# Patient Record
Sex: FEMALE | Race: WHITE | NOT HISPANIC OR LATINO | Employment: FULL TIME | URBAN - METROPOLITAN AREA
[De-identification: names, ages, dates, MRNs, and addresses within clinical notes are randomized per-mention and may not be internally consistent; named-entity substitution may affect disease eponyms.]

---

## 2017-02-09 ENCOUNTER — OFFICE VISIT (OUTPATIENT)
Dept: INTERNAL MEDICINE | Facility: CLINIC | Age: 52
End: 2017-02-09

## 2017-02-09 ENCOUNTER — APPOINTMENT (OUTPATIENT)
Dept: WOMENS IMAGING | Facility: HOSPITAL | Age: 52
End: 2017-02-09

## 2017-02-09 VITALS
HEART RATE: 84 BPM | OXYGEN SATURATION: 97 % | TEMPERATURE: 97.5 F | DIASTOLIC BLOOD PRESSURE: 80 MMHG | BODY MASS INDEX: 32.07 KG/M2 | WEIGHT: 181 LBS | HEIGHT: 63 IN | SYSTOLIC BLOOD PRESSURE: 122 MMHG

## 2017-02-09 DIAGNOSIS — S69.91XA JAMMED FINGER (INTERPHALANGEAL JOINT), RIGHT, INITIAL ENCOUNTER: ICD-10-CM

## 2017-02-09 DIAGNOSIS — M79.644 PAIN OF FINGER OF RIGHT HAND: Primary | ICD-10-CM

## 2017-02-09 PROCEDURE — 99213 OFFICE O/P EST LOW 20 MIN: CPT | Performed by: NURSE PRACTITIONER

## 2017-02-09 PROCEDURE — 73140 X-RAY EXAM OF FINGER(S): CPT | Performed by: RADIOLOGY

## 2017-02-09 PROCEDURE — 73140 X-RAY EXAM OF FINGER(S): CPT | Performed by: NURSE PRACTITIONER

## 2017-02-09 NOTE — PROGRESS NOTES
Subjective   Felipa Stern is a 51 y.o. female who presents due to right forefinger pain.    HPI Comments: She was playing pool yesterday when her hand slipped and she hit the pool table with her right first finger as she was making a shot. She c/o swelling in the joint since then with minimal pain, denies numbness/tingling.    Hand Pain    The incident occurred 12 to 24 hours ago. Injury mechanism: jammed. The pain is present in the right fingers (right first finger). The quality of the pain is described as aching. The pain does not radiate. The pain is at a severity of 2/10. The pain is mild. The pain has been intermittent since the incident. Pertinent negatives include no chest pain, numbness or tingling. The symptoms are aggravated by palpation. She has tried nothing for the symptoms.        The following portions of the patient's history were reviewed and updated as appropriate: allergies, current medications, past social history and problem list.    Past Medical History   Diagnosis Date   • Back pain    • Breast cyst    • H/O mammogram    • Hyperlipidemia    • Tinnitus          Current Outpatient Prescriptions:   •  Multiple Vitamins-Minerals (MULTIVITAMIN GUMMIES ADULT PO), Take  by mouth., Disp: , Rfl:     No Known Allergies    Review of Systems   Constitutional: Negative for chills, fatigue, fever and unexpected weight change.   HENT: Negative for congestion, ear pain, postnasal drip, sinus pressure, sore throat and trouble swallowing.    Eyes: Negative for visual disturbance.   Respiratory: Negative for cough, chest tightness and wheezing.    Cardiovascular: Negative for chest pain, palpitations and leg swelling.   Gastrointestinal: Negative for abdominal pain, blood in stool, nausea and vomiting.   Endocrine: Negative for cold intolerance and heat intolerance.   Genitourinary: Negative for dysuria, frequency and urgency.   Musculoskeletal: Positive for arthralgias. Negative for joint swelling.   Skin:  "Negative for color change.   Allergic/Immunologic: Negative for immunocompromised state.   Neurological: Negative for tingling, syncope, weakness, numbness and headaches.   Hematological: Does not bruise/bleed easily.       Objective   Vitals:    02/09/17 1420   BP: 122/80   BP Location: Left arm   Patient Position: Sitting   Cuff Size: Adult   Pulse: 84   Temp: 97.5 °F (36.4 °C)   TempSrc: Oral   SpO2: 97%   Weight: 181 lb (82.1 kg)   Height: 62.5\" (158.8 cm)     Physical Exam   Constitutional: She is oriented to person, place, and time. She appears well-developed and well-nourished. No distress.   HENT:   Head: Normocephalic and atraumatic.   Right Ear: External ear normal.   Left Ear: External ear normal.   Eyes: Conjunctivae are normal.   Neck: Normal range of motion.   Cardiovascular: Normal rate, regular rhythm, normal heart sounds and intact distal pulses.  Exam reveals no gallop and no friction rub.    No murmur heard.  Pulmonary/Chest: Effort normal and breath sounds normal. No respiratory distress.   Musculoskeletal:        Right hand: She exhibits tenderness and swelling. She exhibits normal range of motion.   There is swelling of the right middle phalange in the first finger with surrounding erythema and ecchymosis but no open lacerations   Lymphadenopathy:     She has no cervical adenopathy.   Neurological: She is alert and oriented to person, place, and time.   Skin: Skin is warm and dry. No rash noted. No erythema.   Psychiatric: She has a normal mood and affect. Her behavior is normal.   Nursing note and vitals reviewed.      Assessment/Plan   Felipa was seen today for hand pain.    Diagnoses and all orders for this visit:    Pain of finger of right hand  Comments:  no acute abnl on xray-will send for review. Finger splint placed on joint today, she will rest area & start Ibuprofen as needed for pain  Orders:  -     XR Finger 2+ View Right    Jammed finger (interphalangeal joint), right, initial " encounter

## 2017-04-24 ENCOUNTER — OFFICE VISIT (OUTPATIENT)
Dept: INTERNAL MEDICINE | Facility: CLINIC | Age: 52
End: 2017-04-24

## 2017-04-24 VITALS
DIASTOLIC BLOOD PRESSURE: 84 MMHG | WEIGHT: 178.8 LBS | SYSTOLIC BLOOD PRESSURE: 132 MMHG | BODY MASS INDEX: 31.68 KG/M2 | HEIGHT: 63 IN

## 2017-04-24 DIAGNOSIS — E78.49 OTHER HYPERLIPIDEMIA: ICD-10-CM

## 2017-04-24 DIAGNOSIS — M72.2 PLANTAR FASCIITIS, BILATERAL: ICD-10-CM

## 2017-04-24 DIAGNOSIS — Z00.00 ANNUAL PHYSICAL EXAM: Primary | ICD-10-CM

## 2017-04-24 DIAGNOSIS — J30.2 SEASONAL ALLERGIC RHINITIS, UNSPECIFIED ALLERGIC RHINITIS TRIGGER: ICD-10-CM

## 2017-04-24 DIAGNOSIS — M79.604 LEG PAIN, ANTERIOR, RIGHT: ICD-10-CM

## 2017-04-24 DIAGNOSIS — H93.13 TINNITUS, BILATERAL: ICD-10-CM

## 2017-04-24 DIAGNOSIS — Z12.4 CERVICAL CANCER SCREENING: ICD-10-CM

## 2017-04-24 DIAGNOSIS — Z12.11 SCREEN FOR COLON CANCER: ICD-10-CM

## 2017-04-24 DIAGNOSIS — R73.9 ELEVATED BLOOD SUGAR: ICD-10-CM

## 2017-04-24 PROBLEM — J30.9 ALLERGIC RHINITIS: Status: ACTIVE | Noted: 2017-04-24

## 2017-04-24 PROBLEM — J30.9 ALLERGIC RHINITIS: Status: RESOLVED | Noted: 2017-04-24 | Resolved: 2017-04-24

## 2017-04-24 PROBLEM — M79.606 LEG PAIN, ANTERIOR: Status: ACTIVE | Noted: 2017-04-24

## 2017-04-24 PROBLEM — H93.19 TINNITUS: Status: ACTIVE | Noted: 2017-04-24

## 2017-04-24 LAB
25(OH)D3 SERPL-MCNC: 33.6 NG/ML (ref 30–100)
ALBUMIN SERPL-MCNC: 3.87 G/DL (ref 3.4–4.6)
ALBUMIN/GLOB SERPL: 0.9 G/DL
ALP SERPL-CCNC: 112 U/L (ref 46–116)
ALT SERPL W P-5'-P-CCNC: 29 U/L (ref 14–59)
ANION GAP SERPL CALCULATED.3IONS-SCNC: 8 MMOL/L
AST SERPL-CCNC: 22 U/L (ref 7–37)
BACTERIA UR QL AUTO: ABNORMAL /HPF
BASOPHILS # BLD AUTO: 0.02 10*3/MM3 (ref 0–0.2)
BASOPHILS NFR BLD AUTO: 0.3 % (ref 0–2)
BILIRUB SERPL-MCNC: 0.5 MG/DL (ref 0.2–1)
BILIRUB UR QL STRIP: NEGATIVE
BUN BLD-MCNC: 19 MG/DL (ref 6–22)
BUN/CREAT SERPL: 22.9 (ref 7–25)
CALCIUM SPEC-SCNC: 10 MG/DL (ref 8.6–10.5)
CHLORIDE SERPL-SCNC: 103 MMOL/L (ref 95–107)
CHOLEST SERPL-MCNC: 277 MG/DL (ref 0–200)
CLARITY UR: ABNORMAL
CO2 SERPL-SCNC: 29 MMOL/L (ref 23–32)
COLOR UR: YELLOW
CREAT BLD-MCNC: 0.83 MG/DL (ref 0.55–1.02)
DEPRECATED RDW RBC AUTO: 42.7 FL (ref 37–54)
EOSINOPHIL # BLD AUTO: 0.24 10*3/MM3 (ref 0–0.7)
EOSINOPHIL NFR BLD AUTO: 3.4 % (ref 0–5)
ERYTHROCYTE [DISTWIDTH] IN BLOOD BY AUTOMATED COUNT: 13 % (ref 11.5–15)
GFR SERPL CREATININE-BSD FRML MDRD: 73 ML/MIN/1.73
GLOBULIN UR ELPH-MCNC: 4.1 GM/DL
GLUCOSE BLD-MCNC: 109 MG/DL (ref 70–100)
GLUCOSE UR STRIP-MCNC: NEGATIVE MG/DL
HCT VFR BLD AUTO: 45.3 % (ref 34.1–44.9)
HDLC SERPL-MCNC: 68 MG/DL (ref 40–81)
HEMOCCULT STL QL IA: NEGATIVE
HGB BLD-MCNC: 15.2 G/DL (ref 11.2–15.7)
HGB UR QL STRIP.AUTO: ABNORMAL
HYALINE CASTS UR QL AUTO: ABNORMAL /LPF
KETONES UR QL STRIP: NEGATIVE
LDLC SERPL CALC-MCNC: 188 MG/DL (ref 0–100)
LDLC/HDLC SERPL: 2.76 {RATIO}
LEUKOCYTE ESTERASE UR QL STRIP.AUTO: NEGATIVE
LYMPHOCYTES # BLD AUTO: 2.36 10*3/MM3 (ref 0.8–7)
LYMPHOCYTES NFR BLD AUTO: 33.1 % (ref 10–60)
MCH RBC QN AUTO: 30.6 PG (ref 26–34)
MCHC RBC AUTO-ENTMCNC: 33.6 G/DL (ref 31–37)
MCV RBC AUTO: 91.3 FL (ref 80–100)
MONOCYTES # BLD AUTO: 0.53 10*3/MM3 (ref 0–1)
MONOCYTES NFR BLD AUTO: 7.4 % (ref 0–13)
MUCOUS THREADS URNS QL MICRO: ABNORMAL /HPF
NEUTROPHILS # BLD AUTO: 3.98 10*3/MM3 (ref 1–11)
NEUTROPHILS NFR BLD AUTO: 55.8 % (ref 30–85)
NITRITE UR QL STRIP: NEGATIVE
PH UR STRIP.AUTO: 6 [PH] (ref 5–8)
PLATELET # BLD AUTO: 328 10*3/MM3 (ref 150–450)
PMV BLD AUTO: 9.7 FL (ref 6–12)
POTASSIUM BLD-SCNC: 5 MMOL/L (ref 3.3–5.3)
PROT SERPL-MCNC: 8 G/DL (ref 6.3–8.4)
PROT UR QL STRIP: NEGATIVE
RBC # BLD AUTO: 4.96 10*6/MM3 (ref 3.93–5.22)
RBC # UR: ABNORMAL /HPF
REF LAB TEST METHOD: ABNORMAL
SODIUM BLD-SCNC: 140 MMOL/L (ref 136–145)
SP GR UR STRIP: 1.02 (ref 1–1.03)
SQUAMOUS #/AREA URNS HPF: ABNORMAL /HPF
TRIGL SERPL-MCNC: 106 MG/DL (ref 0–150)
TSH SERPL DL<=0.05 MIU/L-ACNC: 1.2 MIU/ML (ref 0.4–4.2)
UROBILINOGEN UR QL STRIP: ABNORMAL
VLDLC SERPL-MCNC: 21.2 MG/DL
WBC NRBC COR # BLD: 7.13 10*3/MM3 (ref 5–10)
WBC UR QL AUTO: ABNORMAL /HPF

## 2017-04-24 PROCEDURE — 80061 LIPID PANEL: CPT | Performed by: INTERNAL MEDICINE

## 2017-04-24 PROCEDURE — 85025 COMPLETE CBC W/AUTO DIFF WBC: CPT | Performed by: INTERNAL MEDICINE

## 2017-04-24 PROCEDURE — 81001 URINALYSIS AUTO W/SCOPE: CPT | Performed by: INTERNAL MEDICINE

## 2017-04-24 PROCEDURE — 99396 PREV VISIT EST AGE 40-64: CPT | Performed by: INTERNAL MEDICINE

## 2017-04-24 PROCEDURE — 80053 COMPREHEN METABOLIC PANEL: CPT | Performed by: INTERNAL MEDICINE

## 2017-04-24 PROCEDURE — 82274 ASSAY TEST FOR BLOOD FECAL: CPT | Performed by: INTERNAL MEDICINE

## 2017-04-24 PROCEDURE — 84443 ASSAY THYROID STIM HORMONE: CPT | Performed by: INTERNAL MEDICINE

## 2017-04-24 NOTE — PROGRESS NOTES
"Subjective   Felipa Stern is a 51 y.o. female here for   Chief Complaint   Patient presents with   • Annual Exam     w/ pap   .    Vitals:    04/24/17 0947   BP: 132/84   BP Location: Left arm   Patient Position: Sitting   Cuff Size: Adult   Weight: 178 lb 12.8 oz (81.1 kg)   Height: 63.25\" (160.7 cm)       Hyperlipidemia   This is a chronic problem. The current episode started more than 1 year ago. The problem is uncontrolled. Recent lipid tests were reviewed and are high. She has no history of chronic renal disease, hypothyroidism or liver disease. Pertinent negatives include no chest pain, myalgias or shortness of breath.        Encounter Diagnoses   Name Primary?   • Annual physical exam Yes   • Other hyperlipidemia    • Elevated blood sugar    • Seasonal allergic rhinitis, unspecified allergic rhinitis trigger    • Tinnitus, bilateral    • Plantar fasciitis, bilateral    • Cervical cancer screening    • Screen for colon cancer    • Leg pain, anterior, right        The following portions of the patient's history were reviewed and updated as appropriate: allergies, current medications, past social history and problem list.    Review of Systems   Constitutional: Negative for appetite change, chills, fatigue, fever and unexpected weight change.   HENT: Positive for congestion, postnasal drip and tinnitus. Negative for ear pain, mouth sores, sinus pressure, sore throat, trouble swallowing and voice change.    Eyes: Negative for pain and visual disturbance.   Respiratory: Negative for cough, choking, shortness of breath and wheezing.    Cardiovascular: Negative for chest pain, palpitations and leg swelling.   Gastrointestinal: Negative for abdominal pain, blood in stool, constipation, diarrhea, nausea and vomiting.   Endocrine: Negative for cold intolerance, heat intolerance, polydipsia and polyuria.   Genitourinary: Negative for difficulty urinating, dysuria, enuresis, flank pain, frequency, hematuria, pelvic pain, " urgency and vaginal bleeding.   Musculoskeletal: Positive for arthralgias (bilateral foot pain - getting better). Negative for back pain, gait problem, joint swelling, myalgias, neck pain and neck stiffness.   Skin: Negative for color change and rash.   Allergic/Immunologic: Positive for environmental allergies. Negative for food allergies and immunocompromised state.   Neurological: Negative for dizziness, tremors, seizures, syncope, speech difficulty, weakness, numbness and headaches.   Hematological: Negative for adenopathy. Does not bruise/bleed easily.   Psychiatric/Behavioral: Negative for agitation, confusion, decreased concentration, dysphoric mood, sleep disturbance and suicidal ideas. The patient is not nervous/anxious.      Pain in right lateral shin (where there is a varicose vein).    Objective   Physical Exam   Constitutional: She appears well-developed and well-nourished.   HENT:   Right Ear: Hearing, tympanic membrane, external ear and ear canal normal.   Left Ear: Hearing, tympanic membrane, external ear and ear canal normal.   Nose: Right sinus exhibits no maxillary sinus tenderness and no frontal sinus tenderness. Left sinus exhibits no maxillary sinus tenderness and no frontal sinus tenderness.   Eyes: Conjunctivae, EOM and lids are normal. Pupils are equal, round, and reactive to light.   Neck: Trachea normal. Neck supple. No JVD present. Carotid bruit is not present. No tracheal deviation present. No thyroid mass and no thyromegaly present.   Cardiovascular: Normal rate, regular rhythm, S1 normal and S2 normal.  Exam reveals no gallop and no friction rub.    No murmur heard.  Pulses:       Carotid pulses are 2+ on the right side, and 2+ on the left side.       Radial pulses are 2+ on the right side, and 2+ on the left side.        Dorsalis pedis pulses are 2+ on the right side, and 2+ on the left side.        Posterior tibial pulses are 2+ on the right side, and 2+ on the left side.    Pulmonary/Chest: Effort normal and breath sounds normal. Chest wall is not dull to percussion. Right breast exhibits no inverted nipple, no mass, no nipple discharge, no skin change and no tenderness. Left breast exhibits no inverted nipple, no mass, no nipple discharge, no skin change and no tenderness.   Abdominal: Soft. Normal aorta and bowel sounds are normal. She exhibits no abdominal bruit. There is no hepatosplenomegaly. There is no tenderness. There is no rebound and no guarding. No hernia.   Genitourinary: Rectum normal, vagina normal and uterus normal. Rectal exam shows guaiac negative stool. No labial fusion. There is no rash, tenderness, lesion or injury on the right labia. There is no rash, tenderness, lesion or injury on the left labia. Cervix exhibits no discharge. Right adnexum displays no mass, no tenderness and no fullness. Left adnexum displays no mass, no tenderness and no fullness.   Musculoskeletal: Normal range of motion. She exhibits no edema.   Lymphadenopathy:     She has no cervical adenopathy.     She has no axillary adenopathy.        Right: No supraclavicular adenopathy present.        Left: No supraclavicular adenopathy present.   Neurological: She is alert. She has normal strength. No cranial nerve deficit or sensory deficit. She displays a negative Romberg sign.   Reflex Scores:       Patellar reflexes are 2+ on the right side and 2+ on the left side.  Skin: Skin is warm and dry.   Nursing note and vitals reviewed.    Varicose vein right lateral shin.    Assessment/Plan   Problem List Items Addressed This Visit        Unprioritized    Hyperlipidemia    Elevated blood sugar    Allergic rhinitis    Tinnitus    Plantar fasciitis, bilateral    Leg pain, anterior    Relevant Orders    Ambulatory Referral to Vascular Surgery      Other Visit Diagnoses     Annual physical exam    -  Primary    Relevant Orders    CBC Auto Differential    Comprehensive Metabolic Panel    Lipid Panel    POC  FECAL OCCULT BLOOD BY IMMUNOASSAY (Completed)    TSH    Urinalysis With / Microscopic If Indicated    Pap IG, HPV-hr    Vitamin D 25 Hydroxy    Cervical cancer screening        Relevant Orders    Pap IG, HPV-hr    Screen for colon cancer        Relevant Orders    POC FECAL OCCULT BLOOD BY IMMUNOASSAY (Completed)           Normal CPE - pap pending.   High chol - lab pending.   Jordin fasciitis - getting better with ice & stretches - call if incr sx.

## 2017-04-25 NOTE — PROGRESS NOTES
High cholesterol/fatsugar - need low fat/sugar diet & more exercise.  Need to start chol med!  Low vit d (borderline) - need to add vit D 1,000-2,000 units/day.  Small amount of blood in urine - need recheck 1-3 mos.

## 2017-04-28 LAB
CHROM ANALY OVERALL INTERP-IMP: NORMAL
CONV .: NORMAL
CONV PERFORMED BY:: NORMAL
DX ICD CODE: NORMAL
HIV 1 & 2 AB SER-IMP: NORMAL
HPV I/H RISK 1 DNA CVX QL PROBE+SIG AMP: NEGATIVE
REF LAB TEST METHOD: NORMAL
STAT OF ADQ CVX/VAG CYTO-IMP: NORMAL

## 2017-08-15 ENCOUNTER — OFFICE VISIT (OUTPATIENT)
Dept: INTERNAL MEDICINE | Facility: CLINIC | Age: 52
End: 2017-08-15

## 2017-08-15 ENCOUNTER — TRANSCRIBE ORDERS (OUTPATIENT)
Dept: ADMINISTRATIVE | Facility: HOSPITAL | Age: 52
End: 2017-08-15

## 2017-08-15 VITALS
BODY MASS INDEX: 32.43 KG/M2 | SYSTOLIC BLOOD PRESSURE: 124 MMHG | TEMPERATURE: 98.4 F | HEIGHT: 63 IN | HEART RATE: 93 BPM | WEIGHT: 183 LBS | OXYGEN SATURATION: 98 % | DIASTOLIC BLOOD PRESSURE: 84 MMHG

## 2017-08-15 DIAGNOSIS — M54.10 RADICULAR SYNDROME OF RIGHT LEG: Primary | ICD-10-CM

## 2017-08-15 DIAGNOSIS — S90.121A CONTUSION OF LESSER TOE OF RIGHT FOOT WITHOUT DAMAGE TO NAIL, INITIAL ENCOUNTER: ICD-10-CM

## 2017-08-15 DIAGNOSIS — M79.604 LEG PAIN, ANTERIOR, RIGHT: ICD-10-CM

## 2017-08-15 DIAGNOSIS — J06.9 ACUTE URI: Primary | ICD-10-CM

## 2017-08-15 PROCEDURE — 99214 OFFICE O/P EST MOD 30 MIN: CPT | Performed by: NURSE PRACTITIONER

## 2017-08-15 RX ORDER — CEFDINIR 300 MG/1
300 CAPSULE ORAL 2 TIMES DAILY
Qty: 14 CAPSULE | Refills: 0 | Status: SHIPPED | OUTPATIENT
Start: 2017-08-15 | End: 2017-08-22

## 2017-08-15 RX ORDER — GUAIFENESIN 600 MG/1
1200 TABLET, EXTENDED RELEASE ORAL 2 TIMES DAILY
Qty: 14 TABLET
Start: 2017-08-15 | End: 2017-08-22

## 2017-08-15 RX ORDER — FLUTICASONE PROPIONATE 50 MCG
2 SPRAY, SUSPENSION (ML) NASAL DAILY
Qty: 1 BOTTLE | Refills: 3
Start: 2017-08-15 | End: 2018-02-23

## 2017-08-15 RX ORDER — LORATADINE 10 MG/1
10 TABLET ORAL DAILY
Qty: 10 TABLET
Start: 2017-08-15 | End: 2017-08-25

## 2017-08-15 NOTE — PROGRESS NOTES
Subjective   Felipa Stern is a 51 y.o. female who presents due to respiratory symptoms.    HPI Comments: She also c/o bruising of her 2nd toe, right foot with no acute injury or trauma. However, sx began after she was helping someone move. She has also started walking more over the past few months.  She saw Dr. Aguilar earlier today due to numbness/tingling in right lower leg and will be referred for a MRI of her lumbar spine to further evaluate.    URI    This is a new problem. The current episode started 1 to 4 weeks ago (sx began sangeetha 2 weeks ago). The problem has been gradually worsening. There has been no fever. Associated symptoms include congestion, headaches, rhinorrhea, sinus pain and a sore throat. Pertinent negatives include no abdominal pain, chest pain, coughing, diarrhea, ear pain, nausea, neck pain, sneezing, vomiting or wheezing.        The following portions of the patient's history were reviewed and updated as appropriate: allergies, current medications, past social history and problem list.    Past Medical History:   Diagnosis Date   • Allergic 1990    Mostly mild, intermittent.   • Allergic rhinitis    • Back pain    • Breast cyst    • Elevated blood sugar    • H/O mammogram    • Hyperlipidemia    • Tinnitus    • Visual impairment 1998?    Ocular pressure runs higer than normal.         Current Outpatient Prescriptions:   •  Multiple Vitamins-Minerals (MULTIVITAMIN GUMMIES ADULT PO), Take 1 tablet by mouth Daily., Disp: , Rfl:   •  cefdinir (OMNICEF) 300 MG capsule, Take 1 capsule by mouth 2 (Two) Times a Day for 7 days., Disp: 14 capsule, Rfl: 0  •  fluticasone (FLONASE) 50 MCG/ACT nasal spray, 2 sprays into each nostril Daily., Disp: 1 bottle, Rfl: 3  •  guaiFENesin (MUCINEX) 600 MG 12 hr tablet, Take 2 tablets by mouth 2 (Two) Times a Day for 7 days., Disp: 14 tablet, Rfl:   •  loratadine (CLARITIN) 10 MG tablet, Take 1 tablet by mouth Daily for 10 days., Disp: 10 tablet, Rfl:     No Known  "Allergies    Review of Systems   Constitutional: Negative for appetite change, chills, fatigue and fever.   HENT: Positive for congestion, rhinorrhea, sinus pressure and sore throat. Negative for ear discharge, ear pain, facial swelling, hearing loss, postnasal drip, sneezing and tinnitus.    Respiratory: Negative for cough, chest tightness, shortness of breath and wheezing.    Cardiovascular: Negative for chest pain, palpitations and leg swelling.   Gastrointestinal: Negative for abdominal pain, diarrhea, nausea and vomiting.   Musculoskeletal: Negative for neck pain and neck stiffness.   Skin: Positive for color change.   Neurological: Positive for numbness and headaches.   Hematological: Negative for adenopathy.       Objective   Vitals:    08/15/17 1434   BP: 124/84   BP Location: Left arm   Patient Position: Sitting   Cuff Size: Adult   Pulse: 93   Temp: 98.4 °F (36.9 °C)   TempSrc: Oral   SpO2: 98%   Weight: 183 lb (83 kg)   Height: 63.25\" (160.7 cm)     Physical Exam   Constitutional: She appears well-developed and well-nourished. She is cooperative. She does not have a sickly appearance. She does not appear ill.   HENT:   Head: Normocephalic.   Right Ear: Hearing and external ear normal. No drainage, swelling or tenderness. Tympanic membrane is bulging. Tympanic membrane is not erythematous. No middle ear effusion. No decreased hearing is noted.   Left Ear: Hearing and external ear normal. No drainage, swelling or tenderness. Tympanic membrane is bulging. Tympanic membrane is not erythematous.  No middle ear effusion. No decreased hearing is noted.   Nose: No mucosal edema, rhinorrhea, sinus tenderness or nasal deformity. Right sinus exhibits frontal sinus tenderness. Right sinus exhibits no maxillary sinus tenderness. Left sinus exhibits frontal sinus tenderness. Left sinus exhibits no maxillary sinus tenderness.   Mouth/Throat: Mucous membranes are normal. Posterior oropharyngeal erythema present.   Eyes: " Conjunctivae and lids are normal.   Neck: Trachea normal.   Cardiovascular: Regular rhythm, normal heart sounds and normal pulses.    No murmur heard.  Pulmonary/Chest: Breath sounds normal. No respiratory distress. She has no decreased breath sounds. She has no wheezes. She has no rhonchi. She has no rales.   Lymphadenopathy:     She has no cervical adenopathy.   Neurological: She is alert.   Skin: Skin is warm, dry and intact.   +bruising of 2nd toe, right foot   Nursing note and vitals reviewed.      Assessment/Plan   Felipa was seen today for sinus problem.    Diagnoses and all orders for this visit:    Acute URI  -     guaiFENesin (MUCINEX) 600 MG 12 hr tablet; Take 2 tablets by mouth 2 (Two) Times a Day for 7 days.  -     loratadine (CLARITIN) 10 MG tablet; Take 1 tablet by mouth Daily for 10 days.  -     fluticasone (FLONASE) 50 MCG/ACT nasal spray; 2 sprays into each nostril Daily.  -     cefdinir (OMNICEF) 300 MG capsule; Take 1 capsule by mouth 2 (Two) Times a Day for 7 days.    Leg pain, anterior, right  Comments:  scheduled for MRI by Dr. Aguilar    Contusion of lesser toe of right foot without damage to nail, initial encounter  Comments:  most likely due to increased walking/irritation from shoe, continue to monitor

## 2017-08-21 ENCOUNTER — APPOINTMENT (OUTPATIENT)
Dept: MRI IMAGING | Facility: HOSPITAL | Age: 52
End: 2017-08-21
Attending: SURGERY

## 2017-08-22 ENCOUNTER — HOSPITAL ENCOUNTER (OUTPATIENT)
Dept: MRI IMAGING | Facility: HOSPITAL | Age: 52
Discharge: HOME | End: 2017-08-22
Attending: SURGERY | Admitting: SURGERY

## 2017-08-22 DIAGNOSIS — M54.10 RADICULAR SYNDROME OF RIGHT LEG: ICD-10-CM

## 2017-08-22 PROCEDURE — 72148 MRI LUMBAR SPINE W/O DYE: CPT

## 2017-10-03 ENCOUNTER — OFFICE VISIT (OUTPATIENT)
Dept: INTERNAL MEDICINE | Facility: CLINIC | Age: 52
End: 2017-10-03

## 2017-10-03 VITALS
SYSTOLIC BLOOD PRESSURE: 132 MMHG | DIASTOLIC BLOOD PRESSURE: 80 MMHG | BODY MASS INDEX: 32.36 KG/M2 | HEIGHT: 63 IN | WEIGHT: 182.6 LBS

## 2017-10-03 DIAGNOSIS — Z82.49 FAMILY HISTORY OF HEART DISEASE: ICD-10-CM

## 2017-10-03 DIAGNOSIS — R31.21 ASYMPTOMATIC MICROSCOPIC HEMATURIA: ICD-10-CM

## 2017-10-03 DIAGNOSIS — R20.2 PARESTHESIA: ICD-10-CM

## 2017-10-03 DIAGNOSIS — R73.9 ELEVATED BLOOD SUGAR: ICD-10-CM

## 2017-10-03 DIAGNOSIS — E78.49 OTHER HYPERLIPIDEMIA: Primary | ICD-10-CM

## 2017-10-03 PROCEDURE — 99213 OFFICE O/P EST LOW 20 MIN: CPT | Performed by: INTERNAL MEDICINE

## 2017-10-03 NOTE — PROGRESS NOTES
"Subjective   Felipa Stern is a 51 y.o. female here for   Chief Complaint   Patient presents with   • Hyperlipidemia     3 month follow-up   .    Vitals:    10/03/17 1604   BP: 132/80   BP Location: Left arm   Patient Position: Sitting   Cuff Size: Adult   Weight: 182 lb 9.6 oz (82.8 kg)   Height: 63.25\" (160.7 cm)       Body mass index is 32.09 kg/(m^2).    Hyperlipidemia   This is a chronic problem. The current episode started more than 1 year ago. The problem is controlled. Recent lipid tests were reviewed and are normal. She has no history of diabetes. Pertinent negatives include no chest pain or shortness of breath.        The following portions of the patient's history were reviewed and updated as appropriate: allergies, current medications, past social history and problem list.    Review of Systems   Constitutional: Negative for chills, fatigue and fever.   Respiratory: Negative for cough, shortness of breath and wheezing.    Cardiovascular: Negative for chest pain, palpitations and leg swelling.   Psychiatric/Behavioral: Negative for dysphoric mood and sleep disturbance. The patient is not nervous/anxious.      MRI of lumbar spine shows mild increase in arthritic changes.    Objective   Physical Exam   Constitutional: She appears well-developed and well-nourished. No distress.   Cardiovascular: Normal rate, regular rhythm and normal heart sounds.    Pulmonary/Chest: No respiratory distress. She has no wheezes. She has no rales. She exhibits no tenderness.   Musculoskeletal: She exhibits no edema.   Psychiatric: She has a normal mood and affect. Her behavior is normal.   Nursing note and vitals reviewed.      Assessment/Plan   Diagnoses and all orders for this visit:    Other hyperlipidemia  Comments:  need diet & exercise - need rechk labs 4-6 wks - may need statin  Orders:  -     Comprehensive Metabolic Panel; Future  -     Lipid Panel; Future    Elevated blood sugar  Comments:  need sugar chk  Orders:  -   "   Hemoglobin A1c; Future    Asymptomatic microscopic hematuria  Comments:  need rechk  Orders:  -     Urinalysis With / Microscopic If Indicated - Urine, Clean Catch; Future    Family history of heart disease  Comments:  CABG age 70    Paresthesia  Comments:  left mid back numbness for 15 yrs - also small spot of lateral right shin numb - no change in either location- will see neurosurgeon if any worsening         She declines referral to neurosurgeon & PT for now - call if any weakness or pain.

## 2017-11-17 ENCOUNTER — LAB (OUTPATIENT)
Dept: INTERNAL MEDICINE | Facility: CLINIC | Age: 52
End: 2017-11-17

## 2017-11-17 DIAGNOSIS — E78.49 OTHER HYPERLIPIDEMIA: ICD-10-CM

## 2017-11-17 DIAGNOSIS — R31.21 ASYMPTOMATIC MICROSCOPIC HEMATURIA: ICD-10-CM

## 2017-11-17 DIAGNOSIS — R73.9 ELEVATED BLOOD SUGAR: ICD-10-CM

## 2017-11-17 LAB
ALBUMIN SERPL-MCNC: 4.2 G/DL (ref 3.5–5.2)
ALBUMIN/GLOB SERPL: 1.3 G/DL
ALP SERPL-CCNC: 128 U/L (ref 39–117)
ALT SERPL-CCNC: 24 U/L (ref 1–33)
AST SERPL-CCNC: 21 U/L (ref 1–32)
BILIRUB SERPL-MCNC: 0.3 MG/DL (ref 0.1–1.2)
BILIRUB UR QL STRIP: NEGATIVE
BUN SERPL-MCNC: 18 MG/DL (ref 6–20)
BUN/CREAT SERPL: 19.4 (ref 7–25)
CALCIUM SERPL-MCNC: 10 MG/DL (ref 8.6–10.5)
CHLORIDE SERPL-SCNC: 102 MMOL/L (ref 98–107)
CHOLEST SERPL-MCNC: 267 MG/DL (ref 0–200)
CLARITY UR: ABNORMAL
CO2 SERPL-SCNC: 27.1 MMOL/L (ref 22–29)
COLOR UR: YELLOW
CREAT SERPL-MCNC: 0.93 MG/DL (ref 0.57–1)
GLOBULIN SER CALC-MCNC: 3.2 GM/DL
GLUCOSE SERPL-MCNC: 98 MG/DL (ref 65–99)
GLUCOSE UR STRIP-MCNC: NEGATIVE MG/DL
HBA1C MFR BLD: 5.7 % (ref 4.8–5.6)
HDLC SERPL-MCNC: 64 MG/DL (ref 40–60)
HGB UR QL STRIP.AUTO: NEGATIVE
KETONES UR QL STRIP: NEGATIVE
LDLC SERPL CALC-MCNC: 184 MG/DL (ref 0–100)
LEUKOCYTE ESTERASE UR QL STRIP.AUTO: NEGATIVE
NITRITE UR QL STRIP: NEGATIVE
PH UR STRIP.AUTO: 5.5 [PH] (ref 5–8)
POTASSIUM SERPL-SCNC: 4.6 MMOL/L (ref 3.5–5.2)
PROT SERPL-MCNC: 7.4 G/DL (ref 6–8.5)
PROT UR QL STRIP: NEGATIVE
SODIUM SERPL-SCNC: 141 MMOL/L (ref 136–145)
SP GR UR STRIP: 1.02 (ref 1–1.03)
TRIGL SERPL-MCNC: 96 MG/DL (ref 0–150)
UROBILINOGEN UR QL STRIP: ABNORMAL
VLDLC SERPL-MCNC: 19.2 MG/DL (ref 5–40)

## 2017-11-17 PROCEDURE — 83036 HEMOGLOBIN GLYCOSYLATED A1C: CPT | Performed by: INTERNAL MEDICINE

## 2017-11-17 PROCEDURE — 81003 URINALYSIS AUTO W/O SCOPE: CPT | Performed by: INTERNAL MEDICINE

## 2017-11-18 NOTE — PROGRESS NOTES
High sugar/cholesterol/fat - need low fat/sugar diet & more exercise.  Need to discuss chol medication.

## 2018-02-23 ENCOUNTER — OFFICE VISIT (OUTPATIENT)
Dept: INTERNAL MEDICINE | Facility: CLINIC | Age: 53
End: 2018-02-23

## 2018-02-23 VITALS
WEIGHT: 184.2 LBS | TEMPERATURE: 98.6 F | SYSTOLIC BLOOD PRESSURE: 150 MMHG | BODY MASS INDEX: 32.64 KG/M2 | DIASTOLIC BLOOD PRESSURE: 98 MMHG | HEIGHT: 63 IN

## 2018-02-23 DIAGNOSIS — J06.9 UPPER RESPIRATORY TRACT INFECTION, UNSPECIFIED TYPE: Primary | ICD-10-CM

## 2018-02-23 DIAGNOSIS — I10 ESSENTIAL HYPERTENSION: ICD-10-CM

## 2018-02-23 PROCEDURE — 99213 OFFICE O/P EST LOW 20 MIN: CPT | Performed by: INTERNAL MEDICINE

## 2018-02-23 RX ORDER — LOSARTAN POTASSIUM 100 MG/1
100 TABLET ORAL DAILY
Qty: 30 TABLET | Refills: 4 | Status: SHIPPED | OUTPATIENT
Start: 2018-02-23 | End: 2018-09-18 | Stop reason: SDUPTHER

## 2018-02-23 NOTE — PROGRESS NOTES
"Subjective   Felipa Stern is a 52 y.o. female here for   Chief Complaint   Patient presents with   • Generalized Body Aches     x 2 days   • URI     x 2 days   .    Vitals:    02/23/18 1436 02/23/18 1500   BP: 140/98 150/98   BP Location: Left arm    Patient Position: Sitting    Cuff Size: Adult    Temp: 98.6 °F (37 °C)    TempSrc: Temporal Artery     Weight: 83.6 kg (184 lb 3.2 oz)    Height: 160.7 cm (63.25\")        Body mass index is 32.37 kg/(m^2).    URI    This is a new problem. The current episode started in the past 7 days. The problem has been unchanged. There has been no fever. Associated symptoms include congestion, coughing, rhinorrhea, sinus pain, sneezing and wheezing. Pertinent negatives include no chest pain, diarrhea, dysuria, ear pain, nausea, sore throat, swollen glands or vomiting.        The following portions of the patient's history were reviewed and updated as appropriate: allergies, current medications, past social history and problem list.    Review of Systems   Constitutional: Positive for fatigue. Negative for chills and fever.   HENT: Positive for congestion, postnasal drip, rhinorrhea, sinus pain, sneezing and voice change. Negative for ear pain, sore throat and trouble swallowing.    Respiratory: Positive for cough and wheezing. Negative for shortness of breath.    Cardiovascular: Negative for chest pain, palpitations and leg swelling.   Gastrointestinal: Negative for diarrhea, nausea and vomiting.   Genitourinary: Negative for dysuria.       Objective   Physical Exam   Constitutional: She appears well-developed and well-nourished. No distress.   HENT:   Head: Normocephalic.   Right Ear: External ear normal. Tympanic membrane is bulging. Tympanic membrane is not erythematous.   Left Ear: External ear normal. Tympanic membrane is bulging. Tympanic membrane is not erythematous.   Nose: Right sinus exhibits no frontal sinus tenderness. Left sinus exhibits no frontal sinus tenderness. "   Mouth/Throat: Oropharynx is clear and moist. No oropharyngeal exudate.   Neck: Normal range of motion. Neck supple.   Cardiovascular: Normal rate, regular rhythm and normal heart sounds.    Pulmonary/Chest: Effort normal and breath sounds normal. No respiratory distress. She has no wheezes. She has no rales. She exhibits no tenderness.   Musculoskeletal: She exhibits no edema.   Lymphadenopathy:     She has no cervical adenopathy.   Psychiatric: She has a normal mood and affect. Her behavior is normal.   Nursing note and vitals reviewed.      Assessment/Plan   Diagnoses and all orders for this visit:    Upper respiratory tract infection, unspecified type  Comments:  need daily antihistamine, bid mucinex, saline nasal tid - call if any worsening    Essential hypertension  Comments:  borderline chronically - higher today - stop otc decongestants - start losartan (may start with 1/2 pill daily) - call if bp over 140/90  Orders:  -     losartan (COZAAR) 100 MG tablet; Take 1 tablet by mouth Daily.

## 2018-04-26 ENCOUNTER — OFFICE VISIT (OUTPATIENT)
Dept: INTERNAL MEDICINE | Facility: CLINIC | Age: 53
End: 2018-04-26

## 2018-04-26 VITALS
WEIGHT: 177 LBS | DIASTOLIC BLOOD PRESSURE: 86 MMHG | BODY MASS INDEX: 30.22 KG/M2 | OXYGEN SATURATION: 96 % | SYSTOLIC BLOOD PRESSURE: 118 MMHG | HEART RATE: 83 BPM | HEIGHT: 64 IN

## 2018-04-26 DIAGNOSIS — J30.2 SEASONAL ALLERGIC RHINITIS, UNSPECIFIED CHRONICITY, UNSPECIFIED TRIGGER: ICD-10-CM

## 2018-04-26 DIAGNOSIS — Z82.49 FAMILY HISTORY OF HEART DISEASE: ICD-10-CM

## 2018-04-26 DIAGNOSIS — I10 ESSENTIAL HYPERTENSION: ICD-10-CM

## 2018-04-26 DIAGNOSIS — Z00.00 ANNUAL PHYSICAL EXAM: Primary | ICD-10-CM

## 2018-04-26 DIAGNOSIS — R73.9 ELEVATED BLOOD SUGAR: ICD-10-CM

## 2018-04-26 DIAGNOSIS — R22.42 MASS OF LEG, LEFT: ICD-10-CM

## 2018-04-26 DIAGNOSIS — Z12.11 SCREENING FOR COLON CANCER: ICD-10-CM

## 2018-04-26 DIAGNOSIS — E78.49 OTHER HYPERLIPIDEMIA: ICD-10-CM

## 2018-04-26 LAB
ALBUMIN SERPL-MCNC: 4.1 G/DL (ref 3.5–5.2)
ALBUMIN/GLOB SERPL: 1 G/DL
ALP SERPL-CCNC: 104 U/L (ref 39–117)
ALT SERPL W P-5'-P-CCNC: 17 U/L (ref 1–33)
ANION GAP SERPL CALCULATED.3IONS-SCNC: 11.6 MMOL/L
AST SERPL-CCNC: 20 U/L (ref 1–32)
BILIRUB SERPL-MCNC: 0.5 MG/DL (ref 0.1–1.2)
BUN BLD-MCNC: 14 MG/DL (ref 6–20)
BUN/CREAT SERPL: 18.4 (ref 7–25)
CALCIUM SPEC-SCNC: 10.4 MG/DL (ref 8.6–10.5)
CHLORIDE SERPL-SCNC: 102 MMOL/L (ref 98–107)
CO2 SERPL-SCNC: 27.4 MMOL/L (ref 22–29)
CREAT BLD-MCNC: 0.76 MG/DL (ref 0.57–1)
CRP SERPL-MCNC: 1.17 MG/DL (ref 0.01–0.5)
GFR SERPL CREATININE-BSD FRML MDRD: 81 ML/MIN/1.73
GLOBULIN UR ELPH-MCNC: 4.1 GM/DL
GLUCOSE BLD-MCNC: 110 MG/DL (ref 65–99)
POTASSIUM BLD-SCNC: 4.4 MMOL/L (ref 3.5–5.2)
PROT SERPL-MCNC: 8.2 G/DL (ref 6–8.5)
SODIUM BLD-SCNC: 141 MMOL/L (ref 136–145)

## 2018-04-26 PROCEDURE — 36415 COLL VENOUS BLD VENIPUNCTURE: CPT | Performed by: INTERNAL MEDICINE

## 2018-04-26 PROCEDURE — 99396 PREV VISIT EST AGE 40-64: CPT | Performed by: INTERNAL MEDICINE

## 2018-04-26 PROCEDURE — 86141 C-REACTIVE PROTEIN HS: CPT | Performed by: INTERNAL MEDICINE

## 2018-04-26 PROCEDURE — 80053 COMPREHEN METABOLIC PANEL: CPT | Performed by: INTERNAL MEDICINE

## 2018-04-26 NOTE — PROGRESS NOTES
"Subjective   Felipa Stern is a 52 y.o. female here for   Chief Complaint   Patient presents with   • Annual Exam   • Hyperlipidemia   • Hypertension   .    Vitals:    04/26/18 0803   BP: 118/86   BP Location: Right arm   Patient Position: Sitting   Cuff Size: Adult   Pulse: 83   SpO2: 96%   Weight: 80.3 kg (177 lb)   Height: 161.9 cm (63.75\")       Body mass index is 30.62 kg/m².    Hypertension   This is a chronic problem. The current episode started more than 1 year ago. The problem has been gradually improving since onset. Pertinent negatives include no chest pain, headaches, neck pain, palpitations or shortness of breath.   Hyperlipidemia   This is a chronic problem. The current episode started more than 1 year ago. The problem is uncontrolled. Recent lipid tests were reviewed and are high. She has no history of diabetes. Pertinent negatives include no chest pain, myalgias or shortness of breath.        The following portions of the patient's history were reviewed and updated as appropriate: allergies, current medications, past social history and problem list.    Review of Systems   Constitutional: Negative for appetite change, chills, fatigue, fever and unexpected weight change.   HENT: Positive for postnasal drip. Negative for congestion, ear pain, mouth sores, sore throat, tinnitus, trouble swallowing and voice change.    Eyes: Negative for pain and visual disturbance.   Respiratory: Negative for cough, choking, shortness of breath and wheezing.    Cardiovascular: Negative for chest pain, palpitations and leg swelling.   Gastrointestinal: Negative for abdominal pain, blood in stool, constipation, diarrhea, nausea and vomiting.   Endocrine: Negative for cold intolerance, heat intolerance, polydipsia and polyuria.   Genitourinary: Negative for difficulty urinating, dysuria, flank pain, frequency, hematuria, urgency and vaginal bleeding.   Musculoskeletal: Negative for arthralgias, back pain, gait problem, " joint swelling, myalgias, neck pain and neck stiffness.   Skin: Negative for color change and rash.   Allergic/Immunologic: Positive for environmental allergies. Negative for food allergies and immunocompromised state.   Neurological: Negative for dizziness, tremors, seizures, syncope, speech difficulty, weakness, numbness and headaches.   Hematological: Negative for adenopathy. Does not bruise/bleed easily.   Psychiatric/Behavioral: Negative for agitation, confusion, decreased concentration, dysphoric mood, sleep disturbance and suicidal ideas. The patient is not nervous/anxious.    mass of left lower thigh for 3 mos - no pain    Objective   Physical Exam   Constitutional: She appears well-developed and well-nourished.   HENT:   Right Ear: Hearing, tympanic membrane, external ear and ear canal normal.   Left Ear: Hearing, tympanic membrane, external ear and ear canal normal.   Nose: Right sinus exhibits no maxillary sinus tenderness and no frontal sinus tenderness. Left sinus exhibits no maxillary sinus tenderness and no frontal sinus tenderness.   Eyes: Conjunctivae, EOM and lids are normal. Pupils are equal, round, and reactive to light.   Neck: Trachea normal. Neck supple. No JVD present. Carotid bruit is not present. No tracheal deviation present. No thyroid mass and no thyromegaly present.   Cardiovascular: Normal rate, regular rhythm, S1 normal and S2 normal.  Exam reveals no gallop and no friction rub.    No murmur heard.  Pulses:       Carotid pulses are 2+ on the right side, and 2+ on the left side.       Radial pulses are 2+ on the right side, and 2+ on the left side.        Dorsalis pedis pulses are 2+ on the right side, and 2+ on the left side.        Posterior tibial pulses are 2+ on the right side, and 2+ on the left side.   Pulmonary/Chest: Effort normal and breath sounds normal. Chest wall is not dull to percussion. Right breast exhibits no inverted nipple, no mass, no nipple discharge, no skin  "change and no tenderness. Left breast exhibits no inverted nipple, no mass, no nipple discharge, no skin change and no tenderness.   Abdominal: Soft. Normal aorta and bowel sounds are normal. She exhibits no abdominal bruit. There is no hepatosplenomegaly. There is no tenderness. There is no rebound and no guarding. No hernia.   Musculoskeletal: Normal range of motion. She exhibits no edema.   Lymphadenopathy:     She has no cervical adenopathy.     She has no axillary adenopathy.        Right: No supraclavicular adenopathy present.        Left: No supraclavicular adenopathy present.   Neurological: She is alert. She has normal strength. No cranial nerve deficit or sensory deficit. She displays a negative Romberg sign.   Reflex Scores:       Patellar reflexes are 2+ on the right side and 2+ on the left side.  Skin: Skin is warm and dry. No erythema.   Nursing note and vitals reviewed.    +4X2\" soft mass of right lateral lower thigh    Assessment/Plan   Diagnoses and all orders for this visit:    Annual physical exam  -     Comprehensive Metabolic Panel; Future  -     Comprehensive Metabolic Panel    Screening for colon cancer  -     Ambulatory Referral to General Surgery    Other hyperlipidemia  Comments:  likely genetic - rechk today - consider chol med  Orders:  -     High Sensitivity CRP; Future  -     Homocysteine; Future  -     Lipoprotein NMR; Future  -     Comprehensive Metabolic Panel; Future  -     High Sensitivity CRP  -     Homocysteine  -     Lipoprotein NMR  -     Comprehensive Metabolic Panel    Essential hypertension  Comments:  bp lower b/c wt loss - ok to decrease losartan to 50mg/d - continue diet/ex - call if bp over 140/90  Orders:  -     High Sensitivity CRP; Future  -     Homocysteine; Future  -     Lipoprotein NMR; Future  -     Comprehensive Metabolic Panel; Future  -     High Sensitivity CRP  -     Homocysteine  -     Lipoprotein NMR  -     Comprehensive Metabolic Panel    Seasonal allergic " rhinitis, unspecified chronicity, unspecified trigger  Comments:  off & on sinus pain - need mucinex bid, daily antihis, if needed-flonase/astepro    Family history of heart disease  -     High Sensitivity CRP; Future  -     Homocysteine; Future  -     Lipoprotein NMR; Future  -     High Sensitivity CRP  -     Homocysteine  -     Lipoprotein NMR    Elevated blood sugar  Comments:  continue low sugar diet & daily exercise    Mass of leg, left  Comments:  soft, movable, non-tender - lipoma? - discuss with surgeon     Refer to surgeon for eval of leg mass & for c-scope.

## 2018-04-27 LAB — HCYS SERPL-SCNC: 7.5 UMOL/L (ref 0–15)

## 2018-04-29 LAB
CHOLEST SERPL-MCNC: 266 MG/DL (ref 100–199)
HDL SERPL-SCNC: 28.9 UMOL/L
HDLC SERPL-MCNC: 53 MG/DL
LDL-P: 2328 NMOL/L
LDLC REAL SIZE PAT SERPL: 21.1 NM
LDLC SERPL CALC-MCNC: 190 MG/DL (ref 0–99)
LP-IR SCORE**: 37
SMALL LDL-P: 884 NMOL/L
TRIGL SERPL-MCNC: 115 MG/DL (ref 0–149)

## 2018-05-08 DIAGNOSIS — E78.49 OTHER HYPERLIPIDEMIA: Primary | ICD-10-CM

## 2018-05-08 DIAGNOSIS — I10 ESSENTIAL HYPERTENSION: ICD-10-CM

## 2018-05-08 DIAGNOSIS — R73.9 ELEVATED BLOOD SUGAR: ICD-10-CM

## 2018-05-08 RX ORDER — PRAVASTATIN SODIUM 10 MG
20 TABLET ORAL DAILY
Qty: 30 TABLET | Refills: 6 | Status: SHIPPED | OUTPATIENT
Start: 2018-05-08 | End: 2018-07-02 | Stop reason: HOSPADM

## 2018-07-02 ENCOUNTER — HOSPITAL ENCOUNTER (OUTPATIENT)
Facility: HOSPITAL | Age: 53
Setting detail: HOSPITAL OUTPATIENT SURGERY
Discharge: HOME | End: 2018-07-02
Attending: SURGERY | Admitting: SURGERY

## 2018-07-02 ENCOUNTER — ANESTHESIA EVENT (OUTPATIENT)
Dept: GASTROENTEROLOGY | Facility: HOSPITAL | Age: 53
End: 2018-07-02

## 2018-07-02 ENCOUNTER — ANESTHESIA (OUTPATIENT)
Dept: GASTROENTEROLOGY | Facility: HOSPITAL | Age: 53
End: 2018-07-02

## 2018-07-02 VITALS
DIASTOLIC BLOOD PRESSURE: 69 MMHG | OXYGEN SATURATION: 98 % | TEMPERATURE: 97.7 F | WEIGHT: 175.71 LBS | RESPIRATION RATE: 16 BRPM | HEART RATE: 65 BPM | BODY MASS INDEX: 30 KG/M2 | SYSTOLIC BLOOD PRESSURE: 119 MMHG | HEIGHT: 64 IN

## 2018-07-02 LAB
B-HCG UR QL: NEGATIVE
INTERNAL NEGATIVE CONTROL: NEGATIVE
INTERNAL POSITIVE CONTROL: POSITIVE
Lab: NORMAL

## 2018-07-02 PROCEDURE — 25010000002 PROPOFOL 10 MG/ML EMULSION: Performed by: ANESTHESIOLOGY

## 2018-07-02 RX ORDER — SODIUM CHLORIDE, SODIUM LACTATE, POTASSIUM CHLORIDE, CALCIUM CHLORIDE 600; 310; 30; 20 MG/100ML; MG/100ML; MG/100ML; MG/100ML
30 INJECTION, SOLUTION INTRAVENOUS CONTINUOUS PRN
Status: DISCONTINUED | OUTPATIENT
Start: 2018-07-02 | End: 2018-07-02 | Stop reason: HOSPADM

## 2018-07-02 RX ORDER — PROPOFOL 10 MG/ML
VIAL (ML) INTRAVENOUS AS NEEDED
Status: DISCONTINUED | OUTPATIENT
Start: 2018-07-02 | End: 2018-07-02 | Stop reason: SURG

## 2018-07-02 RX ORDER — SODIUM CHLORIDE 0.9 % (FLUSH) 0.9 %
1-10 SYRINGE (ML) INJECTION AS NEEDED
Status: DISCONTINUED | OUTPATIENT
Start: 2018-07-02 | End: 2018-07-02 | Stop reason: HOSPADM

## 2018-07-02 RX ORDER — PROPOFOL 10 MG/ML
VIAL (ML) INTRAVENOUS CONTINUOUS PRN
Status: DISCONTINUED | OUTPATIENT
Start: 2018-07-02 | End: 2018-07-02 | Stop reason: SURG

## 2018-07-02 RX ADMIN — PROPOFOL 150 MCG/KG/MIN: 10 INJECTION, EMULSION INTRAVENOUS at 08:03

## 2018-07-02 RX ADMIN — PROPOFOL 100 MG: 10 INJECTION, EMULSION INTRAVENOUS at 08:03

## 2018-07-02 RX ADMIN — SODIUM CHLORIDE, POTASSIUM CHLORIDE, SODIUM LACTATE AND CALCIUM CHLORIDE 30 ML/HR: 600; 310; 30; 20 INJECTION, SOLUTION INTRAVENOUS at 07:19

## 2018-07-02 NOTE — DISCHARGE INSTRUCTIONS
For the next 24 hours patient needs to be with a responsible adult.    For 24 hours DO NOT drive, operate machinery, appliances, drink alcohol, make important decisions or sign legal documents.    Start with a light or bland diet and advance to regular diet as tolerated.    Follow recommendations on procedure report provided by your doctor.    Call Dr Merrill for problems . Problems may include but not limited to: large amounts of bleeding, trouble breathing, repeated vomiting, severe unrelieved pain, fever or chills.

## 2018-07-02 NOTE — ANESTHESIA PREPROCEDURE EVALUATION
Anesthesia Evaluation     Patient summary reviewed and Nursing notes reviewed   NPO Solid Status: > 8 hours  NPO Liquid Status: > 8 hours           Airway   Mallampati: III  TM distance: <3 FB  Neck ROM: full  Possible difficult intubation  Dental - normal exam     Pulmonary - normal exam   Cardiovascular - normal exam    (+) hypertension less than 2 medications,       Neuro/Psych  GI/Hepatic/Renal/Endo    (+) obesity,       Musculoskeletal     Abdominal    Substance History   (+) alcohol use (occasional),      OB/GYN          Other                        Anesthesia Plan    ASA 2     MAC     Anesthetic plan and risks discussed with patient.

## 2018-07-02 NOTE — H&P
Subjective   Felipa Stern is a 52 y.o. female who presents today for a screening colonoscopy.  On questioning, there are no GI complaints or problems.      Past Medical History:   Diagnosis Date   • Allergic 1990    Mostly mild, intermittent.   • Allergic rhinitis    • Back pain    • Breast cyst    • Elevated blood sugar    • Essential hypertension 2/23/2018   • H/O mammogram    • Hyperlipidemia    • Tinnitus    • Visual impairment 1998?    Ocular pressure runs higer than normal.       PHYSICAL EXAM  Pt is in no distress.  Heart regular.  Chest clear.  Abdomen soft.  Rectal deferred to endoscopy.      Assessment/Plan      Plan a screening colonoscopy today.  Risks and benefits were discussed.  Patient is agreeable.  Final recommendations will follow depending on the results.

## 2018-07-02 NOTE — ANESTHESIA POSTPROCEDURE EVALUATION
"Patient: Felipa Stern    Procedure Summary     Date:  07/02/18 Room / Location:   [facility]    Anesthesia Start:  0757 Anesthesia Stop:  0816    Procedure:  COLONOSCOPY INTO CECUM (N/A ) Diagnosis:      Surgeon:  Karel Merrill MD Provider:  Carlyn Stephens MD    Anesthesia Type:  MAC ASA Status:  2          Anesthesia Type: MAC  Last vitals  BP   108/65 (07/02/18 0816)   Temp   36.6 °C (97.8 °F) (07/02/18 0713)   Pulse   72 (07/02/18 0816)   Resp   16 (07/02/18 0816)     SpO2   98 % (07/02/18 0816)     Post Anesthesia Care and Evaluation    Patient location during evaluation: bedside  Patient participation: complete - patient participated  Level of consciousness: awake  Pain management: adequate  Airway patency: patent  Anesthetic complications: No anesthetic complications    Cardiovascular status: acceptable  Respiratory status: acceptable  Hydration status: acceptable    Comments: /65 (BP Location: Left arm, Patient Position: Lying)   Pulse 72   Temp 36.6 °C (97.8 °F) (Oral)   Resp 16   Ht 162.6 cm (64\")   Wt 79.7 kg (175 lb 11.3 oz)   SpO2 98%   BMI 30.16 kg/m²         "

## 2018-09-18 DIAGNOSIS — I10 ESSENTIAL HYPERTENSION: ICD-10-CM

## 2018-09-18 RX ORDER — LOSARTAN POTASSIUM 100 MG/1
TABLET ORAL
Qty: 30 TABLET | Refills: 3 | Status: SHIPPED | OUTPATIENT
Start: 2018-09-18 | End: 2019-02-18 | Stop reason: SDUPTHER

## 2018-10-30 ENCOUNTER — OFFICE VISIT (OUTPATIENT)
Dept: INTERNAL MEDICINE | Facility: CLINIC | Age: 53
End: 2018-10-30

## 2018-10-30 VITALS
RESPIRATION RATE: 16 BRPM | DIASTOLIC BLOOD PRESSURE: 84 MMHG | HEIGHT: 64 IN | OXYGEN SATURATION: 96 % | HEART RATE: 83 BPM | WEIGHT: 183 LBS | BODY MASS INDEX: 31.24 KG/M2 | SYSTOLIC BLOOD PRESSURE: 130 MMHG

## 2018-10-30 DIAGNOSIS — Z78.0 MENOPAUSE: ICD-10-CM

## 2018-10-30 DIAGNOSIS — I10 ESSENTIAL HYPERTENSION: Primary | ICD-10-CM

## 2018-10-30 DIAGNOSIS — Z12.31 ENCOUNTER FOR SCREENING MAMMOGRAM FOR BREAST CANCER: ICD-10-CM

## 2018-10-30 DIAGNOSIS — I10 ESSENTIAL HYPERTENSION: ICD-10-CM

## 2018-10-30 DIAGNOSIS — R73.9 ELEVATED BLOOD SUGAR: ICD-10-CM

## 2018-10-30 DIAGNOSIS — Z23 NEED FOR IMMUNIZATION AGAINST INFLUENZA: ICD-10-CM

## 2018-10-30 DIAGNOSIS — E78.49 OTHER HYPERLIPIDEMIA: ICD-10-CM

## 2018-10-30 LAB
ALBUMIN SERPL-MCNC: 4.2 G/DL (ref 3.5–5.2)
ALBUMIN/GLOB SERPL: 1 G/DL
ALP SERPL-CCNC: 108 U/L (ref 39–117)
ALT SERPL W P-5'-P-CCNC: 22 U/L (ref 1–33)
ANION GAP SERPL CALCULATED.3IONS-SCNC: 11.1 MMOL/L
AST SERPL-CCNC: 19 U/L (ref 1–32)
BILIRUB SERPL-MCNC: 0.4 MG/DL (ref 0.1–1.2)
BUN BLD-MCNC: 17 MG/DL (ref 6–20)
BUN/CREAT SERPL: 19.8 (ref 7–25)
CALCIUM SPEC-SCNC: 10.4 MG/DL (ref 8.6–10.5)
CHLORIDE SERPL-SCNC: 101 MMOL/L (ref 98–107)
CHOLEST SERPL-MCNC: 253 MG/DL (ref 0–200)
CO2 SERPL-SCNC: 26.9 MMOL/L (ref 22–29)
CREAT BLD-MCNC: 0.86 MG/DL (ref 0.57–1)
GFR SERPL CREATININE-BSD FRML MDRD: 70 ML/MIN/1.73
GLOBULIN UR ELPH-MCNC: 4.2 GM/DL
GLUCOSE BLD-MCNC: 116 MG/DL (ref 65–99)
HBA1C MFR BLD: 5.82 % (ref 4.8–5.6)
HDLC SERPL-MCNC: 59 MG/DL (ref 40–60)
LDLC SERPL CALC-MCNC: 167 MG/DL (ref 0–100)
LDLC/HDLC SERPL: 2.83 {RATIO}
POTASSIUM BLD-SCNC: 4.4 MMOL/L (ref 3.5–5.2)
PROT SERPL-MCNC: 8.4 G/DL (ref 6–8.5)
SODIUM BLD-SCNC: 139 MMOL/L (ref 136–145)
TRIGL SERPL-MCNC: 134 MG/DL (ref 0–150)
VLDLC SERPL-MCNC: 26.8 MG/DL (ref 5–40)

## 2018-10-30 PROCEDURE — 99213 OFFICE O/P EST LOW 20 MIN: CPT | Performed by: INTERNAL MEDICINE

## 2018-10-30 PROCEDURE — 36415 COLL VENOUS BLD VENIPUNCTURE: CPT | Performed by: INTERNAL MEDICINE

## 2018-10-30 PROCEDURE — 80053 COMPREHEN METABOLIC PANEL: CPT | Performed by: INTERNAL MEDICINE

## 2018-10-30 PROCEDURE — 90674 CCIIV4 VAC NO PRSV 0.5 ML IM: CPT | Performed by: INTERNAL MEDICINE

## 2018-10-30 PROCEDURE — 83036 HEMOGLOBIN GLYCOSYLATED A1C: CPT | Performed by: INTERNAL MEDICINE

## 2018-10-30 PROCEDURE — 80061 LIPID PANEL: CPT | Performed by: INTERNAL MEDICINE

## 2018-10-30 PROCEDURE — 90471 IMMUNIZATION ADMIN: CPT | Performed by: INTERNAL MEDICINE

## 2018-10-30 NOTE — PROGRESS NOTES
"Subjective   Felipa Stern is a 52 y.o. female here for   Chief Complaint   Patient presents with   • Hyperlipidemia     6 month follow-up   • Hypertension   .    Vitals:    10/30/18 0823 10/30/18 0906   BP: 130/82 130/84   BP Location: Left arm    Patient Position: Sitting    Cuff Size: Adult    Pulse: 83    Resp: 16    SpO2: 96%    Weight: 83 kg (183 lb)    Height: 162.6 cm (64\")        Body mass index is 31.41 kg/m².    Hyperlipidemia   This is a chronic problem. The current episode started more than 1 year ago. The problem is controlled. Recent lipid tests were reviewed and are normal. She has no history of diabetes. Pertinent negatives include no chest pain or shortness of breath.   Hypertension   This is a chronic problem. The current episode started more than 1 year ago. The problem is unchanged. The problem is controlled. Pertinent negatives include no chest pain, palpitations or shortness of breath.        The following portions of the patient's history were reviewed and updated as appropriate: allergies, current medications, past social history and problem list.    Review of Systems   Constitutional: Negative for chills, fatigue and fever.   Respiratory: Negative for cough, shortness of breath and wheezing.    Cardiovascular: Negative for chest pain, palpitations and leg swelling.   Psychiatric/Behavioral: Negative for dysphoric mood and sleep disturbance. The patient is not nervous/anxious.        Objective   Physical Exam   Constitutional: She appears well-developed and well-nourished. No distress.   Cardiovascular: Normal rate, regular rhythm and normal heart sounds.    Pulmonary/Chest: No respiratory distress. She has no wheezes. She has no rales. She exhibits no tenderness.   Musculoskeletal: She exhibits no edema.   Psychiatric: She has a normal mood and affect. Her behavior is normal.   Nursing note and vitals reviewed.      Assessment/Plan   Diagnoses and all orders for this visit:    Essential " hypertension  Comments:  controlled with losartan 50mg daily - call if bp over 130/80  Orders:  -     Comprehensive Metabolic Panel  -     Lipid Panel    Encounter for screening mammogram for breast cancer  -     Mammo Screening Bilateral With CAD; Future    Other hyperlipidemia  Comments:  need diet/ex - continue red yeast rice (taking 1/2 top dose)  Orders:  -     Comprehensive Metabolic Panel  -     Lipid Panel    Elevated blood sugar  Comments:  need low sugar diet  Orders:  -     Hemoglobin A1c    Menopause  -     DEXA Bone Density Axial; Future    Other hyperlipidemia  -     Comprehensive Metabolic Panel  -     Lipid Panel    Essential hypertension  -     Comprehensive Metabolic Panel  -     Lipid Panel    Elevated blood sugar  -     Hemoglobin A1c    Need for immunization against influenza  -     Flucelvax Quad=>4Years ([id number])

## 2018-12-07 ENCOUNTER — APPOINTMENT (OUTPATIENT)
Dept: WOMENS IMAGING | Facility: HOSPITAL | Age: 53
End: 2018-12-07

## 2018-12-07 ENCOUNTER — CLINICAL SUPPORT (OUTPATIENT)
Dept: INTERNAL MEDICINE | Facility: CLINIC | Age: 53
End: 2018-12-07

## 2018-12-07 ENCOUNTER — OFFICE VISIT (OUTPATIENT)
Dept: INTERNAL MEDICINE | Facility: CLINIC | Age: 53
End: 2018-12-07

## 2018-12-07 DIAGNOSIS — Z78.0 MENOPAUSE: ICD-10-CM

## 2018-12-07 DIAGNOSIS — Z12.31 ENCOUNTER FOR SCREENING MAMMOGRAM FOR BREAST CANCER: ICD-10-CM

## 2018-12-07 PROCEDURE — 77067 SCR MAMMO BI INCL CAD: CPT | Performed by: INTERNAL MEDICINE

## 2018-12-07 PROCEDURE — 77067 SCR MAMMO BI INCL CAD: CPT | Performed by: RADIOLOGY

## 2018-12-07 PROCEDURE — 77080 DXA BONE DENSITY AXIAL: CPT | Performed by: INTERNAL MEDICINE

## 2019-02-18 DIAGNOSIS — I10 ESSENTIAL HYPERTENSION: ICD-10-CM

## 2019-02-18 RX ORDER — LOSARTAN POTASSIUM 100 MG/1
TABLET ORAL
Qty: 30 TABLET | Refills: 2 | Status: SHIPPED | OUTPATIENT
Start: 2019-02-18 | End: 2019-06-03 | Stop reason: SDUPTHER

## 2019-05-07 ENCOUNTER — OFFICE VISIT (OUTPATIENT)
Dept: INTERNAL MEDICINE | Facility: CLINIC | Age: 54
End: 2019-05-07

## 2019-05-07 VITALS
HEIGHT: 64 IN | SYSTOLIC BLOOD PRESSURE: 128 MMHG | DIASTOLIC BLOOD PRESSURE: 88 MMHG | WEIGHT: 188.2 LBS | BODY MASS INDEX: 32.13 KG/M2

## 2019-05-07 DIAGNOSIS — E78.49 OTHER HYPERLIPIDEMIA: ICD-10-CM

## 2019-05-07 DIAGNOSIS — R73.09 ELEVATED GLUCOSE: ICD-10-CM

## 2019-05-07 DIAGNOSIS — M25.561 CHRONIC PAIN OF RIGHT KNEE: ICD-10-CM

## 2019-05-07 DIAGNOSIS — G89.29 CHRONIC PAIN OF RIGHT KNEE: ICD-10-CM

## 2019-05-07 DIAGNOSIS — I10 ESSENTIAL HYPERTENSION: Primary | ICD-10-CM

## 2019-05-07 LAB
BACTERIA UR QL AUTO: ABNORMAL /HPF
BASOPHILS # BLD AUTO: 0.03 10*3/MM3 (ref 0–0.2)
BASOPHILS NFR BLD AUTO: 0.4 % (ref 0–1.5)
BILIRUB UR QL STRIP: NEGATIVE
CLARITY UR: CLEAR
COLOR UR: YELLOW
DEPRECATED RDW RBC AUTO: 43.3 FL (ref 37–54)
EOSINOPHIL # BLD AUTO: 0.22 10*3/MM3 (ref 0–0.4)
EOSINOPHIL NFR BLD AUTO: 3.3 % (ref 0.3–6.2)
ERYTHROCYTE [DISTWIDTH] IN BLOOD BY AUTOMATED COUNT: 13.5 % (ref 12.3–15.4)
GLUCOSE UR STRIP-MCNC: NEGATIVE MG/DL
HCT VFR BLD AUTO: 45 % (ref 34–46.6)
HGB BLD-MCNC: 15.2 G/DL (ref 12–15.9)
HGB UR QL STRIP.AUTO: NEGATIVE
HYALINE CASTS UR QL AUTO: ABNORMAL /LPF
KETONES UR QL STRIP: NEGATIVE
LEUKOCYTE ESTERASE UR QL STRIP.AUTO: ABNORMAL
LYMPHOCYTES # BLD AUTO: 2.59 10*3/MM3 (ref 0.7–3.1)
LYMPHOCYTES NFR BLD AUTO: 38.5 % (ref 19.6–45.3)
MCH RBC QN AUTO: 30.5 PG (ref 26.6–33)
MCHC RBC AUTO-ENTMCNC: 33.8 G/DL (ref 31.5–35.7)
MCV RBC AUTO: 90.2 FL (ref 79–97)
MONOCYTES # BLD AUTO: 0.46 10*3/MM3 (ref 0.1–0.9)
MONOCYTES NFR BLD AUTO: 6.8 % (ref 5–12)
MUCOUS THREADS URNS QL MICRO: ABNORMAL /HPF
NEUTROPHILS # BLD AUTO: 3.42 10*3/MM3 (ref 1.7–7)
NEUTROPHILS NFR BLD AUTO: 51 % (ref 42.7–76)
NITRITE UR QL STRIP: NEGATIVE
PH UR STRIP.AUTO: 7 [PH] (ref 5–8)
PLATELET # BLD AUTO: 355 10*3/MM3 (ref 140–450)
PMV BLD AUTO: 9.3 FL (ref 6–12)
PROT UR QL STRIP: NEGATIVE
RBC # BLD AUTO: 4.99 10*6/MM3 (ref 3.77–5.28)
RBC # UR: ABNORMAL /HPF
REF LAB TEST METHOD: ABNORMAL
SP GR UR STRIP: 1.02 (ref 1–1.03)
SQUAMOUS #/AREA URNS HPF: ABNORMAL /HPF
UROBILINOGEN UR QL STRIP: ABNORMAL
WBC NRBC COR # BLD: 6.72 10*3/MM3 (ref 3.4–10.8)
WBC UR QL AUTO: ABNORMAL /HPF

## 2019-05-07 PROCEDURE — 85025 COMPLETE CBC W/AUTO DIFF WBC: CPT | Performed by: INTERNAL MEDICINE

## 2019-05-07 PROCEDURE — 99214 OFFICE O/P EST MOD 30 MIN: CPT | Performed by: INTERNAL MEDICINE

## 2019-05-07 PROCEDURE — 81001 URINALYSIS AUTO W/SCOPE: CPT | Performed by: INTERNAL MEDICINE

## 2019-05-07 NOTE — PROGRESS NOTES
"Subjective   Felipa Stern is a 53 y.o. female here for   Chief Complaint   Patient presents with   • Hyperlipidemia     6 month follow-up   • Hypertension   • Knee Pain     x 4 weeks   .    Vitals:    05/07/19 0909   BP: 128/88   BP Location: Left arm   Patient Position: Sitting   Cuff Size: Adult   Weight: 85.4 kg (188 lb 3.2 oz)   Height: 162.6 cm (64\")       Body mass index is 32.3 kg/m².    Hyperlipidemia   This is a chronic problem. The current episode started more than 1 year ago. The problem is controlled. Recent lipid tests were reviewed and are normal. She has no history of diabetes. Pertinent negatives include no chest pain or shortness of breath.   Hypertension   This is a chronic problem. The current episode started more than 1 year ago. The problem is unchanged. The problem is controlled. Pertinent negatives include no chest pain, palpitations or shortness of breath.   Knee Pain    The incident occurred more than 1 week ago. The incident occurred at home. The injury mechanism was a twisting injury. The pain is present in the right knee. The quality of the pain is described as shooting and stabbing. The pain is moderate. The pain has been fluctuating since onset.        The following portions of the patient's history were reviewed and updated as appropriate: allergies, current medications, past social history and problem list.    Review of Systems   Constitutional: Negative for chills, fatigue and fever.   Respiratory: Negative for cough, shortness of breath and wheezing.    Cardiovascular: Negative for chest pain, palpitations and leg swelling.   Musculoskeletal: Positive for arthralgias (right knee pain).       Objective   Physical Exam   Constitutional: She appears well-developed and well-nourished. No distress.   Cardiovascular: Normal rate, regular rhythm and normal heart sounds.   Pulmonary/Chest: No respiratory distress. She has no wheezes. She has no rales. She exhibits no tenderness. "   Musculoskeletal: Normal range of motion. She exhibits no edema, tenderness or deformity.   Neurological: She is alert. No cranial nerve deficit. She exhibits normal muscle tone.   Skin: Skin is warm and dry.   Psychiatric: She has a normal mood and affect. Her behavior is normal.   Nursing note and vitals reviewed.      Assessment/Plan   Diagnoses and all orders for this visit:    Essential hypertension  Comments:  controlled - call if bp over 140/90  Orders:  -     Comprehensive Metabolic Panel; Future  -     Lipid Panel; Future  -     CBC Auto Differential; Future  -     Urinalysis With Microscopic If Indicated (No Culture) - Urine, Clean Catch; Future    Other hyperlipidemia  Comments:  need diet/ex  Orders:  -     Comprehensive Metabolic Panel; Future  -     Lipid Panel; Future    Chronic pain of right knee  Comments:  mild & improving - call for referral to ortho if any worsening    Elevated glucose  Comments:  need low sugar/carbs in diet & daily exercise  Orders:  -     Hemoglobin A1c; Future

## 2019-05-08 LAB
ALBUMIN SERPL-MCNC: 4.4 G/DL (ref 3.5–5.2)
ALBUMIN/GLOB SERPL: 1.2 G/DL
ALP SERPL-CCNC: 118 U/L (ref 39–117)
ALT SERPL-CCNC: 29 U/L (ref 1–33)
AST SERPL-CCNC: 21 U/L (ref 1–32)
BILIRUB SERPL-MCNC: 0.5 MG/DL (ref 0.2–1.2)
BUN SERPL-MCNC: 16 MG/DL (ref 6–20)
BUN/CREAT SERPL: 22.2 (ref 7–25)
CALCIUM SERPL-MCNC: 10.6 MG/DL (ref 8.6–10.5)
CHLORIDE SERPL-SCNC: 99 MMOL/L (ref 98–107)
CHOLEST SERPL-MCNC: 263 MG/DL (ref 0–200)
CO2 SERPL-SCNC: 25.6 MMOL/L (ref 22–29)
CREAT SERPL-MCNC: 0.72 MG/DL (ref 0.57–1)
GLOBULIN SER CALC-MCNC: 3.8 GM/DL
GLUCOSE SERPL-MCNC: 110 MG/DL (ref 65–99)
HBA1C MFR BLD: 6 % (ref 4.8–5.6)
HDLC SERPL-MCNC: 57 MG/DL (ref 40–60)
LDLC SERPL CALC-MCNC: 173 MG/DL (ref 0–100)
POTASSIUM SERPL-SCNC: 4.6 MMOL/L (ref 3.5–5.2)
PROT SERPL-MCNC: 8.2 G/DL (ref 6–8.5)
SODIUM SERPL-SCNC: 141 MMOL/L (ref 136–145)
TRIGL SERPL-MCNC: 164 MG/DL (ref 0–150)
VLDLC SERPL-MCNC: 32.8 MG/DL

## 2019-05-08 NOTE — PROGRESS NOTES
High sugar/cholesterol/fat - need low fat/sugar diet & more exercise. Slightly high calcium - avoid calcium supplements & recheck several mos.

## 2019-06-03 DIAGNOSIS — I10 ESSENTIAL HYPERTENSION: ICD-10-CM

## 2019-06-03 RX ORDER — LOSARTAN POTASSIUM 100 MG/1
TABLET ORAL
Qty: 30 TABLET | Refills: 1 | Status: SHIPPED | OUTPATIENT
Start: 2019-06-03 | End: 2019-08-15 | Stop reason: SDUPTHER

## 2019-08-15 DIAGNOSIS — I10 ESSENTIAL HYPERTENSION: ICD-10-CM

## 2019-08-15 RX ORDER — LOSARTAN POTASSIUM 100 MG/1
TABLET ORAL
Qty: 30 TABLET | Refills: 0 | Status: SHIPPED | OUTPATIENT
Start: 2019-08-15 | End: 2019-09-14 | Stop reason: SDUPTHER

## 2019-09-14 DIAGNOSIS — I10 ESSENTIAL HYPERTENSION: ICD-10-CM

## 2019-09-16 RX ORDER — LOSARTAN POTASSIUM 100 MG/1
TABLET ORAL
Qty: 30 TABLET | Refills: 5 | Status: SHIPPED | OUTPATIENT
Start: 2019-09-16 | End: 2019-11-06 | Stop reason: SDUPTHER

## 2019-09-21 DIAGNOSIS — I10 ESSENTIAL HYPERTENSION: ICD-10-CM

## 2019-09-23 RX ORDER — LOSARTAN POTASSIUM 100 MG/1
TABLET ORAL
Qty: 30 TABLET | Refills: 5 | Status: SHIPPED | OUTPATIENT
Start: 2019-09-23 | End: 2020-05-04

## 2019-11-06 ENCOUNTER — OFFICE VISIT (OUTPATIENT)
Dept: INTERNAL MEDICINE | Facility: CLINIC | Age: 54
End: 2019-11-06

## 2019-11-06 VITALS
SYSTOLIC BLOOD PRESSURE: 134 MMHG | WEIGHT: 183 LBS | HEART RATE: 81 BPM | OXYGEN SATURATION: 97 % | BODY MASS INDEX: 31.41 KG/M2 | DIASTOLIC BLOOD PRESSURE: 86 MMHG

## 2019-11-06 DIAGNOSIS — M25.511 ACUTE PAIN OF RIGHT SHOULDER: Primary | ICD-10-CM

## 2019-11-06 PROCEDURE — 99213 OFFICE O/P EST LOW 20 MIN: CPT | Performed by: NURSE PRACTITIONER

## 2019-11-06 RX ORDER — METHYLPREDNISOLONE 4 MG/1
TABLET ORAL
Qty: 21 TABLET | Refills: 0 | Status: SHIPPED | OUTPATIENT
Start: 2019-11-06 | End: 2019-11-13

## 2019-11-06 NOTE — PROGRESS NOTES
Subjective     Felipa Stern is a 53 y.o. female.         She presents for a R shoulder injury initially suffered a week and half ago which then got better with ROM, heat, and ibuprofen but then she reinjured it yesterday. She was pushing a glass door open and turned her body awkardly both times causing pain to her R shoulder. Her pain is a dull ache and is mostly presents near her R trapezius area. She reports numbness and tingling down to her R hand. Denies weakness.       Shoulder Injury    The incident occurred at work. The incident occurred more than 1 week ago. The quality of the pain is described as aching. The pain does not radiate. The pain is at a severity of 2/10. Associated symptoms include numbness and tingling. Pertinent negatives include no chest pain or muscle weakness. She has tried elevation, ice, heat and NSAIDs for the symptoms. The treatment provided moderate relief.        The following portions of the patient's history were reviewed and updated as appropriate: allergies, current medications, past social history and problem list.    Review of Systems   Constitutional: Negative for fever.   Cardiovascular: Negative for chest pain.   Musculoskeletal: Negative for joint swelling.   Skin: Negative for color change and wound.   Neurological: Positive for tingling and numbness. Negative for weakness.       Objective     /86 (BP Location: Left arm, Patient Position: Sitting, Cuff Size: Adult)   Pulse 81   Wt 83 kg (183 lb)   SpO2 97%   BMI 31.41 kg/m²     Physical Exam   Constitutional: She appears well-developed and well-nourished.   HENT:   Head: Normocephalic and atraumatic.   Pulmonary/Chest: Effort normal. No respiratory distress.   Musculoskeletal: Normal range of motion.        Right shoulder: She exhibits pain (mild pain with ROM). She exhibits normal range of motion, no tenderness, no bony tenderness, no swelling, no effusion and normal strength.   Neurological: She is alert. She  has normal strength. No sensory deficit.   Skin: Skin is warm and dry.   Psychiatric: She has a normal mood and affect. Her behavior is normal. Judgment and thought content normal.   Vitals reviewed.      Assessment/Plan     Felipa was seen today for shoulder injury.    Diagnoses and all orders for this visit:    Acute pain of right shoulder  -     methylPREDNISolone (MEDROL, TAN,) 4 MG tablet; Take as directed on package instructions.  -     Ambulatory Referral to Physical Therapy Evaluate and treat    She will continue heat, ice, ROM exercises, and NSAIDs.    Will refer to PT since she reinjured Her R shoulder, she will need strengthening exercises.    Oral steroids given to help with the numbness and tingling down her R arm that goes into her R hand.    Return for worsening of sx.

## 2019-11-07 ENCOUNTER — TELEPHONE (OUTPATIENT)
Dept: INTERNAL MEDICINE | Facility: CLINIC | Age: 54
End: 2019-11-07
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
## 2019-11-08 NOTE — TELEPHONE ENCOUNTER
Message left for Carline downs/ Rommel Guardado informing her that I've spoke to our occupational health department and Ms. Stern would have to come into our office and speak to Dayana or Salma in regards to this request for medical records and fill out appropriate paperwork with them since this is a workers comp claim.

## 2019-11-13 ENCOUNTER — OFFICE VISIT (OUTPATIENT)
Dept: INTERNAL MEDICINE | Facility: CLINIC | Age: 54
End: 2019-11-13

## 2019-11-13 VITALS
HEIGHT: 64 IN | BODY MASS INDEX: 31.24 KG/M2 | WEIGHT: 183 LBS | SYSTOLIC BLOOD PRESSURE: 132 MMHG | DIASTOLIC BLOOD PRESSURE: 88 MMHG

## 2019-11-13 DIAGNOSIS — G89.29 CHRONIC PAIN OF LEFT KNEE: ICD-10-CM

## 2019-11-13 DIAGNOSIS — Z00.00 ANNUAL PHYSICAL EXAM: Primary | ICD-10-CM

## 2019-11-13 DIAGNOSIS — R20.2 RIGHT HAND PARESTHESIA: ICD-10-CM

## 2019-11-13 DIAGNOSIS — J30.2 SEASONAL ALLERGIC RHINITIS, UNSPECIFIED TRIGGER: ICD-10-CM

## 2019-11-13 DIAGNOSIS — M25.562 CHRONIC PAIN OF LEFT KNEE: ICD-10-CM

## 2019-11-13 DIAGNOSIS — E78.49 OTHER HYPERLIPIDEMIA: ICD-10-CM

## 2019-11-13 DIAGNOSIS — R73.9 ELEVATED BLOOD SUGAR: ICD-10-CM

## 2019-11-13 DIAGNOSIS — I10 ESSENTIAL HYPERTENSION: ICD-10-CM

## 2019-11-13 DIAGNOSIS — Z12.31 ENCOUNTER FOR SCREENING MAMMOGRAM FOR BREAST CANCER: ICD-10-CM

## 2019-11-13 DIAGNOSIS — Z12.11 COLON CANCER SCREENING: ICD-10-CM

## 2019-11-13 DIAGNOSIS — R31.21 ASYMPTOMATIC MICROSCOPIC HEMATURIA: ICD-10-CM

## 2019-11-13 DIAGNOSIS — Z12.4 CERVICAL CANCER SCREENING: ICD-10-CM

## 2019-11-13 DIAGNOSIS — Z82.49 FAMILY HISTORY OF HEART DISEASE: ICD-10-CM

## 2019-11-13 LAB
ALBUMIN SERPL-MCNC: 4 G/DL (ref 3.5–5.2)
ALBUMIN/GLOB SERPL: 1.4 G/DL
ALP SERPL-CCNC: 99 U/L (ref 39–117)
ALT SERPL-CCNC: 18 U/L (ref 1–33)
AST SERPL-CCNC: 14 U/L (ref 1–32)
BILIRUB SERPL-MCNC: 0.5 MG/DL (ref 0.2–1.2)
BUN SERPL-MCNC: 22 MG/DL (ref 6–20)
BUN/CREAT SERPL: 29.3 (ref 7–25)
CALCIUM SERPL-MCNC: 9 MG/DL (ref 8.6–10.5)
CHLORIDE SERPL-SCNC: 100 MMOL/L (ref 98–107)
CHOLEST SERPL-MCNC: 234 MG/DL (ref 0–200)
CO2 SERPL-SCNC: 27.2 MMOL/L (ref 22–29)
CREAT SERPL-MCNC: 0.75 MG/DL (ref 0.57–1)
GLOBULIN SER CALC-MCNC: 2.9 GM/DL
GLUCOSE SERPL-MCNC: 97 MG/DL (ref 65–99)
HBA1C MFR BLD: 5.9 % (ref 4.8–5.6)
HDLC SERPL-MCNC: 54 MG/DL (ref 40–60)
HEMOCCULT STL QL IA: NEGATIVE
LDLC SERPL CALC-MCNC: 149 MG/DL (ref 0–100)
POTASSIUM SERPL-SCNC: 4.5 MMOL/L (ref 3.5–5.2)
PROT SERPL-MCNC: 6.9 G/DL (ref 6–8.5)
SODIUM SERPL-SCNC: 137 MMOL/L (ref 136–145)
TRIGL SERPL-MCNC: 153 MG/DL (ref 0–150)
VLDLC SERPL-MCNC: 30.6 MG/DL

## 2019-11-13 PROCEDURE — 99396 PREV VISIT EST AGE 40-64: CPT | Performed by: INTERNAL MEDICINE

## 2019-11-13 PROCEDURE — 82274 ASSAY TEST FOR BLOOD FECAL: CPT | Performed by: INTERNAL MEDICINE

## 2019-11-13 NOTE — TELEPHONE ENCOUNTER
The patient signed a [facility] form to release the records from her 11/6/2019 visit to Carline Guardado. The form was faxed to [facility] at [phone].

## 2019-11-13 NOTE — PATIENT INSTRUCTIONS
Health Maintenance, Female  Adopting a healthy lifestyle and getting preventive care can go a long way to promote health and wellness. Talk with your health care provider about what schedule of regular examinations is right for you. This is a good chance for you to check in with your provider about disease prevention and staying healthy.  In between checkups, there are plenty of things you can do on your own. Experts have done a lot of research about which lifestyle changes and preventive measures are most likely to keep you healthy. Ask your health care provider for more information.  Weight and diet  Eat a healthy diet  · Be sure to include plenty of vegetables, fruits, low-fat dairy products, and lean protein.  · Do not eat a lot of foods high in solid fats, added sugars, or salt.  · Get regular exercise. This is one of the most important things you can do for your health.  ? Most adults should exercise for at least 150 minutes each week. The exercise should increase your heart rate and make you sweat (moderate-intensity exercise).  ? Most adults should also do strengthening exercises at least twice a week. This is in addition to the moderate-intensity exercise.  Maintain a healthy weight  · Body mass index (BMI) is a measurement that can be used to identify possible weight problems. It estimates body fat based on height and weight. Your health care provider can help determine your BMI and help you achieve or maintain a healthy weight.  · For females 20 years of age and older:  ? A BMI below 18.5 is considered underweight.  ? A BMI of 18.5 to 24.9 is normal.  ? A BMI of 25 to 29.9 is considered overweight.  ? A BMI of 30 and above is considered obese.  Watch levels of cholesterol and blood lipids  · You should start having your blood tested for lipids and cholesterol at 20 years of age, then have this test every 5 years.  · You may need to have your cholesterol levels checked more often if:  ? Your lipid or  cholesterol levels are high.  ? You are older than 50 years of age.  ? You are at high risk for heart disease.  Cancer screening  Lung Cancer  · Lung cancer screening is recommended for adults 55-80 years old who are at high risk for lung cancer because of a history of smoking.  · A yearly low-dose CT scan of the lungs is recommended for people who:  ? Currently smoke.  ? Have quit within the past 15 years.  ? Have at least a 30-pack-year history of smoking. A pack year is smoking an average of one pack of cigarettes a day for 1 year.  · Yearly screening should continue until it has been 15 years since you quit.  · Yearly screening should stop if you develop a health problem that would prevent you from having lung cancer treatment.  Breast Cancer  · Practice breast self-awareness. This means understanding how your breasts normally appear and feel.  · It also means doing regular breast self-exams. Let your health care provider know about any changes, no matter how small.  · If you are in your 20s or 30s, you should have a clinical breast exam (CBE) by a health care provider every 1-3 years as part of a regular health exam.  · If you are 40 or older, have a CBE every year. Also consider having a breast X-ray (mammogram) every year.  · If you have a family history of breast cancer, talk to your health care provider about genetic screening.  · If you are at high risk for breast cancer, talk to your health care provider about having an MRI and a mammogram every year.  · Breast cancer gene (BRCA) assessment is recommended for women who have family members with BRCA-related cancers. BRCA-related cancers include:  ? Breast.  ? Ovarian.  ? Tubal.  ? Peritoneal cancers.  · Results of the assessment will determine the need for genetic counseling and BRCA1 and BRCA2 testing.  Cervical Cancer  Your health care provider may recommend that you be screened regularly for cancer of the pelvic organs (ovaries, uterus, and vagina).  This screening involves a pelvic examination, including checking for microscopic changes to the surface of your cervix (Pap test). You may be encouraged to have this screening done every 3 years, beginning at age 21.  · For women ages 30-65, health care providers may recommend pelvic exams and Pap testing every 3 years, or they may recommend the Pap and pelvic exam, combined with testing for human papilloma virus (HPV), every 5 years. Some types of HPV increase your risk of cervical cancer. Testing for HPV may also be done on women of any age with unclear Pap test results.  · Other health care providers may not recommend any screening for nonpregnant women who are considered low risk for pelvic cancer and who do not have symptoms. Ask your health care provider if a screening pelvic exam is right for you.  · If you have had past treatment for cervical cancer or a condition that could lead to cancer, you need Pap tests and screening for cancer for at least 20 years after your treatment. If Pap tests have been discontinued, your risk factors (such as having a new sexual partner) need to be reassessed to determine if screening should resume. Some women have medical problems that increase the chance of getting cervical cancer. In these cases, your health care provider may recommend more frequent screening and Pap tests.  Colorectal Cancer  · This type of cancer can be detected and often prevented.  · Routine colorectal cancer screening usually begins at 50 years of age and continues through 75 years of age.  · Your health care provider may recommend screening at an earlier age if you have risk factors for colon cancer.  · Your health care provider may also recommend using home test kits to check for hidden blood in the stool.  · A small camera at the end of a tube can be used to examine your colon directly (sigmoidoscopy or colonoscopy). This is done to check for the earliest forms of colorectal cancer.  · Routine  screening usually begins at age 50.  · Direct examination of the colon should be repeated every 5-10 years through 75 years of age. However, you may need to be screened more often if early forms of precancerous polyps or small growths are found.  Skin Cancer  · Check your skin from head to toe regularly.  · Tell your health care provider about any new moles or changes in moles, especially if there is a change in a mole's shape or color.  · Also tell your health care provider if you have a mole that is larger than the size of a pencil eraser.  · Always use sunscreen. Apply sunscreen liberally and repeatedly throughout the day.  · Protect yourself by wearing long sleeves, pants, a wide-brimmed hat, and sunglasses whenever you are outside.  Heart disease, diabetes, and high blood pressure  · High blood pressure causes heart disease and increases the risk of stroke. High blood pressure is more likely to develop in:  ? People who have blood pressure in the high end of the normal range (130-139/85-89 mm Hg).  ? People who are overweight or obese.  ? People who are .  · If you are 18-39 years of age, have your blood pressure checked every 3-5 years. If you are 40 years of age or older, have your blood pressure checked every year. You should have your blood pressure measured twice--once when you are at a hospital or clinic, and once when you are not at a hospital or clinic. Record the average of the two measurements. To check your blood pressure when you are not at a hospital or clinic, you can use:  ? An automated blood pressure machine at a pharmacy.  ? A home blood pressure monitor.  · If you are between 55 years and 79 years old, ask your health care provider if you should take aspirin to prevent strokes.  · Have regular diabetes screenings. This involves taking a blood sample to check your fasting blood sugar level.  ? If you are at a normal weight and have a low risk for diabetes, have this test once  every three years after 45 years of age.  ? If you are overweight and have a high risk for diabetes, consider being tested at a younger age or more often.  Preventing infection  Hepatitis B  · If you have a higher risk for hepatitis B, you should be screened for this virus. You are considered at high risk for hepatitis B if:  ? You were born in a country where hepatitis B is common. Ask your health care provider which countries are considered high risk.  ? Your parents were born in a high-risk country, and you have not been immunized against hepatitis B (hepatitis B vaccine).  ? You have HIV or AIDS.  ? You use needles to inject street drugs.  ? You live with someone who has hepatitis B.  ? You have had sex with someone who has hepatitis B.  ? You get hemodialysis treatment.  ? You take certain medicines for conditions, including cancer, organ transplantation, and autoimmune conditions.  Hepatitis C  · Blood testing is recommended for:  ? Everyone born from 1945 through 1965.  ? Anyone with known risk factors for hepatitis C.  Sexually transmitted infections (STIs)  · You should be screened for sexually transmitted infections (STIs) including gonorrhea and chlamydia if:  ? You are sexually active and are younger than 24 years of age.  ? You are older than 24 years of age and your health care provider tells you that you are at risk for this type of infection.  ? Your sexual activity has changed since you were last screened and you are at an increased risk for chlamydia or gonorrhea. Ask your health care provider if you are at risk.  · If you do not have HIV, but are at risk, it may be recommended that you take a prescription medicine daily to prevent HIV infection. This is called pre-exposure prophylaxis (PrEP). You are considered at risk if:  ? You are sexually active and do not regularly use condoms or know the HIV status of your partner(s).  ? You take drugs by injection.  ? You are sexually active with a partner  who has HIV.  Talk with your health care provider about whether you are at high risk of being infected with HIV. If you choose to begin PrEP, you should first be tested for HIV. You should then be tested every 3 months for as long as you are taking PrEP.  Pregnancy  · If you are premenopausal and you may become pregnant, ask your health care provider about preconception counseling.  · If you may become pregnant, take 400 to 800 micrograms (mcg) of folic acid every day.  · If you want to prevent pregnancy, talk to your health care provider about birth control (contraception).  Osteoporosis and menopause  · Osteoporosis is a disease in which the bones lose minerals and strength with aging. This can result in serious bone fractures. Your risk for osteoporosis can be identified using a bone density scan.  · If you are 65 years of age or older, or if you are at risk for osteoporosis and fractures, ask your health care provider if you should be screened.  · Ask your health care provider whether you should take a calcium or vitamin D supplement to lower your risk for osteoporosis.  · Menopause may have certain physical symptoms and risks.  · Hormone replacement therapy may reduce some of these symptoms and risks.  Talk to your health care provider about whether hormone replacement therapy is right for you.  Follow these instructions at home:  · Schedule regular health, dental, and eye exams.  · Stay current with your immunizations.  · Do not use any tobacco products including cigarettes, chewing tobacco, or electronic cigarettes.  · If you are pregnant, do not drink alcohol.  · If you are breastfeeding, limit how much and how often you drink alcohol.  · Limit alcohol intake to no more than 1 drink per day for nonpregnant women. One drink equals 12 ounces of beer, 5 ounces of wine, or 1½ ounces of hard liquor.  · Do not use street drugs.  · Do not share needles.  · Ask your health care provider for help if you need support  or information about quitting drugs.  · Tell your health care provider if you often feel depressed.  · Tell your health care provider if you have ever been abused or do not feel safe at home.  This information is not intended to replace advice given to you by your health care provider. Make sure you discuss any questions you have with your health care provider.  Document Released: 07/02/2012 Document Revised: 05/25/2017 Document Reviewed: 09/20/2016  [employer] Patient Education © 2019 [employer]

## 2019-11-13 NOTE — PROGRESS NOTES
"Subjective   Felipa Stern is a 53 y.o. female here for   Chief Complaint   Patient presents with   • Annual Exam   • Hyperglycemia   • Hyperlipidemia   • Hypertension   .    Vitals:    11/13/19 0820   BP: 132/88   BP Location: Left arm   Patient Position: Sitting   Cuff Size: Adult   Weight: 83 kg (183 lb)   Height: 162.6 cm (64\")       Body mass index is 31.41 kg/m².    Hyperglycemia   This is a recurrent problem. The current episode started more than 1 year ago. The problem occurs intermittently. Associated symptoms include arthralgias (right shoulder & left knee pain) and numbness (tingling right arm/finger). Pertinent negatives include no abdominal pain, chest pain, chills, congestion, coughing, fatigue, fever, headaches, joint swelling, myalgias, nausea, neck pain, rash, sore throat, vomiting or weakness.   Hyperlipidemia   This is a chronic problem. The current episode started more than 1 year ago. The problem is controlled. Recent lipid tests were reviewed and are normal. She has no history of diabetes. Pertinent negatives include no chest pain, myalgias or shortness of breath.   Hypertension   This is a chronic problem. The current episode started more than 1 year ago. The problem is unchanged. The problem is controlled. Pertinent negatives include no chest pain, headaches, neck pain, palpitations or shortness of breath.        The following portions of the patient's history were reviewed and updated as appropriate: allergies, current medications, past social history and problem list.    Review of Systems   Constitutional: Negative for activity change, appetite change, chills, fatigue, fever and unexpected weight change.   HENT: Positive for tinnitus. Negative for congestion, ear pain, mouth sores, sinus pain, sore throat, trouble swallowing and voice change.    Eyes: Negative for pain and visual disturbance.   Respiratory: Negative for cough, choking, shortness of breath and wheezing.    Cardiovascular: " Negative for chest pain, palpitations and leg swelling.   Gastrointestinal: Negative for abdominal pain, blood in stool, constipation, diarrhea, nausea and vomiting.   Endocrine: Negative for cold intolerance, heat intolerance, polydipsia and polyuria.   Genitourinary: Negative for difficulty urinating, dysuria, enuresis, flank pain, frequency, hematuria, urgency and vaginal bleeding.   Musculoskeletal: Positive for arthralgias (right shoulder & left knee pain). Negative for back pain, gait problem, joint swelling, myalgias, neck pain and neck stiffness.   Skin: Negative for color change and rash.   Allergic/Immunologic: Positive for environmental allergies. Negative for food allergies and immunocompromised state.   Neurological: Positive for numbness (tingling right arm/finger). Negative for dizziness, tremors, seizures, syncope, speech difficulty, weakness and headaches.   Hematological: Negative for adenopathy. Does not bruise/bleed easily.   Psychiatric/Behavioral: Negative for agitation, confusion, decreased concentration, dysphoric mood, sleep disturbance and suicidal ideas. The patient is not nervous/anxious.        Objective   Physical Exam   Constitutional: She appears well-developed and well-nourished.   HENT:   Right Ear: Hearing, tympanic membrane, external ear and ear canal normal.   Left Ear: Hearing, tympanic membrane, external ear and ear canal normal.   Nose: Right sinus exhibits no maxillary sinus tenderness and no frontal sinus tenderness. Left sinus exhibits no maxillary sinus tenderness and no frontal sinus tenderness.   Eyes: Conjunctivae, EOM and lids are normal. Pupils are equal, round, and reactive to light.   Neck: Trachea normal. Neck supple. No JVD present. Carotid bruit is not present. No tracheal deviation present. No thyroid mass and no thyromegaly present.   Cardiovascular: Normal rate, regular rhythm, S1 normal and S2 normal. Exam reveals no gallop and no friction rub.   No murmur  heard.  Pulses:       Carotid pulses are 2+ on the right side, and 2+ on the left side.       Radial pulses are 2+ on the right side, and 2+ on the left side.        Dorsalis pedis pulses are 2+ on the right side, and 2+ on the left side.        Posterior tibial pulses are 2+ on the right side, and 2+ on the left side.   Pulmonary/Chest: Effort normal and breath sounds normal. Chest wall is not dull to percussion. Right breast exhibits no inverted nipple, no mass, no nipple discharge, no skin change and no tenderness. Left breast exhibits no inverted nipple, no mass, no nipple discharge, no skin change and no tenderness.   Abdominal: Soft. Normal aorta and bowel sounds are normal. She exhibits no abdominal bruit. There is no hepatosplenomegaly. There is no tenderness. There is no rebound and no guarding. No hernia.   Genitourinary: Rectum normal, vagina normal and uterus normal. Rectal exam shows guaiac negative stool. No labial fusion. There is no rash, tenderness, lesion or injury on the right labia. There is no rash, tenderness, lesion or injury on the left labia. Cervix exhibits no discharge. Right adnexum displays no mass, no tenderness and no fullness. Left adnexum displays no mass, no tenderness and no fullness.   Musculoskeletal: Normal range of motion. She exhibits no edema.   Lymphadenopathy:     She has no cervical adenopathy.     She has no axillary adenopathy.        Right: No supraclavicular adenopathy present.        Left: No supraclavicular adenopathy present.   Neurological: She is alert. She has normal strength. No cranial nerve deficit or sensory deficit. She displays a negative Romberg sign.   Reflex Scores:       Patellar reflexes are 2+ on the right side and 2+ on the left side.  Skin: Skin is warm and dry.   Nursing note and vitals reviewed.      Assessment/Plan   Diagnoses and all orders for this visit:    Annual physical exam    Essential hypertension  Comments:  controlled - call if bp over  130/80  Orders:  -     Comprehensive Metabolic Panel; Future  -     Lipid Panel; Future  -     Comprehensive Metabolic Panel  -     Lipid Panel    Other hyperlipidemia  Comments:  need diet/ex  Orders:  -     Comprehensive Metabolic Panel; Future  -     Lipid Panel; Future  -     Comprehensive Metabolic Panel  -     Lipid Panel    Seasonal allergic rhinitis, unspecified trigger  Comments:  call if worse    Elevated blood sugar  Comments:  need low sugar/carb diet & daily exercise  Orders:  -     Hemoglobin A1c; Future  -     Hemoglobin A1c    Family history of heart disease    Asymptomatic microscopic hematuria  Comments:  ok 2019    Colon cancer screening  -     POC FECAL OCCULT BLOOD BY IMMUNOASSAY    Cervical cancer screening  -     Pap IG, HPV-hr; Future    Encounter for screening mammogram for breast cancer  -     Mammo Screening Bilateral With CAD; Future    Chronic pain of left knee  Comments:  for 1 mo (no injury) -refer to PT  Orders:  -     Ambulatory Referral to Physical Therapy Ortho, Evaluate and treat    Right hand paresthesia  Comments:  trial of CTS splint for sev wks - will discuss with PT

## 2019-11-15 NOTE — PROGRESS NOTES
High sugar/cholesterol/fat - need low fat/sugar diet & more exercise. Labs are better - recheck 3-4 mos.

## 2019-11-18 LAB
CHROM ANALY OVERALL INTERP-IMP: NORMAL
CONV .: NORMAL
CONV PERFORMED BY:: NORMAL
DX ICD CODE: NORMAL
HIV 1 & 2 AB SER-IMP: NORMAL
HPV I/H RISK 1 DNA CVX QL PROBE+SIG AMP: NEGATIVE
Lab: NORMAL
REF LAB TEST METHOD: NORMAL
STAT OF ADQ CVX/VAG CYTO-IMP: NORMAL

## 2019-11-26 ENCOUNTER — HOSPITAL ENCOUNTER (OUTPATIENT)
Dept: PHYSICAL THERAPY | Facility: HOSPITAL | Age: 54
Setting detail: THERAPIES SERIES
Discharge: HOME | End: 2019-11-26

## 2019-11-26 DIAGNOSIS — G89.29 CHRONIC PAIN OF LEFT KNEE: Primary | ICD-10-CM

## 2019-11-26 DIAGNOSIS — M76.32 IT BAND SYNDROME, LEFT: ICD-10-CM

## 2019-11-26 DIAGNOSIS — M25.562 CHRONIC PAIN OF LEFT KNEE: Primary | ICD-10-CM

## 2019-11-26 PROCEDURE — 97110 THERAPEUTIC EXERCISES: CPT | Performed by: PHYSICAL THERAPIST

## 2019-11-26 PROCEDURE — 97161 PT EVAL LOW COMPLEX 20 MIN: CPT | Performed by: PHYSICAL THERAPIST

## 2019-11-26 NOTE — THERAPY EVALUATION
Outpatient Physical Therapy Ortho Initial Evaluation  [facility]     Patient Name: Felipa Stern  : 1965  MRN: [id number]  Today's Date: 2019      Visit Date: 2019    Patient Active Problem List   Diagnosis   • Hyperlipidemia   • Elevated blood sugar   • Allergic rhinitis   • Tinnitus   • Plantar fasciitis, bilateral   • Chronic pain of left knee   • Family history of heart disease   • Paresthesia   • Asymptomatic microscopic hematuria   • Essential hypertension   • Mass of leg, left   • Elevated glucose   • Right hand paresthesia        Past Medical History:   Diagnosis Date   • Allergic 1990    Mostly mild, intermittent.   • Allergic rhinitis    • Back pain    • Breast cyst    • Elevated blood sugar    • Essential hypertension 2018   • H/O mammogram    • Hyperlipidemia    • Tinnitus    • Visual impairment ?    Ocular pressure runs higer than normal.        Past Surgical History:   Procedure Laterality Date   • COLONOSCOPY N/A 2018    Procedure: COLONOSCOPY INTO CECUM;  Surgeon: Karel Merrill MD;  Location: [facility] ENDOSCOPY;  Service: General   • PAP SMEAR N/A 2012       Visit Dx:     ICD-10-CM ICD-9-CM   1. Chronic pain of left knee M25.562 719.46    G89.29 338.29   2. It band syndrome, left M76.32 728.89         Patient History     Row Name 19 0800             History    Chief Complaint  Pain getting up off the floor  -CJ      Type of Pain  Knee pain  -CJ      Brief Description of Current Complaint  It only hurts when I am trying to get up off the floor with my weight through the L knee cap.  -CJ      Previous treatment for THIS PROBLEM  Other (comment) brace  -CJ      Patient/Caregiver Goals  Relieve pain;Know what to do to help the symptoms  -CJ      Current Tobacco Use  none  -CJ      Patient's Rating of General Health  Very good  -CJ      Hand Dominance  right-handed  -CJ      Are you or can you be pregnant  No  -CJ         Pain     Pain Location  Knee   -CJ      Pain at Present  0  -CJ      Pain at Best  0  -CJ      Pain at Worst  10  -CJ      Pain Frequency  Intermittent  -CJ      Pain Description  Stabbing;Sharp  -CJ      What Performance Factors Make the Current Problem(s) WORSE?  putting weight on my L knee on all fours  -CJ      Is your sleep disturbed?  No  -CJ      Difficulties at work?  no  -CJ      Difficulties with ADL's?  no  -CJ      Difficulties with recreational activities?  no  -CJ         Fall Risk Assessment    Any falls in the past year:  No  -CJ         Services    Prior Rehab/Home Health Experiences  No  -CJ         Daily Activities    Primary Language  English  -CJ      Are you able to read  Yes  -CJ      Are you able to write  Yes  -CJ      How does patient learn best?  Listening;Pictures/Video  -CJ      Teaching needs identified  Home Exercise Program;Management of Condition  -CJ      Patient is concerned about/has problems with  Other (comment);Flexibility getting on/off the floor  -CJ      Does patient have problems with the following?  None  -CJ      Barriers to learning  None  -CJ      Pt Participated in POC and Goals  Yes  -CJ         Safety    Are you being hurt, hit, or frightened by anyone at home or in your life?  No  -CJ      Are you being neglected by a caregiver  No  -CJ        User Key  (r) = Recorded By, (t) = Taken By, (c) = Cosigned By    Initials Name Provider Type    CJ Jett Bocanegra, PT Physical Therapist          PT Ortho     Row Name 11/26/19 0800       Posture/Observations    Posture/Observations Comments  wearing brace  -CJ       Quarter Clearing    Quarter Clearing  Lower Quarter Clearing  -CJ       Myotomal Screen- Lower Quarter Clearing    Hip flexion (L2)  Bilateral:;4+ (Good +)  -CJ    Knee extension (L3)  Bilateral:;4+ (Good +)  -CJ    Ankle DF (L4)  Bilateral:;4+ (Good +)  -CJ    Ankle PF (S1)  Bilateral:;4+ (Good +)  -CJ    Knee flexion (S2)  Bilateral:;4+ (Good +)  -CJ       Knee Special Tests    Valgus stress  (MCL lesion)  Left:;Negative  -CJ    Varus stress (LCL lesion)  Left:;Negative  -CJ    Apley’s compression test (meniscal lesion vs OA)  Left:;Negative  -CJ    Gray’s test (meniscal lesion)  Left:;Negative  -CJ    Nixon compression test (distal ITB syndrome)  Left:;Positive  -CJ       Right Lower Ext    Rt Knee Extension/Flexion AROM  0-125  -CJ       MMT (Manual Muscle Testing)    General MMT Comments  LLE grossly 4+/5  -CJ       Lower Extremity Flexibility    Hamstrings  Left:;Moderately limited  -CJ    Quadriceps  Left:;Moderately limited  -CJ    ITB  Left:;Severely limited  -CJ    Gastrocnemius  Moderately limited;Bilateral:  -CJ       Balance Skills Training    SLS  10 sec  -CJ    Rhomberg  30+ sec  -CJ       Gait/Stairs Assessment/Training    Comment (Gait/Stairs)  no gait deviations noted.  -      User Key  (r) = Recorded By, (t) = Taken By, (c) = Cosigned By    Initials Name Provider Type    Jett Bae, PT Physical Therapist                      Therapy Education  Education Details: HEP given  Given: HEP, Symptoms/condition management  Program: New  How Provided: Verbal, Demonstration, Written  Provided to: Patient  Level of Understanding: Teach back education performed, Verbalized     PT OP Goals     Row Name 11/26/19 0800          PT Short Term Goals    STG 1  Patient will be independent and compliant with initial home exercise program.  -     STG 2  Patient will be independent with education for symptom management, posture, body mechanics, and strategies to minimize stress on affected tissues  -        Long Term Goals    LTG 1  Patient will be independent and compliant with advanced home exercise program to facilitate self-management of symptoms once discharged from formal therapy   -     LTG 2  Pt. will report L knee pain </= 1-2/10 with all daily activities.  -       User Key  (r) = Recorded By, (t) = Taken By, (c) = Cosigned By    Initials Name Provider Type    Jett Bae,  PT Physical Therapist          PT Assessment/Plan     Row Name 11/26/19 0900          PT Assessment    Functional Limitations  Limitations in functional capacity and performance;Limitations in community activities;Limitation in home management  -     Impairments  Impaired flexibility;Pain  -CJ     Assessment Comments  Mrs. Stern is a 55 year old female who presents to clinic with L knee pain. She reports decreased pain since intitiating use of knee sleeve. She reports she only has pain when she kneels on her L knee to transfer off the floor (a task she does not perform often). She reports she can avoid this pain by kneeling on to the R knee instead. Special testing was negative for meniscal involvement but positive for ITBand involvement. AROM and strength were full. She is appropriate for skilled therapy to address LE flexibility issues and establish a home strengthening program.   -     Please refer to paper survey for additional self-reported information  Yes  -CJ     Rehab Potential  Good  -CJ     Patient/caregiver participated in establishment of treatment plan and goals  Yes  -CJ     Patient would benefit from skilled therapy intervention  Yes  -CJ        PT Plan    PT Frequency  1x/week  -     Predicted Duration of Therapy Intervention (Therapy Eval)  4 weeks  -     Planned CPT's?  PT EVAL LOW COMPLEXITY: 31440;PT THER PROC EA 15 MIN: 58856;PT MANUAL THERAPY EA 15 MIN: 64468;PT HOT OR COLD PACK TREAT MCARE;PT ULTRASOUND EA 15 MIN: 63361  -     PT Plan Comments  follow up in two weeks to assess stretching compliance and pain  -       User Key  (r) = Recorded By, (t) = Taken By, (c) = Cosigned By    Initials Name Provider Type    Jett Bae S, PT Physical Therapist            OP Exercises     Row Name 11/26/19 0800             Total Minutes    24588 - PT Therapeutic Exercise Minutes  12  -         Exercise 1    Exercise Name 1  supine 90/90 HS stretch  -         Exercise 2    Exercise  Name 2  prone quad stretch  -         Exercise 3    Exercise Name 3  ITBand stretch  -         Exercise 4    Exercise Name 4  calf stretch  -         Exercise 5    Exercise Name 5  quad set  -         Exercise 6    Exercise Name 6  hip adduction isometric  -        User Key  (r) = Recorded By, (t) = Taken By, (c) = Cosigned By    Initials Name Provider Type     Jett Bocanegra, PT Physical Therapist                        Outcome Measure Options: Knee Outcome Score- ADL  Knee Outcome Score  Knee Outcome Score Comments: 72/80      Time Calculation:     Start Time: 0830  Stop Time: 0910  Time Calculation (min): 40 min  Total Timed Code Minutes- PT: 15 minute(s)     Therapy Charges for Today     Code Description Service Date Service Provider Modifiers Qty    84056590555  PT THER PROC EA 15 MIN 11/26/2019 Jett Bocanegra, PT GP 1    [id number]  PT EVAL LOW COMPLEXITY 2 11/26/2019 Jett Bocanegra, PT GP 1          PT G-Codes  Outcome Measure Options: Knee Outcome Score- ADL         Jett Bocanegra PT  11/26/2019

## 2019-12-12 ENCOUNTER — HOSPITAL ENCOUNTER (OUTPATIENT)
Dept: PHYSICAL THERAPY | Facility: HOSPITAL | Age: 54
Setting detail: THERAPIES SERIES
Discharge: HOME | End: 2019-12-12

## 2019-12-12 DIAGNOSIS — G89.29 CHRONIC PAIN OF LEFT KNEE: Primary | ICD-10-CM

## 2019-12-12 DIAGNOSIS — M25.562 CHRONIC PAIN OF LEFT KNEE: Primary | ICD-10-CM

## 2019-12-12 DIAGNOSIS — M76.32 IT BAND SYNDROME, LEFT: ICD-10-CM

## 2019-12-12 PROCEDURE — 97110 THERAPEUTIC EXERCISES: CPT

## 2019-12-12 NOTE — THERAPY TREATMENT NOTE
Outpatient Physical Therapy Ortho Treatment Note  [facility]     Patient Name: Felipa Stern  : 1965  MRN: [id number]  Today's Date: 2019      Visit Date: 2019    Visit Dx:    ICD-10-CM ICD-9-CM   1. Chronic pain of left knee M25.562 719.46    G89.29 338.29   2. It band syndrome, left M76.32 728.89       Patient Active Problem List   Diagnosis   • Hyperlipidemia   • Elevated blood sugar   • Allergic rhinitis   • Tinnitus   • Plantar fasciitis, bilateral   • Chronic pain of left knee   • Family history of heart disease   • Paresthesia   • Asymptomatic microscopic hematuria   • Essential hypertension   • Mass of leg, left   • Elevated glucose   • Right hand paresthesia        Past Medical History:   Diagnosis Date   • Allergic 1990    Mostly mild, intermittent.   • Allergic rhinitis    • Back pain    • Breast cyst    • Elevated blood sugar    • Essential hypertension 2018   • H/O mammogram    • Hyperlipidemia    • Tinnitus    • Visual impairment ?    Ocular pressure runs higer than normal.        Past Surgical History:   Procedure Laterality Date   • COLONOSCOPY N/A 2018    Procedure: COLONOSCOPY INTO CECUM;  Surgeon: Karel Merrill MD;  Location: [facility] ENDOSCOPY;  Service: General   • PAP SMEAR N/A 2012                       PT Assessment/Plan     Row Name 19 1200          PT Assessment    Assessment Comments  Ms. Stern returns for first follow up visit after initial eval with reports of decreaesd pain overall. Reviewed HEP and progress proximal hip strengthening with good tolerance. Progressed home program to include sidelying hip abduction, monster walks, and lateral sides as noted marked weakness L hip in sidelying hip abduction. Also modified stretching program to include pririformis stretch. Overall, pt with excellent tolerance and motivations for skilled PT.  -RS        PT Plan    PT Plan Comments  Follow up in 2 weeks regarding pain during typical daily  functions, tolerance for therex and HEP progression. Consider single leg activities.  -RS       User Key  (r) = Recorded By, (t) = Taken By, (c) = Cosigned By    Initials Name Provider Type    RS Mary Abernathy PT Physical Therapist            OP Exercises     Row Name 12/12/19 1100             Subjective Comments    Subjective Comments  Feeling better, only had one instance of a twinge of pain but it was short lived.  -RS         Subjective Pain    Able to rate subjective pain?  yes  -RS      Pre-Treatment Pain Level  0  -RS         Total Minutes    13321 - PT Therapeutic Exercise Minutes  40  -RS         Exercise 1    Exercise Name 1  supine 90/90 HS stretch  -RS      Cueing 1  Verbal  -RS      Reps 1  3  -RS      Time 1  20s  -RS         Exercise 2    Exercise Name 2  prone quad stretch  -RS      Cueing 2  Verbal  -RS      Reps 2  3  -RS      Time 2  20s  -RS         Exercise 3    Exercise Name 3  ITBand stretch  -RS      Cueing 3  Verbal  -RS      Reps 3  3  -RS      Time 3  20s  -RS         Exercise 4    Exercise Name 4  calf stretch  -RS      Cueing 4  Verbal  -RS      Reps 4  3  -RS      Time 4  20s  -RS         Exercise 5    Exercise Name 5  quad set+ SLR  -RS      Cueing 5  Verbal  -RS      Sets 5  2  -RS      Reps 5  10  -RS         Exercise 6    Exercise Name 6  hip adduction isometric plus bridge  -RS      Cueing 6  Verbal  -RS      Reps 6  10  -RS      Time 6  5s  -RS         Exercise 7    Exercise Name 7  supine piriformis stretch  -RS      Cueing 7  Verbal  -RS      Sets 7  3  -RS      Reps 7  20s  -RS         Exercise 8    Exercise Name 8  Nustep  -RS      Cueing 8  Verbal  -RS      Reps 8  3 min  -RS         Exercise 9    Exercise Name 9  sidelying hip abduction  -RS      Cueing 9  Verbal;Tactile  -RS      Sets 9  2  -RS      Reps 9  10  -RS      Additional Comments  significant weakness, requiures assist to maintain hipalignmement  -RS         Exercise 10    Exercise Name 10  side steps with  theraband  -RS      Cueing 10  Verbal  -RS      Reps 10  2 long laps  -RS      Additional Comments  RTB below knees  -RS         Exercise 11    Exercise Name 11  monster walks  -RS      Cueing 11  Demo  -RS      Reps 11  2 long laps  -RS      Additional Comments  RTB below knees  -RS        User Key  (r) = Recorded By, (t) = Taken By, (c) = Cosigned By    Initials Name Provider Type    RS Mary Abernathy PT Physical Therapist                       PT OP Goals     Row Name 12/12/19 1100          PT Short Term Goals    STG 1  Patient will be independent and compliant with initial home exercise program.  -RS     STG 1 Progress  Ongoing  -RS     STG 2  Patient will be independent with education for symptom management, posture, body mechanics, and strategies to minimize stress on affected tissues  -RS     STG 2 Progress  Ongoing  -RS        Long Term Goals    LTG 1  Patient will be independent and compliant with advanced home exercise program to facilitate self-management of symptoms once discharged from formal therapy   -RS     LTG 1 Progress  Ongoing  -RS     LTG 2  Pt. will report L knee pain </= 1-2/10 with all daily activities.  -RS     LTG 2 Progress  Ongoing  -RS       User Key  (r) = Recorded By, (t) = Taken By, (c) = Cosigned By    Initials Name Provider Type    RS Mary Abernathy PT Physical Therapist                         Time Calculation:   Start Time: 1128  Stop Time: 1214  Time Calculation (min): 46 min  Therapy Charges for Today     Code Description Service Date Service Provider Modifiers Qty    39506472374  PT THER PROC EA 15 MIN 12/12/2019 Mary Abernathy, PT GP 3                    Mary Abernathy PT  12/12/2019

## 2019-12-18 ENCOUNTER — TELEPHONE (OUTPATIENT)
Dept: INTERNAL MEDICINE | Facility: CLINIC | Age: 54
End: 2019-12-18

## 2019-12-26 ENCOUNTER — HOSPITAL ENCOUNTER (OUTPATIENT)
Dept: PHYSICAL THERAPY | Facility: HOSPITAL | Age: 54
Setting detail: THERAPIES SERIES
Discharge: HOME | End: 2019-12-26

## 2019-12-26 DIAGNOSIS — G89.29 CHRONIC PAIN OF LEFT KNEE: Primary | ICD-10-CM

## 2019-12-26 DIAGNOSIS — M25.562 CHRONIC PAIN OF LEFT KNEE: Primary | ICD-10-CM

## 2019-12-26 DIAGNOSIS — M76.32 IT BAND SYNDROME, LEFT: ICD-10-CM

## 2019-12-26 PROCEDURE — 97110 THERAPEUTIC EXERCISES: CPT

## 2019-12-26 NOTE — THERAPY DISCHARGE NOTE
Outpatient Physical Therapy Ortho Progress Note/Discharge Summary   [facility]     Patient Name: Felipa Stern  : 1965  MRN: [id number]  Today's Date: 2019      Visit Date: 2019    Visit Dx:    ICD-10-CM ICD-9-CM   1. Chronic pain of left knee M25.562 719.46    G89.29 338.29   2. It band syndrome, left M76.32 728.89       Patient Active Problem List   Diagnosis   • Hyperlipidemia   • Elevated blood sugar   • Allergic rhinitis   • Tinnitus   • Plantar fasciitis, bilateral   • Chronic pain of left knee   • Family history of heart disease   • Paresthesia   • Asymptomatic microscopic hematuria   • Essential hypertension   • Mass of leg, left   • Elevated glucose   • Right hand paresthesia        Past Medical History:   Diagnosis Date   • Allergic 1990    Mostly mild, intermittent.   • Allergic rhinitis    • Back pain    • Breast cyst    • Elevated blood sugar    • Essential hypertension 2018   • H/O mammogram    • Hyperlipidemia    • Tinnitus    • Visual impairment ?    Ocular pressure runs higer than normal.        Past Surgical History:   Procedure Laterality Date   • COLONOSCOPY N/A 2018    Procedure: COLONOSCOPY INTO CECUM;  Surgeon: Karel Merrill MD;  Location: [facility] ENDOSCOPY;  Service: General   • PAP SMEAR N/A 2012                               OP Exercises     Row Name 19 1000             Subjective Comments    Subjective Comments  Feeling good, got off floor from decorating without pain  -RS         Subjective Pain    Able to rate subjective pain?  yes  -RS      Post-Treatment Pain Level  0  -RS         Total Minutes    46089 - PT Therapeutic Exercise Minutes  40  -RS         Exercise 1    Exercise Name 1  supine 90/90 HS stretch  -RS      Cueing 1  Verbal  -RS      Reps 1  3  -RS      Time 1  20s  -RS         Exercise 2    Exercise Name 2  prone quad stretch  -RS      Cueing 2  Verbal  -RS      Reps 2  3  -RS      Time 2  20s  -RS         Exercise 3     Exercise Name 3  ITBand stretch  -RS      Cueing 3  Verbal  -RS      Reps 3  3  -RS      Time 3  20s  -RS         Exercise 4    Exercise Name 4  calf stretch  -RS      Cueing 4  Verbal  -RS      Reps 4  3  -RS      Time 4  20s  -RS         Exercise 5    Exercise Name 5  quad set+ SLR  -RS      Cueing 5  Verbal  -RS      Sets 5  2  -RS      Reps 5  10  -RS      Additional Comments  3#, second set with slight ER  -RS         Exercise 6    Exercise Name 6  hip adduction isometric plus bridge  -RS      Cueing 6  Verbal  -RS      Reps 6  10  -RS      Time 6  5s  -RS         Exercise 7    Exercise Name 7  supine piriformis stretch  -RS      Cueing 7  Verbal  -RS      Sets 7  3  -RS      Reps 7  20s  -RS         Exercise 8    Exercise Name 8  Nustep  -RS      Cueing 8  Verbal  -RS      Reps 8  5 min  -RS         Exercise 9    Exercise Name 9  sidelying hip abduction  -RS      Cueing 9  Verbal;Tactile  -RS      Sets 9  2  -RS      Reps 9  10  -RS      Additional Comments  no weight  -RS         Exercise 10    Exercise Name 10  side steps with theraband  -RS      Cueing 10  Verbal  -RS      Reps 10  2 long laps  -RS      Additional Comments  RTB below knees  -RS         Exercise 11    Exercise Name 11  monster walks  -RS      Cueing 11  Demo  -RS      Reps 11  2 long laps  -RS      Additional Comments  RTB below knees  -RS         Exercise 12    Exercise Name 12  SLS foam  -RS      Cueing 12  Verbal  -RS      Reps 12  2  -RS      Time 12  20s  -RS      Additional Comments  ea  -RS         Exercise 13    Exercise Name 13  mini squat with band  -RS      Cueing 13  Verbal;Demo  -RS      Reps 13  10  -RS      Additional Comments  RTB  -RS        User Key  (r) = Recorded By, (t) = Taken By, (c) = Cosigned By    Initials Name Provider Type    RS Mary Abernathy, PT Physical Therapist                         PT OP Goals     Row Name 12/26/19 1000          PT Short Term Goals    STG 1  Patient will be independent and compliant  with initial home exercise program.  -RS     STG 1 Progress  Met  -RS     STG 2  Patient will be independent with education for symptom management, posture, body mechanics, and strategies to minimize stress on affected tissues  -RS     STG 2 Progress  Met  -RS        Long Term Goals    LTG 1  Patient will be independent and compliant with advanced home exercise program to facilitate self-management of symptoms once discharged from formal therapy   -RS     LTG 1 Progress  Met  -RS     LTG 2  Pt. will report L knee pain </= 1-2/10 with all daily activities.  -RS     LTG 2 Progress  Met  -RS     LTG 2 Progress Comments  0/10 pain with all activities, has gotten off floor without pain  -RS       User Key  (r) = Recorded By, (t) = Taken By, (c) = Cosigned By    Initials Name Provider Type    Mary Edward PT Physical Therapist                         Time Calculation:   Start Time: 1045  Stop Time: 1128  Time Calculation (min): 43 min  Therapy Charges for Today     Code Description Service Date Service Provider Modifiers Qty    46705184419 HC PT THER PROC EA 15 MIN 12/26/2019 Mary Abernathy PT GP 3                OP PT Discharge Summary  Date of Discharge: 12/26/19  Reason for Discharge: All goals achieved  Outcomes Achieved: Able to achieve all goals within established timeline  Discharge Destination: Home with home program  Discharge Instructions/Additional Comments: The pt is discharged form skilled PT this date after meeting all functional goals. She reports no pain in the past 2 weeks and demonstrates good understanding of HEP. Provided updated HEP with exercise progressiong, pt receptive. The pt is appropriate for and agreeable to DC this date.       Mary Abernathy PT  12/26/2019

## 2020-03-11 ENCOUNTER — TELEPHONE (OUTPATIENT)
Dept: INTERNAL MEDICINE | Facility: CLINIC | Age: 55
End: 2020-03-11

## 2020-03-11 NOTE — TELEPHONE ENCOUNTER
PATIENT CALLING, HAS APPT ON Monday 3-. SHE WANTS TO KNOW IF SHE NEEDS TO RESCHEDULE DUE TO THE CORONAVIRUS OUTBREAK.    PLEASE CALL PATIENT BACK AND ADVISE -[phone].

## 2020-03-11 NOTE — TELEPHONE ENCOUNTER
Message left on v/m informing patient that she can make the decision on her own if she want to cancel. If she chooses too I advised her to call back to cancel or inform us through my chart.

## 2020-05-01 DIAGNOSIS — I10 ESSENTIAL HYPERTENSION: ICD-10-CM

## 2020-05-04 RX ORDER — LOSARTAN POTASSIUM 100 MG/1
TABLET ORAL
Qty: 30 TABLET | Refills: 4 | Status: SHIPPED | OUTPATIENT
Start: 2020-05-04 | End: 2020-10-20

## 2020-10-20 DIAGNOSIS — I10 ESSENTIAL HYPERTENSION: ICD-10-CM

## 2020-10-20 RX ORDER — LOSARTAN POTASSIUM 100 MG/1
TABLET ORAL
Qty: 30 TABLET | Refills: 2 | Status: SHIPPED | OUTPATIENT
Start: 2020-10-20 | End: 2021-02-08 | Stop reason: SDUPTHER

## 2021-01-31 DIAGNOSIS — I10 ESSENTIAL HYPERTENSION: ICD-10-CM

## 2021-02-01 RX ORDER — LOSARTAN POTASSIUM 100 MG/1
TABLET ORAL
Qty: 30 TABLET | Refills: 1 | OUTPATIENT
Start: 2021-02-01

## 2021-02-08 ENCOUNTER — OFFICE VISIT (OUTPATIENT)
Dept: INTERNAL MEDICINE | Facility: CLINIC | Age: 56
End: 2021-02-08

## 2021-02-08 VITALS
SYSTOLIC BLOOD PRESSURE: 148 MMHG | OXYGEN SATURATION: 98 % | BODY MASS INDEX: 32.44 KG/M2 | TEMPERATURE: 98 F | HEART RATE: 82 BPM | DIASTOLIC BLOOD PRESSURE: 88 MMHG | WEIGHT: 190 LBS | HEIGHT: 64 IN

## 2021-02-08 DIAGNOSIS — E78.49 OTHER HYPERLIPIDEMIA: ICD-10-CM

## 2021-02-08 DIAGNOSIS — R73.09 ELEVATED GLUCOSE: ICD-10-CM

## 2021-02-08 DIAGNOSIS — J30.2 SEASONAL ALLERGIC RHINITIS, UNSPECIFIED TRIGGER: ICD-10-CM

## 2021-02-08 DIAGNOSIS — Z12.11 COLON CANCER SCREENING: ICD-10-CM

## 2021-02-08 DIAGNOSIS — R73.9 ELEVATED BLOOD SUGAR: ICD-10-CM

## 2021-02-08 DIAGNOSIS — R53.83 FATIGUE, UNSPECIFIED TYPE: ICD-10-CM

## 2021-02-08 DIAGNOSIS — Z78.0 MENOPAUSE: ICD-10-CM

## 2021-02-08 DIAGNOSIS — Z00.00 ANNUAL PHYSICAL EXAM: Primary | ICD-10-CM

## 2021-02-08 DIAGNOSIS — Z12.31 ENCOUNTER FOR SCREENING MAMMOGRAM FOR BREAST CANCER: ICD-10-CM

## 2021-02-08 DIAGNOSIS — Z11.59 SCREENING FOR VIRAL DISEASE: ICD-10-CM

## 2021-02-08 DIAGNOSIS — I10 ESSENTIAL HYPERTENSION: ICD-10-CM

## 2021-02-08 DIAGNOSIS — K58.9 COLON SPASM: ICD-10-CM

## 2021-02-08 DIAGNOSIS — Z82.49 FAMILY HISTORY OF HEART DISEASE: ICD-10-CM

## 2021-02-08 DIAGNOSIS — J34.89 SINUS PRESSURE: ICD-10-CM

## 2021-02-08 LAB — HEMOCCULT STL QL IA: NEGATIVE

## 2021-02-08 PROCEDURE — 82274 ASSAY TEST FOR BLOOD FECAL: CPT | Performed by: INTERNAL MEDICINE

## 2021-02-08 PROCEDURE — 99396 PREV VISIT EST AGE 40-64: CPT | Performed by: INTERNAL MEDICINE

## 2021-02-08 RX ORDER — UBIDECARENONE 100 MG
100 CAPSULE ORAL DAILY
COMMUNITY

## 2021-02-08 RX ORDER — LOSARTAN POTASSIUM 100 MG/1
100 TABLET ORAL DAILY
Qty: 90 TABLET | Refills: 2 | Status: SHIPPED | OUTPATIENT
Start: 2021-02-08 | End: 2021-12-27

## 2021-02-08 NOTE — PATIENT INSTRUCTIONS
Health Maintenance, Female  Adopting a healthy lifestyle and getting preventive care are important in promoting health and wellness. Ask your health care provider about:  · The right schedule for you to have regular tests and exams.  · Things you can do on your own to prevent diseases and keep yourself healthy.  What should I know about diet, weight, and exercise?  Eat a healthy diet    · Eat a diet that includes plenty of vegetables, fruits, low-fat dairy products, and lean protein.  · Do not eat a lot of foods that are high in solid fats, added sugars, or sodium.  Maintain a healthy weight  Body mass index (BMI) is used to identify weight problems. It estimates body fat based on height and weight. Your health care provider can help determine your BMI and help you achieve or maintain a healthy weight.  Get regular exercise  Get regular exercise. This is one of the most important things you can do for your health. Most adults should:  · Exercise for at least 150 minutes each week. The exercise should increase your heart rate and make you sweat (moderate-intensity exercise).  · Do strengthening exercises at least twice a week. This is in addition to the moderate-intensity exercise.  · Spend less time sitting. Even light physical activity can be beneficial.  Watch cholesterol and blood lipids  Have your blood tested for lipids and cholesterol at 20 years of age, then have this test every 5 years.  Have your cholesterol levels checked more often if:  · Your lipid or cholesterol levels are high.  · You are older than 40 years of age.  · You are at high risk for heart disease.  What should I know about cancer screening?  Depending on your health history and family history, you may need to have cancer screening at various ages. This may include screening for:  · Breast cancer.  · Cervical cancer.  · Colorectal cancer.  · Skin cancer.  · Lung cancer.  What should I know about heart disease, diabetes, and high blood  pressure?  Blood pressure and heart disease  · High blood pressure causes heart disease and increases the risk of stroke. This is more likely to develop in people who have high blood pressure readings, are of  descent, or are overweight.  · Have your blood pressure checked:  ? Every 3-5 years if you are 18-39 years of age.  ? Every year if you are 40 years old or older.  Diabetes  Have regular diabetes screenings. This checks your fasting blood sugar level. Have the screening done:  · Once every three years after age 40 if you are at a normal weight and have a low risk for diabetes.  · More often and at a younger age if you are overweight or have a high risk for diabetes.  What should I know about preventing infection?  Hepatitis B  If you have a higher risk for hepatitis B, you should be screened for this virus. Talk with your health care provider to find out if you are at risk for hepatitis B infection.  Hepatitis C  Testing is recommended for:  · Everyone born from 1945 through 1965.  · Anyone with known risk factors for hepatitis C.  Sexually transmitted infections (STIs)  · Get screened for STIs, including gonorrhea and chlamydia, if:  ? You are sexually active and are younger than 24 years of age.  ? You are older than 24 years of age and your health care provider tells you that you are at risk for this type of infection.  ? Your sexual activity has changed since you were last screened, and you are at increased risk for chlamydia or gonorrhea. Ask your health care provider if you are at risk.  · Ask your health care provider about whether you are at high risk for HIV. Your health care provider may recommend a prescription medicine to help prevent HIV infection. If you choose to take medicine to prevent HIV, you should first get tested for HIV. You should then be tested every 3 months for as long as you are taking the medicine.  Pregnancy  · If you are about to stop having your period (premenopausal) and  you may become pregnant, seek counseling before you get pregnant.  · Take 400 to 800 micrograms (mcg) of folic acid every day if you become pregnant.  · Ask for birth control (contraception) if you want to prevent pregnancy.  Osteoporosis and menopause  Osteoporosis is a disease in which the bones lose minerals and strength with aging. This can result in bone fractures. If you are 65 years old or older, or if you are at risk for osteoporosis and fractures, ask your health care provider if you should:  · Be screened for bone loss.  · Take a calcium or vitamin D supplement to lower your risk of fractures.  · Be given hormone replacement therapy (HRT) to treat symptoms of menopause.  Follow these instructions at home:  Lifestyle  · Do not use any products that contain nicotine or tobacco, such as cigarettes, e-cigarettes, and chewing tobacco. If you need help quitting, ask your health care provider.  · Do not use street drugs.  · Do not share needles.  · Ask your health care provider for help if you need support or information about quitting drugs.  Alcohol use  · Do not drink alcohol if:  ? Your health care provider tells you not to drink.  ? You are pregnant, may be pregnant, or are planning to become pregnant.  · If you drink alcohol:  ? Limit how much you use to 0-1 drink a day.  ? Limit intake if you are breastfeeding.  · Be aware of how much alcohol is in your drink. In the [place] one drink equals one 12 oz bottle of beer (355 mL), one 5 oz glass of wine (148 mL), or one 1½ oz glass of hard liquor (44 mL).  General instructions  · Schedule regular health, dental, and eye exams.  · Stay current with your vaccines.  · Tell your health care provider if:  ? You often feel depressed.  ? You have ever been abused or do not feel safe at home.  Summary  · Adopting a healthy lifestyle and getting preventive care are important in promoting health and wellness.  · Follow your health care provider's instructions about healthy  diet, exercising, and getting tested or screened for diseases.  · Follow your health care provider's instructions on monitoring your cholesterol and blood pressure.  This information is not intended to replace advice given to you by your health care provider. Make sure you discuss any questions you have with your health care provider.  Document Revised: 12/11/2019 Document Reviewed: 12/11/2019  Elsevier Patient Education © 2020 [employer]      Mediterranean Diet  A Mediterranean diet refers to food and lifestyle choices that are based on the traditions of countries located on the [place]. This way of eating has been shown to help prevent certain conditions and improve outcomes for people who have chronic diseases, like kidney disease and heart disease.  What are tips for following this plan?  Lifestyle  · Cook and eat meals together with your family, when possible.  · Drink enough fluid to keep your urine clear or pale yellow.  · Be physically active every day. This includes:  ? Aerobic exercise like running or swimming.  ? Leisure activities like gardening, walking, or housework.  · Get 7-8 hours of sleep each night.  · If recommended by your health care provider, drink red wine in moderation. This means 1 glass a day for nonpregnant women and 2 glasses a day for men. A glass of wine equals 5 oz (150 mL).  Reading food labels    · Check the serving size of packaged foods. For foods such as rice and pasta, the serving size refers to the amount of cooked product, not dry.  · Check the total fat in packaged foods. Avoid foods that have saturated fat or trans fats.  · Check the ingredients list for added sugars, such as corn syrup.  Shopping  · At the grocery store, buy most of your food from the areas near the walls of the store. This includes:  ? Fresh fruits and vegetables (produce).  ? Grains, beans, nuts, and seeds. Some of these may be available in unpackaged forms or large amounts (in  bulk).  ? Fresh seafood.  ? Poultry and eggs.  ? Low-fat dairy products.  · Buy whole ingredients instead of prepackaged foods.  · Buy fresh fruits and vegetables in-season from local farmers markets.  · Buy frozen fruits and vegetables in resealable bags.  · If you do not have access to quality fresh seafood, buy precooked frozen shrimp or canned fish, such as tuna, salmon, or sardines.  · Buy small amounts of raw or cooked vegetables, salads, or olives from the deli or salad bar at your store.  · Stock your pantry so you always have certain foods on hand, such as olive oil, canned tuna, canned tomatoes, rice, pasta, and beans.  Cooking  · Cook foods with extra-virgin olive oil instead of using butter or other vegetable oils.  · Have meat as a side dish, and have vegetables or grains as your main dish. This means having meat in small portions or adding small amounts of meat to foods like pasta or stew.  · Use beans or vegetables instead of meat in common dishes like chili or lasagna.  · Tiburones with different cooking methods. Try roasting or broiling vegetables instead of steaming or sautéeing them.  · Add frozen vegetables to soups, stews, pasta, or rice.  · Add nuts or seeds for added healthy fat at each meal. You can add these to yogurt, salads, or vegetable dishes.  · Marinate fish or vegetables using olive oil, lemon juice, garlic, and fresh herbs.  Meal planning    · Plan to eat 1 vegetarian meal one day each week. Try to work up to 2 vegetarian meals, if possible.  · Eat seafood 2 or more times a week.  · Have healthy snacks readily available, such as:  ? Vegetable sticks with hummus.  ? Greek yogurt.  ? Fruit and nut trail mix.  · Eat balanced meals throughout the week. This includes:  ? Fruit: 2-3 servings a day  ? Vegetables: 4-5 servings a day  ? Low-fat dairy: 2 servings a day  ? Fish, poultry, or lean meat: 1 serving a day  ? Beans and legumes: 2 or more servings a week  ? Nuts and seeds: 1-2  servings a day  ? Whole grains: 6-8 servings a day  ? Extra-virgin olive oil: 3-4 servings a day  · Limit red meat and sweets to only a few servings a month  What are my food choices?  · Mediterranean diet  ? Recommended  § Grains: Whole-grain pasta. Brown rice. Bulgar wheat. Polenta. Couscous. Whole-wheat bread. Oatmeal. Quinoa.  § Vegetables: Artichokes. Beets. Broccoli. Cabbage. Carrots. Eggplant. Green beans. Chard. Kale. Spinach. Onions. Leeks. Peas. Squash. Tomatoes. Peppers. Radishes.  § Fruits: Apples. Apricots. Avocado. Berries. Bananas. Cherries. Dates. Figs. Grapes. Starr. Melon. Oranges. Peaches. Plums. Pomegranate.  § Meats and other protein foods: Beans. Almonds. Sunflower seeds. Pine nuts. Peanuts. Cod. [place]. Scallops. Shrimp. Tuna. Tilapia. Clams. Oysters. Eggs.  § Dairy: Low-fat milk. Cheese. Greek yogurt.  § Beverages: Water. Red wine. Herbal tea.  § Fats and oils: Extra virgin olive oil. Avocado oil. Grape seed oil.  § Sweets and desserts: Greek yogurt with honey. Baked apples. Poached pears. Trail mix.  § Seasoning and other foods: Basil. Cilantro. Coriander. Cumin. Mint. Parsley. Gerber. Rosemary. Tarragon. Garlic. Oregano. Thyme. Pepper. Balsalmic vinegar. Tahini. Hummus. Tomato sauce. Olives. Mushrooms.  ? Limit these  § Grains: Prepackaged pasta or rice dishes. Prepackaged cereal with added sugar.  § Vegetables: Deep fried potatoes (french fries).  § Fruits: Fruit canned in syrup.  § Meats and other protein foods: Beef. Pork. Lamb. Poultry with skin. Hot dogs. Amezcua.  § Dairy: Ice cream. Sour cream. Whole milk.  § Beverages: Juice. Sugar-sweetened soft drinks. Beer. Liquor and spirits.  § Fats and oils: Butter. Canola oil. Vegetable oil. Beef fat (tallow). Lard.  § Sweets and desserts: Cookies. Cakes. Pies. Candy.  § Seasoning and other foods: Mayonnaise. Premade sauces and marinades.  The items listed may not be a complete list. Talk with your dietitian about what dietary choices are right  for you.  Summary  · The Mediterranean diet includes both food and lifestyle choices.  · Eat a variety of fresh fruits and vegetables, beans, nuts, seeds, and whole grains.  · Limit the amount of red meat and sweets that you eat.  · Talk with your health care provider about whether it is safe for you to drink red wine in moderation. This means 1 glass a day for nonpregnant women and 2 glasses a day for men. A glass of wine equals 5 oz (150 mL).  This information is not intended to replace advice given to you by your health care provider. Make sure you discuss any questions you have with your health care provider.  Document Revised: 08/17/2017 Document Reviewed: 08/10/2017  Elsevier Patient Education © 2020 [employer] Inc[employer]

## 2021-02-08 NOTE — PROGRESS NOTES
"Chief Complaint  Annual Exam    Subjective          Felipa Stern presents to [facility] for   History of Present Illness    Review of Systems   Constitutional: Negative for appetite change, chills, fatigue, fever and unexpected weight change.   HENT: Positive for postnasal drip and sinus pressure. Negative for congestion, ear pain, mouth sores, sore throat, tinnitus, trouble swallowing and voice change.    Eyes: Negative for pain and visual disturbance.   Respiratory: Negative for cough, choking, shortness of breath and wheezing.    Cardiovascular: Negative for chest pain, palpitations and leg swelling.   Gastrointestinal: Negative for abdominal pain, blood in stool, constipation, diarrhea, nausea and vomiting.   Endocrine: Negative for cold intolerance, heat intolerance, polydipsia and polyuria.   Genitourinary: Negative for difficulty urinating, dysuria, enuresis, flank pain, frequency, hematuria, urgency and vaginal bleeding.   Musculoskeletal: Negative for arthralgias, back pain, gait problem, joint swelling, myalgias, neck pain and neck stiffness.   Skin: Negative for color change and rash.   Allergic/Immunologic: Positive for environmental allergies. Negative for food allergies and immunocompromised state.   Neurological: Negative for dizziness, tremors, seizures, syncope, speech difficulty, weakness and headaches.   Hematological: Negative for adenopathy. Does not bruise/bleed easily.   Psychiatric/Behavioral: Negative for agitation, confusion, decreased concentration, dysphoric mood, sleep disturbance and suicidal ideas. The patient is not nervous/anxious.         Objective   Vital Signs:   /88 (BP Location: Left arm, Patient Position: Sitting, Cuff Size: Adult)   Pulse 82   Temp 98 °F (36.7 °C)   Ht 161.3 cm (63.5\")   Wt 86.2 kg (190 lb)   SpO2 98%   BMI 33.13 kg/m²     Physical Exam  Vitals signs and nursing note reviewed.   Constitutional:       Appearance: " She is well-developed.   HENT:      Right Ear: Hearing, tympanic membrane, ear canal and external ear normal.      Left Ear: Hearing, tympanic membrane, ear canal and external ear normal.      Nose:      Right Sinus: No maxillary sinus tenderness or frontal sinus tenderness.      Left Sinus: No maxillary sinus tenderness or frontal sinus tenderness.   Eyes:      General: Lids are normal.      Conjunctiva/sclera: Conjunctivae normal.      Pupils: Pupils are equal, round, and reactive to light.   Neck:      Musculoskeletal: Neck supple.      Thyroid: No thyroid mass or thyromegaly.      Vascular: No carotid bruit or JVD.      Trachea: Trachea normal. No tracheal deviation.   Cardiovascular:      Rate and Rhythm: Normal rate and regular rhythm.      Pulses:           Carotid pulses are 2+ on the right side and 2+ on the left side.       Radial pulses are 2+ on the right side and 2+ on the left side.        Dorsalis pedis pulses are 2+ on the right side and 2+ on the left side.        Posterior tibial pulses are 2+ on the right side and 2+ on the left side.      Heart sounds: S1 normal and S2 normal. No murmur. No friction rub. No gallop.    Pulmonary:      Effort: Pulmonary effort is normal.      Breath sounds: Normal breath sounds.   Chest:      Chest wall: There is no dullness to percussion.      Breasts:         Right: No inverted nipple, mass, nipple discharge, skin change or tenderness.         Left: No inverted nipple, mass, nipple discharge, skin change or tenderness.   Abdominal:      General: Bowel sounds are normal. There is no abdominal bruit.      Palpations: Abdomen is soft.      Tenderness: There is no abdominal tenderness. There is no guarding or rebound.      Hernia: No hernia is present.   Musculoskeletal: Normal range of motion.   Lymphadenopathy:      Cervical: No cervical adenopathy.      Upper Body:      Right upper body: No supraclavicular adenopathy.      Left upper body: No supraclavicular  adenopathy.   Skin:     General: Skin is warm and dry.   Neurological:      Mental Status: She is alert.      Cranial Nerves: No cranial nerve deficit.      Sensory: No sensory deficit.      Deep Tendon Reflexes:      Reflex Scores:       Patellar reflexes are 2+ on the right side and 2+ on the left side.       Result Review :                 Assessment and Plan    Problem List Items Addressed This Visit        Unprioritized    Hyperlipidemia    Elevated blood sugar    Allergic rhinitis    Relevant Orders    Ambulatory Referral to ENT (Otolaryngology)    Family history of heart disease    Essential hypertension    Current Assessment & Plan     Hypertension is worsening.  Continue current treatment regimen.  Dietary sodium restriction.  Weight loss.  Regular aerobic exercise.  Blood pressure will be reassessed in 3 months.         Relevant Medications    losartan (COZAAR) 100 MG tablet    Elevated glucose    Current Assessment & Plan     Need low sugar/carb diet & daily exercise           Other Visit Diagnoses     Annual physical exam    -  Primary    Relevant Orders    CBC & Differential    Comprehensive Metabolic Panel    Lipid Panel    Urinalysis With Microscopic If Indicated (No Culture) - Urine, Clean Catch    Sinus pressure        Relevant Orders    Ambulatory Referral to ENT (Otolaryngology)    Fatigue, unspecified type        Relevant Orders    TSH Rfx On Abnormal To Free T4    Menopause        Relevant Orders    DEXA Bone Density Axial    Encounter for screening mammogram for breast cancer        Relevant Orders    Mammo Screening Bilateral With CAD    Screening for viral disease        Relevant Orders    Hepatitis C Antibody    Colon spasm        Colon cancer screening        Relevant Orders    POC FECAL OCCULT BLOOD BY IMMUNOASSAY (Completed)          Follow Up   Return in about 4 months (around 6/8/2021) for Recheck.  Patient was given instructions and counseling regarding her condition or for health  maintenance advice. Please see specific information pulled into the AVS if appropriate.       BP high b/c no losartan for several days - need to restart!! Call if bp over 130/80.      Discussed need to stay up to date on screening exams as indicated on sex, age & risk factors. I encouraged healthy weight maintenance with diet & exercise. Need good sleep habits & continue to avoid tobacco & excessive alcohol.

## 2021-02-19 ENCOUNTER — CLINICAL SUPPORT (OUTPATIENT)
Dept: INTERNAL MEDICINE | Facility: CLINIC | Age: 56
End: 2021-02-19

## 2021-02-19 ENCOUNTER — LAB (OUTPATIENT)
Dept: INTERNAL MEDICINE | Facility: CLINIC | Age: 56
End: 2021-02-19

## 2021-02-19 DIAGNOSIS — Z00.00 ANNUAL PHYSICAL EXAM: ICD-10-CM

## 2021-02-19 DIAGNOSIS — R53.83 FATIGUE, UNSPECIFIED TYPE: ICD-10-CM

## 2021-02-19 DIAGNOSIS — Z11.59 SCREENING FOR VIRAL DISEASE: ICD-10-CM

## 2021-02-19 DIAGNOSIS — Z78.0 MENOPAUSE: ICD-10-CM

## 2021-02-19 PROCEDURE — 77080 DXA BONE DENSITY AXIAL: CPT | Performed by: INTERNAL MEDICINE

## 2021-02-20 LAB
ALBUMIN SERPL-MCNC: 4 G/DL (ref 3.5–5.2)
ALBUMIN/GLOB SERPL: 1.3 G/DL
ALP SERPL-CCNC: 111 U/L (ref 39–117)
ALT SERPL-CCNC: 21 U/L (ref 1–33)
APPEARANCE UR: CLEAR
AST SERPL-CCNC: 21 U/L (ref 1–32)
BACTERIA #/AREA URNS HPF: ABNORMAL /HPF
BASOPHILS # BLD AUTO: 0.08 10*3/MM3 (ref 0–0.2)
BASOPHILS NFR BLD AUTO: 1 % (ref 0–1.5)
BILIRUB SERPL-MCNC: 0.3 MG/DL (ref 0–1.2)
BILIRUB UR QL STRIP: NEGATIVE
BUN SERPL-MCNC: 15 MG/DL (ref 6–20)
BUN/CREAT SERPL: 20.8 (ref 7–25)
CALCIUM SERPL-MCNC: 9.6 MG/DL (ref 8.6–10.5)
CASTS URNS MICRO: ABNORMAL
CHLORIDE SERPL-SCNC: 103 MMOL/L (ref 98–107)
CHOLEST SERPL-MCNC: 247 MG/DL (ref 0–200)
CO2 SERPL-SCNC: 25.7 MMOL/L (ref 22–29)
COLOR UR: YELLOW
CREAT SERPL-MCNC: 0.72 MG/DL (ref 0.57–1)
EOSINOPHIL # BLD AUTO: 0.23 10*3/MM3 (ref 0–0.4)
EOSINOPHIL NFR BLD AUTO: 3 % (ref 0.3–6.2)
EPI CELLS #/AREA URNS HPF: ABNORMAL /HPF
ERYTHROCYTE [DISTWIDTH] IN BLOOD BY AUTOMATED COUNT: 12.4 % (ref 12.3–15.4)
GLOBULIN SER CALC-MCNC: 3 GM/DL
GLUCOSE SERPL-MCNC: 102 MG/DL (ref 65–99)
GLUCOSE UR QL: NEGATIVE
HCT VFR BLD AUTO: 44.1 % (ref 34–46.6)
HCV AB S/CO SERPL IA: <0.1 S/CO RATIO (ref 0–0.9)
HDLC SERPL-MCNC: 60 MG/DL (ref 40–60)
HGB BLD-MCNC: 15 G/DL (ref 12–15.9)
HGB UR QL STRIP: NEGATIVE
IMM GRANULOCYTES # BLD AUTO: 0.03 10*3/MM3 (ref 0–0.05)
IMM GRANULOCYTES NFR BLD AUTO: 0.4 % (ref 0–0.5)
KETONES UR QL STRIP: NEGATIVE
LDLC SERPL CALC-MCNC: 172 MG/DL (ref 0–100)
LEUKOCYTE ESTERASE UR QL STRIP: ABNORMAL
LYMPHOCYTES # BLD AUTO: 2.38 10*3/MM3 (ref 0.7–3.1)
LYMPHOCYTES NFR BLD AUTO: 30.7 % (ref 19.6–45.3)
MCH RBC QN AUTO: 31.2 PG (ref 26.6–33)
MCHC RBC AUTO-ENTMCNC: 34 G/DL (ref 31.5–35.7)
MCV RBC AUTO: 91.7 FL (ref 79–97)
MONOCYTES # BLD AUTO: 0.51 10*3/MM3 (ref 0.1–0.9)
MONOCYTES NFR BLD AUTO: 6.6 % (ref 5–12)
NEUTROPHILS # BLD AUTO: 4.51 10*3/MM3 (ref 1.7–7)
NEUTROPHILS NFR BLD AUTO: 58.3 % (ref 42.7–76)
NITRITE UR QL STRIP: NEGATIVE
NRBC BLD AUTO-RTO: 0 /100 WBC (ref 0–0.2)
PH UR STRIP: 6 [PH] (ref 5–8)
PLATELET # BLD AUTO: 351 10*3/MM3 (ref 140–450)
POTASSIUM SERPL-SCNC: 4.5 MMOL/L (ref 3.5–5.2)
PROT SERPL-MCNC: 7 G/DL (ref 6–8.5)
PROT UR QL STRIP: NEGATIVE
RBC # BLD AUTO: 4.81 10*6/MM3 (ref 3.77–5.28)
RBC #/AREA URNS HPF: ABNORMAL /HPF
SODIUM SERPL-SCNC: 137 MMOL/L (ref 136–145)
SP GR UR: 1.02 (ref 1–1.03)
TRIGL SERPL-MCNC: 89 MG/DL (ref 0–150)
TSH SERPL DL<=0.005 MIU/L-ACNC: 1.8 UIU/ML (ref 0.27–4.2)
UROBILINOGEN UR STRIP-MCNC: ABNORMAL MG/DL
VLDLC SERPL CALC-MCNC: 15 MG/DL (ref 5–40)
WBC # BLD AUTO: 7.74 10*3/MM3 (ref 3.4–10.8)
WBC #/AREA URNS HPF: ABNORMAL /HPF

## 2021-02-22 NOTE — PROGRESS NOTES
High sugar/cholesterol/fat - need low fat/sugar diet & more exercise. Need to drink more water with next labs.

## 2021-03-30 ENCOUNTER — BULK ORDERING (OUTPATIENT)
Dept: CASE MANAGEMENT | Facility: OTHER | Age: 56
End: 2021-03-30

## 2021-03-30 DIAGNOSIS — Z23 IMMUNIZATION DUE: ICD-10-CM

## 2021-06-08 ENCOUNTER — OFFICE VISIT (OUTPATIENT)
Dept: INTERNAL MEDICINE | Facility: CLINIC | Age: 56
End: 2021-06-08

## 2021-06-08 ENCOUNTER — APPOINTMENT (OUTPATIENT)
Dept: WOMENS IMAGING | Facility: HOSPITAL | Age: 56
End: 2021-06-08

## 2021-06-08 VITALS
HEART RATE: 71 BPM | WEIGHT: 191 LBS | BODY MASS INDEX: 32.61 KG/M2 | HEIGHT: 64 IN | OXYGEN SATURATION: 96 % | DIASTOLIC BLOOD PRESSURE: 73 MMHG | TEMPERATURE: 98.7 F | RESPIRATION RATE: 18 BRPM | SYSTOLIC BLOOD PRESSURE: 129 MMHG

## 2021-06-08 DIAGNOSIS — Z12.31 ENCOUNTER FOR SCREENING MAMMOGRAM FOR BREAST CANCER: ICD-10-CM

## 2021-06-08 DIAGNOSIS — I10 ESSENTIAL HYPERTENSION: ICD-10-CM

## 2021-06-08 DIAGNOSIS — R73.9 ELEVATED BLOOD SUGAR: ICD-10-CM

## 2021-06-08 DIAGNOSIS — E78.49 OTHER HYPERLIPIDEMIA: Primary | ICD-10-CM

## 2021-06-08 PROCEDURE — 77067 SCR MAMMO BI INCL CAD: CPT | Performed by: INTERNAL MEDICINE

## 2021-06-08 PROCEDURE — 77067 SCR MAMMO BI INCL CAD: CPT | Performed by: RADIOLOGY

## 2021-06-08 PROCEDURE — 99213 OFFICE O/P EST LOW 20 MIN: CPT | Performed by: INTERNAL MEDICINE

## 2021-06-08 NOTE — PROGRESS NOTES
"Chief Complaint  Hypertension (4 month fu.) and Hyperlipidemia    Subjective          Felipa Stern presents to [facility] for   Hypertension  This is a chronic problem. The current episode started more than 1 year ago. The problem is unchanged. The problem is controlled.   Hyperlipidemia  This is a chronic problem. The current episode started more than 1 year ago. The problem is controlled. Recent lipid tests were reviewed and are normal.     Taking red yeast rice 2 caps daily.    Review of Systems     Objective   Vital Signs:   /73 (BP Location: Left arm, Patient Position: Sitting, Cuff Size: Adult)   Pulse 71   Temp 98.7 °F (37.1 °C)   Resp 18   Ht 161.3 cm (63.5\")   Wt 86.6 kg (191 lb)   SpO2 96%   BMI 33.30 kg/m²     Physical Exam  Vitals and nursing note reviewed.   Constitutional:       General: She is not in acute distress.     Appearance: She is well-developed.   Cardiovascular:      Rate and Rhythm: Normal rate and regular rhythm.      Heart sounds: Normal heart sounds.   Pulmonary:      Effort: No respiratory distress.      Breath sounds: No wheezing or rales.   Chest:      Chest wall: No tenderness.   Psychiatric:         Behavior: Behavior normal.        Result Review :                 Assessment and Plan    Problem List Items Addressed This Visit        Unprioritized    Hyperlipidemia - Primary    Current Assessment & Plan     Lipid abnormalities are unchanged.  Nutritional counseling was provided.  Lipids will be reassessed in 6 months.         Elevated blood sugar    Essential hypertension    Current Assessment & Plan     Hypertension is improving with treatment.  Continue current treatment regimen.  Dietary sodium restriction.  Weight loss.  Regular aerobic exercise.  Continue current medications.  Blood pressure will be reassessed at the next regular appointment.               Follow Up   Return in about 6 months (around 12/8/2021) for Recheck.  Patient " was given instructions and counseling regarding her condition or for health maintenance advice. Please see specific information pulled into the AVS if appropriate.

## 2021-11-02 ENCOUNTER — OFFICE VISIT (OUTPATIENT)
Dept: INTERNAL MEDICINE | Facility: CLINIC | Age: 56
End: 2021-11-02

## 2021-11-02 VITALS
TEMPERATURE: 98 F | BODY MASS INDEX: 31.76 KG/M2 | SYSTOLIC BLOOD PRESSURE: 118 MMHG | HEIGHT: 64 IN | OXYGEN SATURATION: 98 % | HEART RATE: 75 BPM | WEIGHT: 186 LBS | DIASTOLIC BLOOD PRESSURE: 76 MMHG

## 2021-11-02 DIAGNOSIS — J30.2 SEASONAL ALLERGIC RHINITIS, UNSPECIFIED TRIGGER: ICD-10-CM

## 2021-11-02 DIAGNOSIS — I10 ESSENTIAL HYPERTENSION: Primary | ICD-10-CM

## 2021-11-02 DIAGNOSIS — E78.49 OTHER HYPERLIPIDEMIA: ICD-10-CM

## 2021-11-02 DIAGNOSIS — R73.09 ELEVATED GLUCOSE: ICD-10-CM

## 2021-11-02 PROCEDURE — 99213 OFFICE O/P EST LOW 20 MIN: CPT | Performed by: INTERNAL MEDICINE

## 2021-11-02 NOTE — PROGRESS NOTES
"Chief Complaint  Hypertension (6 month follow up) and Hyperlipidemia    Subjective          Felipa Stern presents to [facility] for   Hypertension  This is a chronic problem. The current episode started more than 1 year ago. The problem is unchanged. The problem is controlled.   Hyperlipidemia  This is a chronic problem. The current episode started more than 1 year ago. The problem is controlled. Recent lipid tests were reviewed and are normal. She has no history of diabetes.       Review of Systems     Objective   Vital Signs:   /76 (BP Location: Left arm, Patient Position: Sitting, Cuff Size: Adult)   Pulse 75   Temp 98 °F (36.7 °C)   Ht 161.3 cm (63.5\")   Wt 84.4 kg (186 lb)   SpO2 98%   BMI 32.43 kg/m²     Physical Exam  Vitals and nursing note reviewed.   Constitutional:       General: She is not in acute distress.     Appearance: She is well-developed.   Cardiovascular:      Rate and Rhythm: Normal rate and regular rhythm.      Heart sounds: Normal heart sounds.   Pulmonary:      Effort: No respiratory distress.      Breath sounds: No wheezing or rales.   Chest:      Chest wall: No tenderness.   Psychiatric:         Behavior: Behavior normal.        Result Review :                 Assessment and Plan    Problem List Items Addressed This Visit        Unprioritized    Hyperlipidemia    Current Assessment & Plan     Lipid abnormalities are improving with treatment.  Nutritional counseling was provided.  Lipids will be reassessed in 3 months.         Relevant Orders    Comprehensive Metabolic Panel    Lipid Panel    Allergic rhinitis    Essential hypertension - Primary    Current Assessment & Plan     Hypertension is improving with treatment.  Continue current treatment regimen.  Dietary sodium restriction.  Weight loss.  Regular aerobic exercise.  Continue current medications.  Blood pressure will be reassessed at the next regular appointment.         Relevant Orders    " Comprehensive Metabolic Panel    Lipid Panel    Elevated glucose    Current Assessment & Plan     Need low sugar diet & daily exercise         Relevant Orders    Hemoglobin A1c          Follow Up   No follow-ups on file.  Patient was given instructions and counseling regarding her condition or for health maintenance advice. Please see specific information pulled into the AVS if appropriate.

## 2021-11-03 LAB
ALBUMIN SERPL-MCNC: 4.5 G/DL (ref 3.8–4.9)
ALBUMIN/GLOB SERPL: 1.3 {RATIO} (ref 1.2–2.2)
ALP SERPL-CCNC: 115 IU/L (ref 44–121)
ALT SERPL-CCNC: 23 IU/L (ref 0–32)
AST SERPL-CCNC: 20 IU/L (ref 0–40)
BILIRUB SERPL-MCNC: 0.4 MG/DL (ref 0–1.2)
BUN SERPL-MCNC: 16 MG/DL (ref 6–24)
BUN/CREAT SERPL: 21 (ref 9–23)
CALCIUM SERPL-MCNC: 9.7 MG/DL (ref 8.7–10.2)
CHLORIDE SERPL-SCNC: 102 MMOL/L (ref 96–106)
CHOLEST SERPL-MCNC: 247 MG/DL (ref 100–199)
CO2 SERPL-SCNC: 25 MMOL/L (ref 20–29)
CREAT SERPL-MCNC: 0.75 MG/DL (ref 0.57–1)
GLOBULIN SER CALC-MCNC: 3.4 G/DL (ref 1.5–4.5)
GLUCOSE SERPL-MCNC: 103 MG/DL (ref 65–99)
HBA1C MFR BLD: 6.2 % (ref 4.8–5.6)
HDLC SERPL-MCNC: 59 MG/DL
LDLC SERPL CALC-MCNC: 168 MG/DL (ref 0–99)
Lab: NORMAL
POTASSIUM SERPL-SCNC: 4.4 MMOL/L (ref 3.5–5.2)
PROT SERPL-MCNC: 7.9 G/DL (ref 6–8.5)
SODIUM SERPL-SCNC: 141 MMOL/L (ref 134–144)
TRIGL SERPL-MCNC: 112 MG/DL (ref 0–149)
VLDLC SERPL CALC-MCNC: 20 MG/DL (ref 5–40)

## 2021-11-05 NOTE — PROGRESS NOTES
High sugar/cholesterol/fat - need low fat/sugar diet & more exercise. WORSE!  You may start chol med & pre-diabetes medication now - or just recheck labs in 3-4 mos with dramatically different diet.

## 2021-12-27 DIAGNOSIS — I10 ESSENTIAL HYPERTENSION: ICD-10-CM

## 2021-12-27 RX ORDER — LOSARTAN POTASSIUM 100 MG/1
TABLET ORAL
Qty: 90 TABLET | Refills: 2 | Status: SHIPPED | OUTPATIENT
Start: 2021-12-27 | End: 2022-11-15 | Stop reason: SDUPTHER

## 2022-02-02 ENCOUNTER — OFFICE VISIT (OUTPATIENT)
Dept: INTERNAL MEDICINE | Facility: CLINIC | Age: 57
End: 2022-02-02

## 2022-02-02 VITALS
HEART RATE: 90 BPM | BODY MASS INDEX: 31.07 KG/M2 | TEMPERATURE: 97.7 F | DIASTOLIC BLOOD PRESSURE: 84 MMHG | WEIGHT: 182 LBS | HEIGHT: 64 IN | OXYGEN SATURATION: 98 % | SYSTOLIC BLOOD PRESSURE: 124 MMHG

## 2022-02-02 DIAGNOSIS — E78.49 OTHER HYPERLIPIDEMIA: ICD-10-CM

## 2022-02-02 DIAGNOSIS — I10 ESSENTIAL HYPERTENSION: Primary | ICD-10-CM

## 2022-02-02 PROCEDURE — 99213 OFFICE O/P EST LOW 20 MIN: CPT | Performed by: NURSE PRACTITIONER

## 2022-02-02 NOTE — PATIENT INSTRUCTIONS
Diabetes Mellitus and Nutrition, Adult  When you have diabetes, or diabetes mellitus, it is very important to have healthy eating habits because your blood sugar (glucose) levels are greatly affected by what you eat and drink. Eating healthy foods in the right amounts, at about the same times every day, can help you:  · Control your blood glucose.  · Lower your risk of heart disease.  · Improve your blood pressure.  · Reach or maintain a healthy weight.  What can affect my meal plan?  Every person with diabetes is different, and each person has different needs for a meal plan. Your health care provider may recommend that you work with a dietitian to make a meal plan that is best for you. Your meal plan may vary depending on factors such as:  · The calories you need.  · The medicines you take.  · Your weight.  · Your blood glucose, blood pressure, and cholesterol levels.  · Your activity level.  · Other health conditions you have, such as heart or kidney disease.  How do carbohydrates affect me?  Carbohydrates, also called carbs, affect your blood glucose level more than any other type of food. Eating carbs naturally raises the amount of glucose in your blood. Carb counting is a method for keeping track of how many carbs you eat. Counting carbs is important to keep your blood glucose at a healthy level, especially if you use insulin or take certain oral diabetes medicines.  It is important to know how many carbs you can safely have in each meal. This is different for every person. Your dietitian can help you calculate how many carbs you should have at each meal and for each snack.  How does alcohol affect me?  Alcohol can cause a sudden decrease in blood glucose (hypoglycemia), especially if you use insulin or take certain oral diabetes medicines. Hypoglycemia can be a life-threatening condition. Symptoms of hypoglycemia, such as sleepiness, dizziness, and confusion, are similar to symptoms of having too much  "alcohol.  · Do not drink alcohol if:  ? Your health care provider tells you not to drink.  ? You are pregnant, may be pregnant, or are planning to become pregnant.  · If you drink alcohol:  ? Do not drink on an empty stomach.  ? Limit how much you use to:  § 0-1 drink a day for women.  § 0-2 drinks a day for men.  ? Be aware of how much alcohol is in your drink. In the [place] one drink equals one 12 oz bottle of beer (355 mL), one 5 oz glass of wine (148 mL), or one 1½ oz glass of hard liquor (44 mL).  ? Keep yourself hydrated with water, diet soda, or unsweetened iced tea.  § Keep in mind that regular soda, juice, and other mixers may contain a lot of sugar and must be counted as carbs.  What are tips for following this plan?    Reading food labels  · Start by checking the serving size on the \"Nutrition Facts\" label of packaged foods and drinks. The amount of calories, carbs, fats, and other nutrients listed on the label is based on one serving of the item. Many items contain more than one serving per package.  · Check the total grams (g) of carbs in one serving. You can calculate the number of servings of carbs in one serving by dividing the total carbs by 15. For example, if a food has 30 g of total carbs per serving, it would be equal to 2 servings of carbs.  · Check the number of grams (g) of saturated fats and trans fats in one serving. Choose foods that have a low amount or none of these fats.  · Check the number of milligrams (mg) of salt (sodium) in one serving. Most people should limit total sodium intake to less than 2,300 mg per day.  · Always check the nutrition information of foods labeled as \"low-fat\" or \"nonfat.\" These foods may be higher in added sugar or refined carbs and should be avoided.  · Talk to your dietitian to identify your daily goals for nutrients listed on the label.  Shopping  · Avoid buying canned, pre-made, or processed foods. These foods tend to be high in fat, sodium, and added " sugar.  · Shop around the outside edge of the grocery store. This is where you will most often find fresh fruits and vegetables, bulk grains, fresh meats, and fresh dairy.  Cooking  · Use low-heat cooking methods, such as baking, instead of high-heat cooking methods like deep frying.  · Cook using healthy oils, such as olive, canola, or sunflower oil.  · Avoid cooking with butter, cream, or high-fat meats.  Meal planning  · Eat meals and snacks regularly, preferably at the same times every day. Avoid going long periods of time without eating.  · Eat foods that are high in fiber, such as fresh fruits, vegetables, beans, and whole grains. Talk with your dietitian about how many servings of carbs you can eat at each meal.  · Eat 4-6 oz (112-168 g) of lean protein each day, such as lean meat, chicken, fish, eggs, or tofu. One ounce (oz) of lean protein is equal to:  ? 1 oz (28 g) of meat, chicken, or fish.  ? 1 egg.  ? ¼ cup (62 g) of tofu.  · Eat some foods each day that contain healthy fats, such as avocado, nuts, seeds, and fish.  What foods should I eat?  Fruits  Berries. Apples. Oranges. Peaches. Apricots. Plums. Grapes. [place]ako. Papaya. Pomegranate. Kiwi. Cherries.  Vegetables  Lettuce. Spinach. Leafy greens, including kale, chard, dada greens, and mustard greens. Beets. Cauliflower. Cabbage. Broccoli. Carrots. Green beans. Tomatoes. Peppers. Onions. Cucumbers. Needles sprouts.  Grains  Whole grains, such as whole-wheat or whole-grain bread, crackers, tortillas, cereal, and pasta. Unsweetened oatmeal. Quinoa. Brown or wild rice.  Meats and other proteins  Seafood. Poultry without skin. Lean cuts of poultry and beef. Tofu. Nuts. Seeds.  Dairy  Low-fat or fat-free dairy products such as milk, yogurt, and cheese.  The items listed above may not be a complete list of foods and beverages you can eat. Contact a dietitian for more information.  What foods should I avoid?  Fruits  Fruits canned with  syrup.  Vegetables  Canned vegetables. Frozen vegetables with butter or cream sauce.  Grains  Refined white flour and flour products such as bread, pasta, snack foods, and cereals. Avoid all processed foods.  Meats and other proteins  Fatty cuts of meat. Poultry with skin. Breaded or fried meats. Processed meat. Avoid saturated fats.  Dairy  Full-fat yogurt, cheese, or milk.  Beverages  Sweetened drinks, such as soda or iced tea.  The items listed above may not be a complete list of foods and beverages you should avoid. Contact a dietitian for more information.  Questions to ask a health care provider  · Do I need to meet with a diabetes educator?  · Do I need to meet with a dietitian?  · What number can I call if I have questions?  · When are the best times to check my blood glucose?  Where to find more information:  · American Diabetes Association: diabetes.org  · Academy of Nutrition and Dietetics: www.eatright.org  · National [place] of Diabetes and Digestive and Kidney Diseases: www.niddk.nih.gov  · Association of Diabetes Care and Education Specialists: www.diabeteseducator.org  Summary  · It is important to have healthy eating habits because your blood sugar (glucose) levels are greatly affected by what you eat and drink.  · A healthy meal plan will help you control your blood glucose and maintain a healthy lifestyle.  · Your health care provider may recommend that you work with a dietitian to make a meal plan that is best for you.  · Keep in mind that carbohydrates (carbs) and alcohol have immediate effects on your blood glucose levels. It is important to count carbs and to use alcohol carefully.  This information is not intended to replace advice given to you by your health care provider. Make sure you discuss any questions you have with your health care provider.  Document Revised: 11/24/2020 Document Reviewed: 11/24/2020  Elsevier Patient Education © 2021 Else[employer] Inc.  High Cholesterol    High  "cholesterol is a condition in which the blood has high levels of a white, waxy substance similar to fat (cholesterol). The liver makes all the cholesterol that the body needs. The human body needs small amounts of cholesterol to help build cells. A person gets extra or excess cholesterol from the food that he or she eats.  The blood carries cholesterol from the liver to the rest of the body. If you have high cholesterol, deposits (plaques) may build up on the walls of your arteries. Arteries are the blood vessels that carry blood away from your heart. These plaques make the arteries narrow and stiff.  Cholesterol plaques increase your risk for heart attack and stroke. Work with your health care provider to keep your cholesterol levels in a healthy range.  What increases the risk?  The following factors may make you more likely to develop this condition:  · Eating foods that are high in animal fat (saturated fat) or cholesterol.  · Being overweight.  · Not getting enough exercise.  · A family history of high cholesterol (familial hypercholesterolemia).  · Use of tobacco products.  · Having diabetes.  What are the signs or symptoms?  There are no symptoms of this condition.  How is this diagnosed?  This condition may be diagnosed based on the results of a blood test.  · If you are older than 20 years of age, your health care provider may check your cholesterol levels every 4-6 years.  · You may be checked more often if you have high cholesterol or other risk factors for heart disease.  The blood test for cholesterol measures:  · \"Bad\" cholesterol, or LDL cholesterol. This is the main type of cholesterol that causes heart disease. The desired level is less than 100 mg/dL.  · \"Good\" cholesterol, or HDL cholesterol. HDL helps protect against heart disease by cleaning the arteries and carrying the LDL to the liver for processing. The desired level for HDL is 60 mg/dL or higher.  · Triglycerides. These are fats that your " body can store or burn for energy. The desired level is less than 150 mg/dL.  · Total cholesterol. This measures the total amount of cholesterol in your blood and includes LDL, HDL, and triglycerides. The desired level is less than 200 mg/dL.  How is this treated?  This condition may be treated with:  · Diet changes. You may be asked to eat foods that have more fiber and less saturated fats or added sugar.  · Lifestyle changes. These may include regular exercise, maintaining a healthy weight, and quitting use of tobacco products.  · Medicines. These are given when diet and lifestyle changes have not worked. You may be prescribed a statin medicine to help lower your cholesterol levels.  Follow these instructions at home:  Eating and drinking    · Eat a healthy, balanced diet. This diet includes:  ? Daily servings of a variety of fresh, frozen, or canned fruits and vegetables.  ? Daily servings of whole grain foods that are rich in fiber.  ? Foods that are low in saturated fats and trans fats. These include poultry and fish without skin, lean cuts of meat, and low-fat dairy products.  ? A variety of fish, especially oily fish that contain omega-3 fatty acids. Aim to eat fish at least 2 times a week.  · Avoid foods and drinks that have added sugar.  · Use healthy cooking methods, such as roasting, grilling, broiling, baking, poaching, steaming, and stir-frying. Do not espana your food except for stir-frying.    Lifestyle    · Get regular exercise. Aim to exercise for a total of 150 minutes a week. Increase your activity level by doing activities such as gardening, walking, and taking the stairs.  · Do not use any products that contain nicotine or tobacco, such as cigarettes, e-cigarettes, and chewing tobacco. If you need help quitting, ask your health care provider.    General instructions  · Take over-the-counter and prescription medicines only as told by your health care provider.  · Keep all follow-up visits as told by  "your health care provider. This is important.  Where to find more information  · American Heart Association: www.heart.org  · National Heart, Lung, and Blood [place]: www.nhlbi.nih.gov  Contact a health care provider if:  · You have trouble achieving or maintaining a healthy diet or weight.  · You are starting an exercise program.  · You are unable to stop smoking.  Get help right away if:  · You have chest pain.  · You have trouble breathing.  · You have any symptoms of a stroke. \"BE FAST\" is an easy way to remember the main warning signs of a stroke:  ? B - Balance. Signs are dizziness, sudden trouble walking, or loss of balance.  ? E - Eyes. Signs are trouble seeing or a sudden change in vision.  ? F - Face. Signs are sudden weakness or numbness of the face, or the face or eyelid drooping on one side.  ? A - Arms. Signs are weakness or numbness in an arm. This happens suddenly and usually on one side of the body.  ? S - Speech. Signs are sudden trouble speaking, slurred speech, or trouble understanding what people say.  ? T - Time. Time to call emergency services. Write down what time symptoms started.  · You have other signs of a stroke, such as:  ? A sudden, severe headache with no known cause.  ? Nausea or vomiting.  ? Seizure.  These symptoms may represent a serious problem that is an emergency. Do not wait to see if the symptoms will go away. Get medical help right away. Call your local emergency services (911 in the [place] Do not drive yourself to the hospital.  Summary  · Cholesterol plaques increase your risk for heart attack and stroke. Work with your health care provider to keep your cholesterol levels in a healthy range.  · Eat a healthy, balanced diet, get regular exercise, and maintain a healthy weight.  · Do not use any products that contain nicotine or tobacco, such as cigarettes, e-cigarettes, and chewing tobacco.  · Get help right away if you have any symptoms of a stroke.  This information is " not intended to replace advice given to you by your health care provider. Make sure you discuss any questions you have with your health care provider.  Document Revised: 11/16/2020 Document Reviewed: 11/16/2020  Elsevier Patient Education © 2021 [employer]  Mediterranean Diet  A Mediterranean diet refers to food and lifestyle choices that are based on the traditions of countries located on the [place]. This way of eating has been shown to help prevent certain conditions and improve outcomes for people who have chronic diseases, like kidney disease and heart disease.  What are tips for following this plan?  Lifestyle  · Cook and eat meals together with your family, when possible.  · Drink enough fluid to keep your urine clear or pale yellow.  · Be physically active every day. This includes:  ? Aerobic exercise like running or swimming.  ? Leisure activities like gardening, walking, or housework.  · Get 7-8 hours of sleep each night.  · If recommended by your health care provider, drink red wine in moderation. This means 1 glass a day for nonpregnant women and 2 glasses a day for men. A glass of wine equals 5 oz (150 mL).  Reading food labels    · Check the serving size of packaged foods. For foods such as rice and pasta, the serving size refers to the amount of cooked product, not dry.  · Check the total fat in packaged foods. Avoid foods that have saturated fat or trans fats.  · Check the ingredients list for added sugars, such as corn syrup.    Shopping  · At the grocery store, buy most of your food from the areas near the walls of the store. This includes:  ? Fresh fruits and vegetables (produce).  ? Grains, beans, nuts, and seeds. Some of these may be available in unpackaged forms or large amounts (in bulk).  ? Fresh seafood.  ? Poultry and eggs.  ? Low-fat dairy products.  · Buy whole ingredients instead of prepackaged foods.  · Buy fresh fruits and vegetables in-season from local farmers  markets.  · Buy frozen fruits and vegetables in resealable bags.  · If you do not have access to quality fresh seafood, buy precooked frozen shrimp or canned fish, such as tuna, salmon, or sardines.  · Buy small amounts of raw or cooked vegetables, salads, or olives from the deli or salad bar at your store.  · Stock your pantry so you always have certain foods on hand, such as olive oil, canned tuna, canned tomatoes, rice, pasta, and beans.  Cooking  · Cook foods with extra-virgin olive oil instead of using butter or other vegetable oils.  · Have meat as a side dish, and have vegetables or grains as your main dish. This means having meat in small portions or adding small amounts of meat to foods like pasta or stew.  · Use beans or vegetables instead of meat in common dishes like chili or lasagna.  · [place] with different cooking methods. Try roasting or broiling vegetables instead of steaming or sautéeing them.  · Add frozen vegetables to soups, stews, pasta, or rice.  · Add nuts or seeds for added healthy fat at each meal. You can add these to yogurt, salads, or vegetable dishes.  · Marinate fish or vegetables using olive oil, lemon juice, garlic, and fresh herbs.  Meal planning    · Plan to eat 1 vegetarian meal one day each week. Try to work up to 2 vegetarian meals, if possible.  · Eat seafood 2 or more times a week.  · Have healthy snacks readily available, such as:  ? Vegetable sticks with hummus.  ? Greek yogurt.  ? Fruit and nut trail mix.  · Eat balanced meals throughout the week. This includes:  ? Fruit: 2-3 servings a day  ? Vegetables: 4-5 servings a day  ? Low-fat dairy: 2 servings a day  ? Fish, poultry, or lean meat: 1 serving a day  ? Beans and legumes: 2 or more servings a week  ? Nuts and seeds: 1-2 servings a day  ? Whole grains: 6-8 servings a day  ? Extra-virgin olive oil: 3-4 servings a day  · Limit red meat and sweets to only a few servings a month    What are my food  choices?  · Mediterranean diet  ? Recommended  § Grains: Whole-grain pasta. Brown rice. Bulgar wheat. Polenta. Couscous. Whole-wheat bread. Oatmeal. Quinoa.  § Vegetables: Artichokes. Beets. Broccoli. Cabbage. Carrots. Eggplant. Green beans. Chard. Kale. Spinach. Onions. Leeks. Peas. Squash. Tomatoes. Peppers. Radishes.  § Fruits: Apples. Apricots. Avocado. Berries. Bananas. Cherries. Dates. Figs. Grapes. [place]. Melon. Oranges. Peaches. Plums. Pomegranate.  § Meats and other protein foods: Beans. Almonds. Sunflower seeds. Pine nuts. Peanuts. Cod. [place]. Scallops. Shrimp. Tuna. Tilapia. Clams. Oysters. Eggs.  § Dairy: Low-fat milk. Cheese. Greek yogurt.  § Beverages: Water. Red wine. Herbal tea.  § Fats and oils: Extra virgin olive oil. Avocado oil. Grape seed oil.  § Sweets and desserts: Greek yogurt with honey. Baked apples. Poached pears. Trail mix.  § Seasoning and other foods: Basil. Cilantro. Coriander. Cumin. Mint. Parsley. Gerber. Rosemary. Tarragon. Garlic. Oregano. Thyme. Pepper. Balsalmic vinegar. Tahini. Hummus. Tomato sauce. Olives. Mushrooms.  ? Limit these  § Grains: Prepackaged pasta or rice dishes. Prepackaged cereal with added sugar.  § Vegetables: Deep fried potatoes (french fries).  § Fruits: Fruit canned in syrup.  § Meats and other protein foods: Beef. Pork. Lamb. Poultry with skin. Hot dogs. [place].  § Dairy: Ice cream. Sour cream. Whole milk.  § Beverages: Juice. Sugar-sweetened soft drinks. Beer. Liquor and spirits.  § Fats and oils: Butter. Canola oil. Vegetable oil. Beef fat (tallow). Lard.  § Sweets and desserts: Cookies. Cakes. Pies. Candy.  § Seasoning and other foods: Mayonnaise. Premade sauces and marinades.  The items listed may not be a complete list. Talk with your dietitian about what dietary choices are right for you.  Summary  · The Mediterranean diet includes both food and lifestyle choices.  · Eat a variety of fresh fruits and vegetables, beans, nuts, seeds, and whole  grains.  · Limit the amount of red meat and sweets that you eat.  · Talk with your health care provider about whether it is safe for you to drink red wine in moderation. This means 1 glass a day for nonpregnant women and 2 glasses a day for men. A glass of wine equals 5 oz (150 mL).  This information is not intended to replace advice given to you by your health care provider. Make sure you discuss any questions you have with your health care provider.  Document Revised: 08/17/2017 Document Reviewed: 08/10/2017  Elsevier Patient Education © 2020 Elsevier Inc.

## 2022-02-02 NOTE — PROGRESS NOTES
"Chief Complaint  Establish Care (New Patient offboarding Dr Reddy)    Subjective          Felipa Stern presents to [facility]  History of Present Illness    Patient is a pleasant 56-year-old female who typically saw Dr. Reddy here in the office.  Patient is new to me and she is here today for an establish care visit.  Patient states overall she does very well except she feels that her hyperlipidemia at this time is uncontrolled.  She does take Red Yeast Rice in the am and pm for hyperlipidemia.  She has been on this medication for quite some time and she denies any side effects of the medication.  Patient does have a history of essential hypertension that she manages with diet and exercise.  She has no other complaints on today's office visit.  Objective   Vital Signs:   /84 (BP Location: Left arm, Patient Position: Sitting, Cuff Size: Adult)   Pulse 90   Temp 97.7 °F (36.5 °C)   Ht 161.3 cm (63.5\")   Wt 82.6 kg (182 lb)   SpO2 98%   BMI 31.73 kg/m²     Physical Exam  Vitals and nursing note reviewed.   Constitutional:       Appearance: She is obese.   HENT:      Head: Normocephalic.      Right Ear: Tympanic membrane, ear canal and external ear normal. There is no impacted cerumen.      Left Ear: Tympanic membrane, ear canal and external ear normal. There is no impacted cerumen.      Nose: Nose normal. No congestion or rhinorrhea.      Mouth/Throat:      Mouth: Mucous membranes are moist.      Pharynx: No oropharyngeal exudate.   Eyes:      Pupils: Pupils are equal, round, and reactive to light.   Cardiovascular:      Rate and Rhythm: Normal rate and regular rhythm.      Pulses: Normal pulses.      Heart sounds: Normal heart sounds.      Comments: No peripheral edema  Pulmonary:      Effort: Pulmonary effort is normal. No respiratory distress.      Breath sounds: Normal breath sounds. No stridor. No wheezing, rhonchi or rales.   Chest:      Chest wall: No tenderness. "   Musculoskeletal:         General: Normal range of motion.      Cervical back: Normal range of motion.   Skin:     General: Skin is warm.      Capillary Refill: Capillary refill takes less than 2 seconds.   Neurological:      Mental Status: She is alert and oriented to person, place, and time.   Psychiatric:         Behavior: Behavior normal.        Result Review :            Office Visit with Laura Reddy MD (11/02/2021)  Hemoglobin A1c (11/02/2021 12:00 AM)  Lipid Panel (11/02/2021 12:00 AM)  Comprehensive Metabolic Panel (11/02/2021 12:00 AM)       Assessment and Plan    Diagnoses and all orders for this visit:    1. Essential hypertension (Primary)    2. Other hyperlipidemia    I will see patient back in the office in 3 months time for her annual physical exam.  We spoke in depth about diet and exercise on today's office visit.  She will try to drink plenty of water throughout the day, add exercise at least 30 minutes at a time 3 times a week by walking.  She will also try to maintain more of a Mediterranean diet at this time.  If she has any questions or concerns she can contact me via [employer] message.      Follow Up   Return in about 3 months (around 5/2/2022) for Annual physical.  Patient was given instructions and counseling regarding her condition or for health maintenance advice. Please see specific information pulled into the AVS if appropriate.

## 2022-05-09 ENCOUNTER — OFFICE VISIT (OUTPATIENT)
Dept: INTERNAL MEDICINE | Facility: CLINIC | Age: 57
End: 2022-05-09

## 2022-05-09 VITALS
HEIGHT: 64 IN | SYSTOLIC BLOOD PRESSURE: 124 MMHG | OXYGEN SATURATION: 97 % | DIASTOLIC BLOOD PRESSURE: 80 MMHG | BODY MASS INDEX: 30.73 KG/M2 | TEMPERATURE: 97.3 F | WEIGHT: 180 LBS | HEART RATE: 75 BPM

## 2022-05-09 DIAGNOSIS — J30.2 SEASONAL ALLERGIC RHINITIS, UNSPECIFIED TRIGGER: Primary | ICD-10-CM

## 2022-05-09 DIAGNOSIS — E78.49 OTHER HYPERLIPIDEMIA: ICD-10-CM

## 2022-05-09 DIAGNOSIS — Z00.00 ANNUAL PHYSICAL EXAM: ICD-10-CM

## 2022-05-09 DIAGNOSIS — Z78.0 POSTMENOPAUSAL: ICD-10-CM

## 2022-05-09 DIAGNOSIS — I10 ESSENTIAL HYPERTENSION: ICD-10-CM

## 2022-05-09 DIAGNOSIS — R73.03 PREDIABETES: ICD-10-CM

## 2022-05-09 PROCEDURE — 99396 PREV VISIT EST AGE 40-64: CPT | Performed by: NURSE PRACTITIONER

## 2022-05-09 NOTE — PROGRESS NOTES
"Chief Complaint  Annual Exam (Physical)    Subjective          Felipa Stern presents to [facility] PRIMARY [facility]ARE  History of Present Illness    Patient is a pleasant 56 year old female who typically saw Dr. Reddy here in the office. She is new to me and she is here to establish care and for her annual PE.     Calories on an sangeetha on her phone.   Oatmeal fiber one and granola and she has changed to Non-dairy/plant based diet for the last 3 months.    Big Salad every night (vegan chicken patties) Sugar free whole wheat bread  One glass of red wine in the evening.     She will call about dental and vision screenings that are not UTD.     She grows her garden in the home and is loving it.     She has no concerns on today's office visit.     Objective   Vital Signs:  /80 (BP Location: Left arm, Patient Position: Sitting, Cuff Size: Adult)   Pulse 75   Temp 97.3 °F (36.3 °C)   Ht 161.3 cm (63.5\")   Wt 81.6 kg (180 lb)   SpO2 97%   BMI 31.39 kg/m²           Physical Exam  Vitals and nursing note reviewed.   Constitutional:       Appearance: Normal appearance.   HENT:      Head: Normocephalic.      Right Ear: Tympanic membrane, ear canal and external ear normal. There is no impacted cerumen.      Left Ear: Tympanic membrane, ear canal and external ear normal. There is no impacted cerumen.      Nose: Nose normal.      Mouth/Throat:      Mouth: Mucous membranes are moist.   Eyes:      Pupils: Pupils are equal, round, and reactive to light.   Cardiovascular:      Rate and Rhythm: Normal rate and regular rhythm.      Pulses: Normal pulses.      Heart sounds: Normal heart sounds.   Pulmonary:      Effort: Pulmonary effort is normal. No respiratory distress.      Breath sounds: Normal breath sounds. No stridor. No wheezing, rhonchi or rales.   Chest:      Chest wall: No tenderness.   Abdominal:      Comments: Normal bowel pattern she states.    Genitourinary:     Comments: \"Normal urinary " "pattern\"  Musculoskeletal:         General: Normal range of motion.      Cervical back: Normal range of motion.   Skin:     General: Skin is warm.      Capillary Refill: Capillary refill takes less than 2 seconds.   Neurological:      Mental Status: She is alert and oriented to person, place, and time.   Psychiatric:         Behavior: Behavior normal.        Result Review :            Office Visit with Laura Reddy MD (11/02/2021)  Comprehensive Metabolic Panel (11/02/2021 12:00 AM)  Lipid Panel (11/02/2021 12:00 AM)  Hemoglobin A1c (11/02/2021 12:00 AM)  Office Visit with Laura Reddy MD (02/08/2021)  Office Visit with Nicky Ayala APRN (02/02/2022)       Assessment and Plan    Diagnoses and all orders for this visit:    1. Seasonal allergic rhinitis, unspecified trigger (Primary)    2. Essential hypertension  -     CBC & Differential  -     Comprehensive Metabolic Panel  -     Lipid Panel    3. Other hyperlipidemia  -     Lipid Panel    4. Annual physical exam  -     CBC & Differential  -     Comprehensive Metabolic Panel  -     Hemoglobin A1c  -     Lipid Panel  -     TSH Rfx On Abnormal To Free T4    5. Prediabetes  -     Hemoglobin A1c    6. Postmenopausal  -     Mammo Screening Bilateral With CAD; Future      Patient is here for her annual physical exam.  She will continue her current low sugar low carbohydrate nondairy/plant-based diet at this time.  Patient will also start exercising at least 3 times a week with walking 30 minutes at a time.  Patient will also contact her eye doctor and her dentist to get in to be seen for her yearly screenings.  Labs will be done today we will contact her with those results.  Patient verbalizes understanding of treatment plan.  Patient's mammogram is due on a yearly basis due to last mammogram that was done 1 year ago.  We will contact her to set up her mammogram.  We will see patient in 6 months for recheck.       Follow Up   Return in about 6 months " (around 11/9/2022) for Recheck.  Patient was given instructions and counseling regarding her condition or for health maintenance advice. Please see specific information pulled into the AVS if appropriate.

## 2022-05-10 ENCOUNTER — TRANSCRIBE ORDERS (OUTPATIENT)
Dept: ADMINISTRATIVE | Facility: HOSPITAL | Age: 57
End: 2022-05-10

## 2022-05-10 DIAGNOSIS — Z12.31 SCREENING MAMMOGRAM FOR BREAST CANCER: Primary | ICD-10-CM

## 2022-05-10 LAB
ALBUMIN SERPL-MCNC: 4.2 G/DL (ref 3.8–4.9)
ALBUMIN/GLOB SERPL: 1.4 {RATIO} (ref 1.2–2.2)
ALP SERPL-CCNC: 116 IU/L (ref 44–121)
ALT SERPL-CCNC: 21 IU/L (ref 0–32)
AST SERPL-CCNC: 23 IU/L (ref 0–40)
BASOPHILS # BLD AUTO: 0 X10E3/UL (ref 0–0.2)
BASOPHILS NFR BLD AUTO: 1 %
BILIRUB SERPL-MCNC: 0.4 MG/DL (ref 0–1.2)
BUN SERPL-MCNC: 18 MG/DL (ref 6–24)
BUN/CREAT SERPL: 22 (ref 9–23)
CALCIUM SERPL-MCNC: 9.9 MG/DL (ref 8.7–10.2)
CHLORIDE SERPL-SCNC: 102 MMOL/L (ref 96–106)
CHOLEST SERPL-MCNC: 237 MG/DL (ref 100–199)
CO2 SERPL-SCNC: 22 MMOL/L (ref 20–29)
CREAT SERPL-MCNC: 0.83 MG/DL (ref 0.57–1)
EGFRCR SERPLBLD CKD-EPI 2021: 84 ML/MIN/1.73
EOSINOPHIL # BLD AUTO: 0.2 X10E3/UL (ref 0–0.4)
EOSINOPHIL NFR BLD AUTO: 3 %
ERYTHROCYTE [DISTWIDTH] IN BLOOD BY AUTOMATED COUNT: 13 % (ref 11.7–15.4)
GLOBULIN SER CALC-MCNC: 2.9 G/DL (ref 1.5–4.5)
GLUCOSE SERPL-MCNC: 106 MG/DL (ref 65–99)
HBA1C MFR BLD: 5.9 % (ref 4.8–5.6)
HCT VFR BLD AUTO: 44.7 % (ref 34–46.6)
HDLC SERPL-MCNC: 53 MG/DL
HGB BLD-MCNC: 14.8 G/DL (ref 11.1–15.9)
IMM GRANULOCYTES # BLD AUTO: 0 X10E3/UL (ref 0–0.1)
IMM GRANULOCYTES NFR BLD AUTO: 0 %
LDLC SERPL CALC-MCNC: 152 MG/DL (ref 0–99)
LYMPHOCYTES # BLD AUTO: 2.3 X10E3/UL (ref 0.7–3.1)
LYMPHOCYTES NFR BLD AUTO: 31 %
MCH RBC QN AUTO: 30.6 PG (ref 26.6–33)
MCHC RBC AUTO-ENTMCNC: 33.1 G/DL (ref 31.5–35.7)
MCV RBC AUTO: 92 FL (ref 79–97)
MONOCYTES # BLD AUTO: 0.5 X10E3/UL (ref 0.1–0.9)
MONOCYTES NFR BLD AUTO: 6 %
NEUTROPHILS # BLD AUTO: 4.4 X10E3/UL (ref 1.4–7)
NEUTROPHILS NFR BLD AUTO: 59 %
PLATELET # BLD AUTO: 381 X10E3/UL (ref 150–450)
POTASSIUM SERPL-SCNC: 4.7 MMOL/L (ref 3.5–5.2)
PROT SERPL-MCNC: 7.1 G/DL (ref 6–8.5)
RBC # BLD AUTO: 4.84 X10E6/UL (ref 3.77–5.28)
SODIUM SERPL-SCNC: 140 MMOL/L (ref 134–144)
TRIGL SERPL-MCNC: 178 MG/DL (ref 0–149)
TSH SERPL DL<=0.005 MIU/L-ACNC: 2.26 UIU/ML (ref 0.45–4.5)
VLDLC SERPL CALC-MCNC: 32 MG/DL (ref 5–40)
WBC # BLD AUTO: 7.4 X10E3/UL (ref 3.4–10.8)

## 2022-06-30 ENCOUNTER — TELEPHONE (OUTPATIENT)
Dept: INTERNAL MEDICINE | Facility: CLINIC | Age: 57
End: 2022-06-30

## 2022-07-05 RX ORDER — ATORVASTATIN CALCIUM 10 MG/1
10 TABLET, FILM COATED ORAL NIGHTLY
Qty: 90 TABLET | Refills: 0 | Status: SHIPPED | OUTPATIENT
Start: 2022-07-05 | End: 2022-10-03

## 2022-07-05 NOTE — TELEPHONE ENCOUNTER
I have placed the order for atorvastatin 10 mg for her to take at bedtime.   She can discontinue the red yeast rice at this time. Thank you, Nicky

## 2022-08-03 ENCOUNTER — HOSPITAL ENCOUNTER (OUTPATIENT)
Dept: MAMMOGRAPHY | Facility: HOSPITAL | Age: 57
Discharge: HOME | End: 2022-08-03
Admitting: NURSE PRACTITIONER

## 2022-08-03 DIAGNOSIS — Z12.31 SCREENING MAMMOGRAM FOR BREAST CANCER: ICD-10-CM

## 2022-08-03 PROCEDURE — 77063 BREAST TOMOSYNTHESIS BI: CPT

## 2022-08-03 PROCEDURE — 77067 SCR MAMMO BI INCL CAD: CPT

## 2022-08-17 ENCOUNTER — OFFICE VISIT (OUTPATIENT)
Dept: INTERNAL MEDICINE | Facility: CLINIC | Age: 57
End: 2022-08-17

## 2022-08-17 VITALS
WEIGHT: 180 LBS | HEIGHT: 63 IN | DIASTOLIC BLOOD PRESSURE: 88 MMHG | BODY MASS INDEX: 31.89 KG/M2 | HEART RATE: 89 BPM | OXYGEN SATURATION: 96 % | TEMPERATURE: 97.5 F | SYSTOLIC BLOOD PRESSURE: 142 MMHG

## 2022-08-17 DIAGNOSIS — R73.03 PREDIABETES: Chronic | ICD-10-CM

## 2022-08-17 DIAGNOSIS — H93.13 TINNITUS OF BOTH EARS: Chronic | ICD-10-CM

## 2022-08-17 DIAGNOSIS — Z00.00 ENCOUNTER FOR MEDICAL EXAMINATION TO ESTABLISH CARE: Primary | ICD-10-CM

## 2022-08-17 DIAGNOSIS — E78.49 OTHER HYPERLIPIDEMIA: Chronic | ICD-10-CM

## 2022-08-17 DIAGNOSIS — I10 ESSENTIAL HYPERTENSION: Chronic | ICD-10-CM

## 2022-08-17 DIAGNOSIS — L40.9 PSORIASIS: Chronic | ICD-10-CM

## 2022-08-17 PROBLEM — R73.09 ELEVATED GLUCOSE: Status: RESOLVED | Noted: 2019-05-07 | Resolved: 2022-08-17

## 2022-08-17 PROCEDURE — 99214 OFFICE O/P EST MOD 30 MIN: CPT | Performed by: NURSE PRACTITIONER

## 2022-08-17 RX ORDER — TURMERIC ROOT EXTRACT 500 MG
TABLET ORAL DAILY
COMMUNITY

## 2022-08-17 RX ORDER — DIPHENHYDRAMINE HCL 25 MG
25 CAPSULE ORAL NIGHTLY
COMMUNITY

## 2022-08-17 RX ORDER — FERROUS SULFATE 325(65) MG
325 TABLET ORAL
COMMUNITY

## 2022-08-17 NOTE — PROGRESS NOTES
"Chief Complaint  Establishing care/chronic health conditions    Subjective        Felipa Stern presents to [facility]  History of Present Illness  This is a 57 y/o female presenting to office for establishment of care and chronic care management. Patient is currently  and has no children. Patient currently works for [employer].     Patient reports no current exercise. Patient reports following healthy diet.     Patient reports having women's gynecological exam performed in office. Last pap smear completed in 2019. Patient is post menopausal. Mammogram 5/10/22-- negative.     HTN-- continues on losartan. Checks BP at home occasionally.     HLD-- continues on atorvastatin.     Patient reports some bruxism as well as ongoing tinnitus. Patient reports she has never had audiology assessment. Patient is agreeable to see ENT regarding this.     Patient reports hx of psoriasis x 10 years; does not use medication at this time. Reports psoriatic lesion to area above right eyebrow. Patient reports getting patches that come and go around ears and scalp region. Patient is agreeable to see dermatology.       Objective   Vital Signs:  /88 (BP Location: Left arm, Patient Position: Sitting, Cuff Size: Adult)   Pulse 89   Temp 97.5 °F (36.4 °C) (Tympanic)   Ht 160 cm (63\")   Wt 81.6 kg (180 lb)   SpO2 96%   BMI 31.89 kg/m²   Estimated body mass index is 31.89 kg/m² as calculated from the following:    Height as of this encounter: 160 cm (63\").    Weight as of this encounter: 81.6 kg (180 lb).          Physical Exam  Vitals and nursing note reviewed.   Constitutional:       Appearance: Normal appearance.   HENT:      Head: Normocephalic and atraumatic.   Eyes:      Pupils: Pupils are equal, round, and reactive to light.   Neck:      Vascular: No carotid bruit.   Cardiovascular:      Rate and Rhythm: Normal rate and regular rhythm.      Pulses: Normal pulses.      Heart sounds: Normal " heart sounds. No murmur heard.    No friction rub. No gallop.   Pulmonary:      Effort: Pulmonary effort is normal. No respiratory distress.      Breath sounds: Normal breath sounds. No stridor. No wheezing, rhonchi or rales.   Abdominal:      General: Bowel sounds are normal. There is no distension.      Palpations: Abdomen is soft.      Tenderness: There is no abdominal tenderness.   Musculoskeletal:         General: No swelling or deformity. Normal range of motion.      Cervical back: Normal range of motion and neck supple.   Skin:     General: Skin is warm and dry.      Coloration: Skin is not jaundiced.      Findings: Lesion present. No bruising.          Neurological:      General: No focal deficit present.      Mental Status: She is alert and oriented to person, place, and time. Mental status is at baseline.      Motor: No weakness.   Psychiatric:         Mood and Affect: Mood normal.         Thought Content: Thought content normal.         Judgment: Judgment normal.        Result Review :  The following data was reviewed by: CHANDNI Miller on 08/17/2022:  Common labs    Common Labsle 11/2/21 11/2/21 11/2/21 5/9/22 5/9/22 5/9/22 5/9/22    0000 0000 0000 0907 0907 0907 0907   Glucose 103 (A)    106 (A)     BUN 16    18     Creatinine 0.75    0.83     eGFR Non  Am 91         eGFR African Am 105         Sodium 141    140     Potassium 4.4    4.7     Chloride 102    102     Calcium 9.7    9.9     Total Protein 7.9    7.1     Albumin 4.5    4.2     Total Bilirubin 0.4    0.4     Alkaline Phosphatase 115    116     AST (SGOT) 20    23     ALT (SGPT) 23    21     WBC    7.4      Hemoglobin    14.8      Hematocrit    44.7      Platelets    381      Total Cholesterol  247 (A)     237 (A)   Triglycerides  112     178 (A)   HDL Cholesterol  59     53   LDL Cholesterol   168 (A)     152 (A)   Hemoglobin A1C   6.2 (A)   5.9 (A)    (A) Abnormal value       Comments are available for some flowsheets but are not  being displayed.                  Assessment and Plan   Diagnoses and all orders for this visit:    1. Encounter for medical examination to establish care (Primary)    2. Other hyperlipidemia  Assessment & Plan:  Lipid abnormalities are unchanged.  Nutritional counseling was provided. and Pharmacotherapy as ordered.  Lipids will be reassessed in 3 months.      3. Prediabetes  Assessment & Plan:  Recommended low glycemic diet and exercise.       4. Essential hypertension  Assessment & Plan:  Hypertension is improving with treatment.  Continue current treatment regimen.  Dietary sodium restriction.  Weight loss.  Regular aerobic exercise.  Continue current medications.  Blood pressure will be reassessed at the next regular appointment.      5. Tinnitus of both ears  -     Ambulatory Referral to ENT (Otolaryngology)    6. Psoriasis  Assessment & Plan:  Referral to dermatology.     Orders:  -     Ambulatory Referral to Dermatology           Follow Up   Return in about 4 months (around 12/17/2022) for Women's exam in 4 months with fasting labs.  Patient was given instructions and counseling regarding her condition or for health maintenance advice. Please see specific information pulled into the AVS if appropriate.

## 2022-10-21 ENCOUNTER — TELEPHONE (OUTPATIENT)
Dept: INTERNAL MEDICINE | Facility: CLINIC | Age: 57
End: 2022-10-21

## 2022-10-21 NOTE — TELEPHONE ENCOUNTER
Caller: Felipa Stern    Relationship: Self    Best call back number: [phone]    Requested Prescriptions:   Requested Prescriptions      No prescriptions requested or ordered in this encounter        ATORVASTATIN (NOT ON MED LIST)    Pharmacy where request should be sent: [facility] [id number] [facility] [address] [place] [address] RD AT [facility] - [phone]  - [phone] FX     Additional details provided by patient: PATIENT RAN OUT OF THE MEDICATION TWO WEEKS AGO. PLEASE ADVISE.    Does the patient have less than a 3 day supply:  [x] Yes  [] No    Thee Ornelas Rep   10/21/22 12:47 EDT

## 2022-10-24 RX ORDER — ATORVASTATIN CALCIUM 10 MG/1
10 TABLET, FILM COATED ORAL DAILY
Qty: 90 TABLET | Refills: 1 | Status: SHIPPED | OUTPATIENT
Start: 2022-10-24

## 2022-11-09 ENCOUNTER — TELEPHONE (OUTPATIENT)
Dept: INTERNAL MEDICINE | Facility: CLINIC | Age: 57
End: 2022-11-09

## 2022-11-09 DIAGNOSIS — I10 ESSENTIAL HYPERTENSION: ICD-10-CM

## 2022-11-09 RX ORDER — LOSARTAN POTASSIUM 100 MG/1
100 TABLET ORAL DAILY
Qty: 90 TABLET | Refills: 2 | Status: CANCELLED | OUTPATIENT
Start: 2022-11-09

## 2022-11-09 NOTE — TELEPHONE ENCOUNTER
Caller: Felipa Stern    Relationship: Self    Best call back number: [phone]    Requested Prescriptions:   Requested Prescriptions     Pending Prescriptions Disp Refills   • losartan (COZAAR) 100 MG tablet 90 tablet 2     Sig: Take 1 tablet by mouth Daily.        Pharmacy where request should be sent: [facility] [id number] [address] AT [facility] [phone] [facility] [phone] FX     Additional details provided by patient: PATIENT HAS TRIED TO GET THIS REFILLED SINCE 10/31, [facility] ADVISED THAT THE OFFICE HADN'T RESPONDED.  Does the patient have less than a 3 day supply:  [x] Yes  [] No    Thee Peterson Rep   11/09/22 13:05 EST

## 2022-11-15 DIAGNOSIS — I10 ESSENTIAL HYPERTENSION: ICD-10-CM

## 2022-11-15 RX ORDER — LOSARTAN POTASSIUM 100 MG/1
100 TABLET ORAL DAILY
Qty: 90 TABLET | Refills: 1 | Status: SHIPPED | OUTPATIENT
Start: 2022-11-15

## 2022-12-20 ENCOUNTER — OFFICE VISIT (OUTPATIENT)
Dept: INTERNAL MEDICINE | Facility: CLINIC | Age: 57
End: 2022-12-20

## 2022-12-20 VITALS
OXYGEN SATURATION: 97 % | BODY MASS INDEX: 31.61 KG/M2 | WEIGHT: 178.4 LBS | HEIGHT: 63 IN | DIASTOLIC BLOOD PRESSURE: 74 MMHG | HEART RATE: 77 BPM | TEMPERATURE: 98 F | SYSTOLIC BLOOD PRESSURE: 112 MMHG

## 2022-12-20 DIAGNOSIS — Z01.419 WOMEN'S ANNUAL ROUTINE GYNECOLOGICAL EXAMINATION: Primary | ICD-10-CM

## 2022-12-20 DIAGNOSIS — Z01.419 WOMEN'S ANNUAL ROUTINE GYNECOLOGICAL EXAMINATION: ICD-10-CM

## 2022-12-20 DIAGNOSIS — E78.49 OTHER HYPERLIPIDEMIA: ICD-10-CM

## 2022-12-20 LAB
ALBUMIN SERPL-MCNC: 4.1 G/DL (ref 3.5–5.2)
ALBUMIN/GLOB SERPL: 1.4 G/DL
ALP SERPL-CCNC: 97 U/L (ref 39–117)
ALT SERPL-CCNC: 15 U/L (ref 1–33)
AST SERPL-CCNC: 18 U/L (ref 1–32)
BASOPHILS # BLD AUTO: 0.04 10*3/MM3 (ref 0–0.2)
BASOPHILS NFR BLD AUTO: 0.6 % (ref 0–1.5)
BILIRUB SERPL-MCNC: 0.4 MG/DL (ref 0–1.2)
BUN SERPL-MCNC: 18 MG/DL (ref 6–20)
BUN/CREAT SERPL: 22.2 (ref 7–25)
CALCIUM SERPL-MCNC: 9.5 MG/DL (ref 8.6–10.5)
CHLORIDE SERPL-SCNC: 103 MMOL/L (ref 98–107)
CHOLEST SERPL-MCNC: 179 MG/DL (ref 0–200)
CO2 SERPL-SCNC: 28.6 MMOL/L (ref 22–29)
CREAT SERPL-MCNC: 0.81 MG/DL (ref 0.57–1)
EGFRCR SERPLBLD CKD-EPI 2021: 85.9 ML/MIN/1.73
EOSINOPHIL # BLD AUTO: 0.17 10*3/MM3 (ref 0–0.4)
EOSINOPHIL NFR BLD AUTO: 2.7 % (ref 0.3–6.2)
ERYTHROCYTE [DISTWIDTH] IN BLOOD BY AUTOMATED COUNT: 12.4 % (ref 12.3–15.4)
GLOBULIN SER CALC-MCNC: 3 GM/DL
GLUCOSE SERPL-MCNC: 114 MG/DL (ref 65–99)
HBA1C MFR BLD: 6 % (ref 4.8–5.6)
HCT VFR BLD AUTO: 42.3 % (ref 34–46.6)
HDLC SERPL-MCNC: 57 MG/DL (ref 40–60)
HGB BLD-MCNC: 14.6 G/DL (ref 12–15.9)
IMM GRANULOCYTES # BLD AUTO: 0.02 10*3/MM3 (ref 0–0.05)
IMM GRANULOCYTES NFR BLD AUTO: 0.3 % (ref 0–0.5)
LDLC SERPL CALC-MCNC: 106 MG/DL (ref 0–100)
LYMPHOCYTES # BLD AUTO: 2.34 10*3/MM3 (ref 0.7–3.1)
LYMPHOCYTES NFR BLD AUTO: 37.6 % (ref 19.6–45.3)
MCH RBC QN AUTO: 31.2 PG (ref 26.6–33)
MCHC RBC AUTO-ENTMCNC: 34.5 G/DL (ref 31.5–35.7)
MCV RBC AUTO: 90.4 FL (ref 79–97)
MONOCYTES # BLD AUTO: 0.48 10*3/MM3 (ref 0.1–0.9)
MONOCYTES NFR BLD AUTO: 7.7 % (ref 5–12)
NEUTROPHILS # BLD AUTO: 3.17 10*3/MM3 (ref 1.7–7)
NEUTROPHILS NFR BLD AUTO: 51.1 % (ref 42.7–76)
NRBC BLD AUTO-RTO: 0 /100 WBC (ref 0–0.2)
PLATELET # BLD AUTO: 350 10*3/MM3 (ref 140–450)
POTASSIUM SERPL-SCNC: 4.7 MMOL/L (ref 3.5–5.2)
PROT SERPL-MCNC: 7.1 G/DL (ref 6–8.5)
RBC # BLD AUTO: 4.68 10*6/MM3 (ref 3.77–5.28)
SODIUM SERPL-SCNC: 139 MMOL/L (ref 136–145)
TRIGL SERPL-MCNC: 90 MG/DL (ref 0–150)
TSH SERPL DL<=0.005 MIU/L-ACNC: 2.03 UIU/ML (ref 0.27–4.2)
VLDLC SERPL CALC-MCNC: 16 MG/DL (ref 5–40)
WBC # BLD AUTO: 6.22 10*3/MM3 (ref 3.4–10.8)

## 2022-12-20 PROCEDURE — 99396 PREV VISIT EST AGE 40-64: CPT | Performed by: NURSE PRACTITIONER

## 2022-12-20 NOTE — ASSESSMENT & PLAN NOTE
Lipid abnormalities are unchanged.  Nutritional counseling was provided. and Pharmacotherapy as ordered.  Lipids will be reassessed in 6 months.  Lab today  Cont on statin

## 2022-12-20 NOTE — PROGRESS NOTES
"Chief Complaint  Med Refill, Annual Exam, and Gynecologic Exam    Subjective        Felipa Stern presents to [facility] PR[facility]  History of Present Illness  This is a 58 y/o female presenting to office for gynecological exam and pap smear.     Patient is currently post menopausal. Patient denies any hx of abnormal pap smears. Mammogram UTD. Not currently sexually active.     HLD-- tolerating statin. Continues with diet and exercise.       Objective   Vital Signs:  /74 (BP Location: Left arm, Patient Position: Sitting, Cuff Size: Adult)   Pulse 77   Temp 98 °F (36.7 °C) (Infrared)   Ht 160 cm (63\")   Wt 80.9 kg (178 lb 6.4 oz)   SpO2 97%   BMI 31.60 kg/m²   Estimated body mass index is 31.6 kg/m² as calculated from the following:    Height as of this encounter: 160 cm (63\").    Weight as of this encounter: 80.9 kg (178 lb 6.4 oz).          Physical Exam  Constitutional:       Appearance: Normal appearance.   HENT:      Head: Normocephalic and atraumatic.      Right Ear: External ear normal.      Left Ear: External ear normal.   Eyes:      Pupils: Pupils are equal, round, and reactive to light.      Comments: +glasses   Cardiovascular:      Rate and Rhythm: Normal rate and regular rhythm.      Pulses: Normal pulses.      Heart sounds: Normal heart sounds. No murmur heard.    No gallop.   Pulmonary:      Effort: Pulmonary effort is normal. No respiratory distress.      Breath sounds: Normal breath sounds. No wheezing or rales.   Genitourinary:     Vagina: Normal. No signs of injury. No vaginal discharge or tenderness.      Cervix: No lesion or erythema.      Uterus: Normal.       Adnexa: Right adnexa normal and left adnexa normal.      Rectum: Normal.   Musculoskeletal:      Cervical back: Normal range of motion and neck supple.   Skin:     General: Skin is warm and dry.   Neurological:      General: No focal deficit present.      Mental Status: She is alert and oriented to person, " place, and time. Mental status is at baseline.   Psychiatric:         Mood and Affect: Mood normal.         Thought Content: Thought content normal.        Result Review :  The following data was reviewed by: CHANDNI Miller on 12/20/2022:  Common labs    Common Labs 5/9/22 5/9/22 5/9/22 5/9/22    0907 0907 0907 0907   Glucose  106 (A)     BUN  18     Creatinine  0.83     Sodium  140     Potassium  4.7     Chloride  102     Calcium  9.9     Total Protein  7.1     Albumin  4.2     Total Bilirubin  0.4     Alkaline Phosphatase  116     AST (SGOT)  23     ALT (SGPT)  21     WBC 7.4      Hemoglobin 14.8      Hematocrit 44.7      Platelets 381      Total Cholesterol    237 (A)   Triglycerides    178 (A)   HDL Cholesterol    53   LDL Cholesterol     152 (A)   Hemoglobin A1C   5.9 (A)    (A) Abnormal value       Comments are available for some flowsheets but are not being displayed.                     Assessment and Plan   Diagnoses and all orders for this visit:    1. Women's annual routine gynecological examination (Primary)  Assessment & Plan:  Not currently sexually active.   Recommended pap f/u in 3 years as long as results are normal.       Orders:  -     Hemoglobin A1c  -     CBC & Differential  -     TSH Rfx On Abnormal To Free T4  -     Comprehensive metabolic panel  -     IGP, Aptima HPV, Rfx 16 / 18,45; Future    2. Other hyperlipidemia  Assessment & Plan:  Lipid abnormalities are unchanged.  Nutritional counseling was provided. and Pharmacotherapy as ordered.  Lipids will be reassessed in 6 months.  Lab today  Cont on statin    Orders:  -     Lipid panel           Follow Up   Return in about 6 months (around 6/20/2023).  Patient was given instructions and counseling regarding her condition or for health maintenance advice. Please see specific information pulled into the AVS if appropriate.

## 2022-12-30 LAB
CYTOLOGIST CVX/VAG CYTO: NORMAL
CYTOLOGY CVX/VAG DOC CYTO: NORMAL
CYTOLOGY CVX/VAG DOC THIN PREP: NORMAL
DX ICD CODE: NORMAL
HIV 1 & 2 AB SER-IMP: NORMAL
HPV GENOTYPE REFLEX: NORMAL
HPV I/H RISK 4 DNA CVX QL PROBE+SIG AMP: NEGATIVE
Lab: NORMAL
OTHER STN SPEC: NORMAL
STAT OF ADQ CVX/VAG CYTO-IMP: NORMAL

## 2023-03-22 ENCOUNTER — HOSPITAL ENCOUNTER (OUTPATIENT)
Dept: GENERAL RADIOLOGY | Facility: HOSPITAL | Age: 58
Discharge: HOME | End: 2023-03-22
Payer: COMMERCIAL

## 2023-03-22 ENCOUNTER — OFFICE VISIT (OUTPATIENT)
Dept: INTERNAL MEDICINE | Facility: CLINIC | Age: 58
End: 2023-03-22
Payer: COMMERCIAL

## 2023-03-22 VITALS
DIASTOLIC BLOOD PRESSURE: 82 MMHG | TEMPERATURE: 97.7 F | BODY MASS INDEX: 32.78 KG/M2 | WEIGHT: 185 LBS | HEART RATE: 78 BPM | HEIGHT: 63 IN | SYSTOLIC BLOOD PRESSURE: 135 MMHG | OXYGEN SATURATION: 95 %

## 2023-03-22 DIAGNOSIS — S92.505A CLOSED NONDISPLACED FRACTURE OF PHALANX OF LESSER TOE OF LEFT FOOT, UNSPECIFIED PHALANX, INITIAL ENCOUNTER: Primary | ICD-10-CM

## 2023-03-22 PROCEDURE — 73630 X-RAY EXAM OF FOOT: CPT

## 2023-03-22 NOTE — PATIENT INSTRUCTIONS
X-ray of left foot today. Results to determine if splinting recommended. Wear orthopedic boot for next week to help displace pressure. Rest, ice and elevate throughout the day. Ibuprofen or naproxen over-the-counter for pain.

## 2023-03-22 NOTE — PROGRESS NOTES
"Chief Complaint  Toe Injury (Broken left toe)    Subjective        Felipa Stern presents to [facility] PR[facility]  History of Present Illness    57-year-old female presenting with toe injury.  Patient Minnie PonceCHANDNI.  States she smashed her pinky toe on a footstool yesterday morning.  Has been treating with ice elevation and rest.  Increased swelling present and bruising.  Denies numbness or tingling.  Circulation still intact.        Objective   Vital Signs:  /82 (BP Location: Left arm, Patient Position: Sitting, Cuff Size: Large Adult)   Pulse 78   Temp 97.7 °F (36.5 °C) (Infrared)   Ht 160 cm (63\")   Wt 83.9 kg (185 lb)   SpO2 95%   BMI 32.77 kg/m²   Estimated body mass index is 32.77 kg/m² as calculated from the following:    Height as of this encounter: 160 cm (63\").    Weight as of this encounter: 83.9 kg (185 lb).             Physical Exam  Vitals reviewed.   Constitutional:       Appearance: Normal appearance.   HENT:      Head: Normocephalic.   Musculoskeletal:         General: Swelling present. Normal range of motion.      Cervical back: Normal range of motion.   Skin:     General: Skin is warm and dry.      Capillary Refill: Capillary refill takes less than 2 seconds.      Findings: Bruising present.   Neurological:      General: No focal deficit present.      Mental Status: She is alert and oriented to person, place, and time.   Psychiatric:         Mood and Affect: Mood normal.         Behavior: Behavior normal.         Thought Content: Thought content normal.         Judgment: Judgment normal.        Result Review :    Common labs    Common Labs 5/9/22 5/9/22 5/9/22 5/9/22 12/20/22 12/20/22 12/20/22 12/20/22    0907 0907 0907 0907 0843 0843 0843 0843   Glucose  106 (A)      114 (A)   BUN  18      18   Creatinine  0.83      0.81   Sodium  140      139   Potassium  4.7      4.7   Chloride  102      103   Calcium  9.9      9.5   Total Protein  7.1      7.1   Albumin "  4.2      4.10   Total Bilirubin  0.4      0.4   Alkaline Phosphatase  116      97   AST (SGOT)  23      18   ALT (SGPT)  21      15   WBC 7.4      6.22    Hemoglobin 14.8      14.6    Hematocrit 44.7      42.3    Platelets 381      350    Total Cholesterol    237 (A) 179      Triglycerides    178 (A) 90      HDL Cholesterol    53 57      LDL Cholesterol     152 (A) 106 (A)      Hemoglobin A1C   5.9 (A)   6.00 (A)     (A) Abnormal value       Comments are available for some flowsheets but are not being displayed.               Current Outpatient Medications on File Prior to Visit   Medication Sig Dispense Refill   • atorvastatin (LIPITOR) 10 MG tablet Take 1 tablet by mouth Daily. 90 tablet 1   • coenzyme Q10 100 MG capsule Take 1 capsule by mouth Daily.     • diphenhydrAMINE (BENADRYL) 25 mg capsule Take 1 capsule by mouth Every Night.     • ferrous sulfate 325 (65 FE) MG tablet Take 1 tablet by mouth Daily With Breakfast.     • Glucosamine-Chondroitin (GLUCOSAMINE CHONDR COMPLEX PO) Take  by mouth Daily. 5638-0463 mg     • losartan (COZAAR) 100 MG tablet Take 1 tablet by mouth Daily. 90 tablet 1   • Multiple Vitamins-Minerals (MULTIVITAMIN GUMMIES ADULT PO) Take 1 tablet by mouth Daily.     • Omega-3 Fatty Acids (OMEGA-3 FISH OIL PO) Take  by mouth Daily.     • Turmeric 500 MG tablet Take  by mouth Daily.     • vitamin D3 125 MCG (5000 UT) capsule capsule Take 1 capsule by mouth Daily.       No current facility-administered medications on file prior to visit.              Assessment and Plan   Diagnoses and all orders for this visit:    1. Closed nondisplaced fracture of phalanx of lesser toe of left foot, unspecified phalanx, initial encounter (Primary)  -     XR Foot 3+ View Left        Patient Instructions   X-ray of left foot today. Results to determine if splinting recommended. Wear orthopedic boot for next week to help displace pressure. Rest, ice and elevate throughout the day. Ibuprofen or naproxen  over-the-counter for pain.            Follow Up   Return if symptoms worsen or fail to improve.  Patient was given instructions and counseling regarding her condition or for health maintenance advice. Please see specific information pulled into the AVS if appropriate.

## 2023-03-22 NOTE — PROGRESS NOTES
No fracture found in foot.  Recommend rest, ice and elevation.  May wear orthopedic boot for comfort but not necessary.

## 2023-04-20 RX ORDER — ATORVASTATIN CALCIUM 10 MG/1
TABLET, FILM COATED ORAL
Qty: 90 TABLET | Refills: 0 | Status: SHIPPED | OUTPATIENT
Start: 2023-04-20

## 2023-05-14 DIAGNOSIS — I10 ESSENTIAL HYPERTENSION: ICD-10-CM

## 2023-05-15 RX ORDER — LOSARTAN POTASSIUM 100 MG/1
TABLET ORAL
Qty: 90 TABLET | Refills: 1 | Status: SHIPPED | OUTPATIENT
Start: 2023-05-15

## 2023-06-21 PROBLEM — E66.09 CLASS 1 OBESITY DUE TO EXCESS CALORIES WITH SERIOUS COMORBIDITY AND BODY MASS INDEX (BMI) OF 31.0 TO 31.9 IN ADULT: Status: ACTIVE | Noted: 2023-06-21

## 2023-06-21 PROBLEM — E66.811 CLASS 1 OBESITY DUE TO EXCESS CALORIES WITH SERIOUS COMORBIDITY AND BODY MASS INDEX (BMI) OF 31.0 TO 31.9 IN ADULT: Status: ACTIVE | Noted: 2023-06-21

## 2023-06-21 PROBLEM — Z01.419 WOMEN'S ANNUAL ROUTINE GYNECOLOGICAL EXAMINATION: Status: RESOLVED | Noted: 2022-08-17 | Resolved: 2023-06-21

## 2023-07-24 RX ORDER — ATORVASTATIN CALCIUM 10 MG/1
TABLET, FILM COATED ORAL
Qty: 90 TABLET | Refills: 1 | Status: SHIPPED | OUTPATIENT
Start: 2023-07-24

## 2023-08-07 ENCOUNTER — HOSPITAL ENCOUNTER (OUTPATIENT)
Dept: MAMMOGRAPHY | Facility: HOSPITAL | Age: 58
Discharge: HOME | End: 2023-08-07
Admitting: NURSE PRACTITIONER
Payer: COMMERCIAL

## 2023-08-07 DIAGNOSIS — Z12.31 SCREENING MAMMOGRAM FOR BREAST CANCER: ICD-10-CM

## 2023-08-07 PROCEDURE — 77063 BREAST TOMOSYNTHESIS BI: CPT

## 2023-08-07 PROCEDURE — 77067 SCR MAMMO BI INCL CAD: CPT

## 2023-11-12 DIAGNOSIS — I10 ESSENTIAL HYPERTENSION: ICD-10-CM

## 2023-11-13 RX ORDER — LOSARTAN POTASSIUM 100 MG/1
TABLET ORAL
Qty: 90 TABLET | Refills: 0 | Status: SHIPPED | OUTPATIENT
Start: 2023-11-13

## 2024-01-18 RX ORDER — ATORVASTATIN CALCIUM 10 MG/1
10 TABLET, FILM COATED ORAL DAILY
Qty: 90 TABLET | Refills: 1 | Status: SHIPPED | OUTPATIENT
Start: 2024-01-18

## 2024-02-08 ENCOUNTER — HOSPITAL ENCOUNTER (OUTPATIENT)
Dept: BONE DENSITY | Facility: HOSPITAL | Age: 59
Discharge: HOME | End: 2024-02-08
Admitting: NURSE PRACTITIONER
Payer: COMMERCIAL

## 2024-02-08 DIAGNOSIS — Z78.0 POST-MENOPAUSAL: ICD-10-CM

## 2024-02-08 PROCEDURE — 77080 DXA BONE DENSITY AXIAL: CPT

## 2024-02-10 DIAGNOSIS — I10 ESSENTIAL HYPERTENSION: ICD-10-CM

## 2024-02-12 RX ORDER — LOSARTAN POTASSIUM 100 MG/1
100 TABLET ORAL DAILY
Qty: 90 TABLET | Refills: 0 | Status: SHIPPED | OUTPATIENT
Start: 2024-02-12

## 2024-03-13 ENCOUNTER — OFFICE VISIT (OUTPATIENT)
Dept: INTERNAL MEDICINE | Facility: CLINIC | Age: 59
End: 2024-03-13
Payer: COMMERCIAL

## 2024-03-13 VITALS
WEIGHT: 183 LBS | BODY MASS INDEX: 32.43 KG/M2 | SYSTOLIC BLOOD PRESSURE: 122 MMHG | OXYGEN SATURATION: 94 % | HEIGHT: 63 IN | HEART RATE: 64 BPM | DIASTOLIC BLOOD PRESSURE: 80 MMHG

## 2024-03-13 DIAGNOSIS — I10 ESSENTIAL HYPERTENSION: ICD-10-CM

## 2024-03-13 DIAGNOSIS — Z00.00 ANNUAL PHYSICAL EXAM: Primary | ICD-10-CM

## 2024-03-13 DIAGNOSIS — R73.03 PREDIABETES: ICD-10-CM

## 2024-03-13 DIAGNOSIS — L40.9 PSORIASIS: ICD-10-CM

## 2024-03-13 DIAGNOSIS — E78.49 OTHER HYPERLIPIDEMIA: ICD-10-CM

## 2024-03-13 LAB
ALBUMIN SERPL-MCNC: 4.3 G/DL (ref 3.5–5.2)
ALBUMIN/GLOB SERPL: 1.6 G/DL
ALP SERPL-CCNC: 105 U/L (ref 39–117)
ALT SERPL-CCNC: 17 U/L (ref 1–33)
AST SERPL-CCNC: 20 U/L (ref 1–32)
BASOPHILS # BLD AUTO: 0.05 10*3/MM3 (ref 0–0.2)
BASOPHILS NFR BLD AUTO: 0.5 % (ref 0–1.5)
BILIRUB SERPL-MCNC: 0.5 MG/DL (ref 0–1.2)
BUN SERPL-MCNC: 16 MG/DL (ref 6–20)
BUN/CREAT SERPL: 22.5 (ref 7–25)
CALCIUM SERPL-MCNC: 9.8 MG/DL (ref 8.6–10.5)
CHLORIDE SERPL-SCNC: 101 MMOL/L (ref 98–107)
CHOLEST SERPL-MCNC: 202 MG/DL (ref 0–200)
CO2 SERPL-SCNC: 26.5 MMOL/L (ref 22–29)
CREAT SERPL-MCNC: 0.71 MG/DL (ref 0.57–1)
EGFRCR SERPLBLD CKD-EPI 2021: 99.9 ML/MIN/1.73
EOSINOPHIL # BLD AUTO: 0.26 10*3/MM3 (ref 0–0.4)
EOSINOPHIL NFR BLD AUTO: 2.8 % (ref 0.3–6.2)
ERYTHROCYTE [DISTWIDTH] IN BLOOD BY AUTOMATED COUNT: 12.6 % (ref 12.3–15.4)
GLOBULIN SER CALC-MCNC: 2.7 GM/DL
GLUCOSE SERPL-MCNC: 101 MG/DL (ref 65–99)
HBA1C MFR BLD: 6.2 % (ref 4.8–5.6)
HCT VFR BLD AUTO: 44.2 % (ref 34–46.6)
HDLC SERPL-MCNC: 64 MG/DL (ref 40–60)
HGB BLD-MCNC: 14.9 G/DL (ref 12–15.9)
IMM GRANULOCYTES # BLD AUTO: 0.03 10*3/MM3 (ref 0–0.05)
IMM GRANULOCYTES NFR BLD AUTO: 0.3 % (ref 0–0.5)
LDLC SERPL CALC-MCNC: 117 MG/DL (ref 0–100)
LYMPHOCYTES # BLD AUTO: 3.29 10*3/MM3 (ref 0.7–3.1)
LYMPHOCYTES NFR BLD AUTO: 35.7 % (ref 19.6–45.3)
MCH RBC QN AUTO: 31.3 PG (ref 26.6–33)
MCHC RBC AUTO-ENTMCNC: 33.7 G/DL (ref 31.5–35.7)
MCV RBC AUTO: 92.9 FL (ref 79–97)
MONOCYTES # BLD AUTO: 0.56 10*3/MM3 (ref 0.1–0.9)
MONOCYTES NFR BLD AUTO: 6.1 % (ref 5–12)
NEUTROPHILS # BLD AUTO: 5.02 10*3/MM3 (ref 1.7–7)
NEUTROPHILS NFR BLD AUTO: 54.6 % (ref 42.7–76)
NRBC BLD AUTO-RTO: 0 /100 WBC (ref 0–0.2)
PLATELET # BLD AUTO: 375 10*3/MM3 (ref 140–450)
POTASSIUM SERPL-SCNC: 5.2 MMOL/L (ref 3.5–5.2)
PROT SERPL-MCNC: 7 G/DL (ref 6–8.5)
RBC # BLD AUTO: 4.76 10*6/MM3 (ref 3.77–5.28)
SODIUM SERPL-SCNC: 139 MMOL/L (ref 136–145)
TRIGL SERPL-MCNC: 122 MG/DL (ref 0–150)
TSH SERPL DL<=0.005 MIU/L-ACNC: 2.21 UIU/ML (ref 0.27–4.2)
VLDLC SERPL CALC-MCNC: 21 MG/DL (ref 5–40)
WBC # BLD AUTO: 9.21 10*3/MM3 (ref 3.4–10.8)

## 2024-03-13 NOTE — ASSESSMENT & PLAN NOTE
Lipid abnormalities are stable    Plan:  Continue same medication/s without change.      Discussed medication dosage, use, side effects, and goals of treatment in detail.    Counseled patient on lifestyle modifications to help control hyperlipidemia.     Patient Treatment Goals:   LDL goal is under 100    Followup at the next regular appointment.

## 2024-03-13 NOTE — ASSESSMENT & PLAN NOTE
Recommended 150-300 min weekly exercise  Continue with healthy diet choices  Labs ordered  Recommended monthly self breast examinations  Mammogram UTD  Pap smear UTD 2022  Dexa scan UTD  Anticipatory guidance given regarding health prevention/wellness, diet/exercise, tobacco/alcohol/drug education, exercise and wellbeing, covid 19 guidance, vaccination recommendations, and sexual health/STD education.   Recommended bi-yearly dental exams and regular vision examinations.

## 2024-03-13 NOTE — PROGRESS NOTES
"Chief Complaint  Annual Exam    Subjective        Felipa Stern presents to [facility]  History of Present Illness  This is a 59 y/o female presenting to office for CPE and for chronic health conditions.     Patient reports regular exercise; following healthy diet choices.     Denies tobacco use. Reports occasional alcohol use-- about 4-5 drinks a week.     Mammogram UTD-- 8/2023.   Pap smear UTD 2022  Dexa scan 2/8/24.     HTN-- continues on losartan; denies checking BP at home; denies CP.     HLD-- continues on statin therapy; reports working on exercise and diet modification.     Patient reports recently pulling her right knee about 4 weeks ago; had been wearing neoprene brace; reports it has been improving. Reports using an elliptical machine for under her desk and this has been helping using this. Reports she has not had to take any tylenol.     UTD with dental exam.     Objective   Vital Signs:  /80 (BP Location: Left arm, Patient Position: Sitting, Cuff Size: Adult)   Pulse 64   Ht 160 cm (63\")   Wt 83 kg (183 lb)   SpO2 94%   BMI 32.42 kg/m²   Estimated body mass index is 32.42 kg/m² as calculated from the following:    Height as of this encounter: 160 cm (63\").    Weight as of this encounter: 83 kg (183 lb).               Physical Exam  Constitutional:       Appearance: Normal appearance.   HENT:      Head: Normocephalic and atraumatic.      Right Ear: Tympanic membrane, ear canal and external ear normal.      Left Ear: Tympanic membrane, ear canal and external ear normal.      Nose: Nose normal.      Mouth/Throat:      Mouth: Mucous membranes are moist.      Pharynx: Oropharynx is clear.   Eyes:      Conjunctiva/sclera: Conjunctivae normal.      Pupils: Pupils are equal, round, and reactive to light.   Neck:      Vascular: No carotid bruit.   Cardiovascular:      Rate and Rhythm: Normal rate and regular rhythm.      Pulses: Normal pulses.      Heart sounds: " Normal heart sounds. No murmur heard.     No gallop.   Pulmonary:      Effort: Pulmonary effort is normal. No respiratory distress.      Breath sounds: Normal breath sounds. No stridor. No wheezing, rhonchi or rales.   Abdominal:      General: Bowel sounds are normal. There is no distension.      Palpations: Abdomen is soft.      Tenderness: There is no abdominal tenderness.   Musculoskeletal:         General: No tenderness.      Cervical back: Normal range of motion and neck supple.   Skin:     Capillary Refill: Capillary refill takes less than 2 seconds.      Findings: Rash present.          Neurological:      Mental Status: She is alert and oriented to person, place, and time. Mental status is at baseline.   Psychiatric:         Mood and Affect: Mood normal.         Thought Content: Thought content normal.         Judgment: Judgment normal.        Result Review :    The following data was reviewed by: CHANDNI Miller on 2024:  Common labs          2023    09:01   Common Labs   Glucose 118    BUN 13    Creatinine 0.77    Sodium 138    Potassium 5.0    Chloride 104    Calcium 10.3    Total Protein 7.0    Albumin 4.3    Total Bilirubin 0.4    Alkaline Phosphatase 86    AST (SGOT) 16    ALT (SGPT) 18    WBC 6.28    Hemoglobin 14.7    Hematocrit 42.6    Platelets 346    Total Cholesterol 178    Triglycerides 128    HDL Cholesterol 54    LDL Cholesterol  101    Hemoglobin A1C 5.50      Tobacco Use: Low Risk  (3/13/2024)    Patient History     Smoking Tobacco Use: Never     Smokeless Tobacco Use: Never     Passive Exposure: Not on file     Social History     Substance and Sexual Activity   Alcohol Use Yes    Alcohol/week: 6.0 standard drinks of alcohol    Types: 4 Glasses of wine, 1 Cans of beer, 1 Shots of liquor per week    Comment: occasional     Family History   Problem Relation Age of Onset    Lung cancer Mother          age 72    Cancer Mother         Lung    Coronary artery disease Father      Thyroid disease Father     Heart disease Father         Triple bypass age 70    Vision loss Father         Suspected, onset age 92    Diabetes Father         Onset age 92    Osteoporosis Other     Heart disease Maternal Grandmother         Fatal heart attack at age 81.    Stroke Maternal Uncle                   Assessment and Plan     Diagnoses and all orders for this visit:    1. Annual physical exam (Primary)  Assessment & Plan:  Recommended 150-300 min weekly exercise  Continue with healthy diet choices  Labs ordered  Recommended monthly self breast examinations  Mammogram UTD  Pap smear UTD 2022  Dexa scan UTD  Anticipatory guidance given regarding health prevention/wellness, diet/exercise, tobacco/alcohol/drug education, exercise and wellbeing, covid 19 guidance, vaccination recommendations, and sexual health/STD education.   Recommended bi-yearly dental exams and regular vision examinations.         2. Essential hypertension  Assessment & Plan:  Hypertension is stable and controlled  Continue current treatment regimen.  Blood pressure will be reassessed in 6 months.    Orders:  -     CBC & Differential  -     Comprehensive metabolic panel    3. Prediabetes  Assessment & Plan:  Recommended lifestyle management with diet and exercise    Orders:  -     Hemoglobin A1c    4. Other hyperlipidemia  Assessment & Plan:   Lipid abnormalities are stable    Plan:  Continue same medication/s without change.      Discussed medication dosage, use, side effects, and goals of treatment in detail.    Counseled patient on lifestyle modifications to help control hyperlipidemia.     Patient Treatment Goals:   LDL goal is under 100    Followup at the next regular appointment.    Orders:  -     TSH Rfx On Abnormal To Free T4  -     Lipid panel    5. Psoriasis  Assessment & Plan:  Recommended further f/u with dermatology-- new referral placed    Orders:  -     Ambulatory Referral to Dermatology             Follow Up     Return in  about 6 months (around 9/13/2024) for Recheck chronic conditions.  Patient was given instructions and counseling regarding her condition or for health maintenance advice. Please see specific information pulled into the AVS if appropriate.

## 2024-03-14 NOTE — PROGRESS NOTES
The 10-year ASCVD risk score (Jeri BULLARD, et al., 2019) is: 2.4%    Values used to calculate the score:      Age: 58 years      Sex: Female      Is Non- : No      Diabetic: No      Tobacco smoker: No      Systolic Blood Pressure: 122 mmHg      Is BP treated: Yes      HDL Cholesterol: 64 mg/dL      Total Cholesterol: 202 mg/dL

## 2024-05-14 DIAGNOSIS — I10 ESSENTIAL HYPERTENSION: ICD-10-CM

## 2024-05-14 RX ORDER — LOSARTAN POTASSIUM 100 MG/1
100 TABLET ORAL DAILY
Qty: 90 TABLET | Refills: 1 | Status: SHIPPED | OUTPATIENT
Start: 2024-05-14

## 2024-07-22 RX ORDER — ATORVASTATIN CALCIUM 10 MG/1
10 TABLET, FILM COATED ORAL DAILY
Qty: 90 TABLET | Refills: 1 | OUTPATIENT
Start: 2024-07-22

## 2024-08-12 ENCOUNTER — TELEMEDICINE (OUTPATIENT)
Dept: INTERNAL MEDICINE | Facility: CLINIC | Age: 59
End: 2024-08-12
Payer: COMMERCIAL

## 2024-08-12 ENCOUNTER — TELEPHONE (OUTPATIENT)
Dept: INTERNAL MEDICINE | Facility: CLINIC | Age: 59
End: 2024-08-12
Payer: COMMERCIAL

## 2024-08-12 DIAGNOSIS — U07.1 COVID: Primary | ICD-10-CM

## 2024-08-12 PROCEDURE — 99213 OFFICE O/P EST LOW 20 MIN: CPT

## 2024-08-12 NOTE — PATIENT INSTRUCTIONS
Take Paxlovid as prescribed.  Hold atorvastatin while taking Paxlovid.  Avoid exposure to other people while symptoms present.  Stay hydrated with water.  Encourage nutrition and rest.  Follow-up as needed.

## 2024-08-12 NOTE — TELEPHONE ENCOUNTER
Caller: Felipa Stern    Relationship to patient: Self    Best call back number: [phone]     Date of positive COVID19 test: 08.11.24    Date of possible COVID19 exposure: UNKNOWN    COVID19 symptoms: CHILLS, FEVER, NASAL DRAINAGE, SNEEZING, COUGH, LOSS OF TASTE & SMELL    Date of initial quarantine: 08.11.24    Additional information or concerns: PATIENT IS WONDERING IF SHE SHOULD BE ON PAXLOVID.     What is the patients preferred pharmacy: [facility] [id number] - [facility] [address] AT [facility] - [phone] [facility] [phone]  [phone]

## 2024-08-12 NOTE — TELEPHONE ENCOUNTER
Patient advised she needs to be evaluated for medicine. Pt scheduled with Nathan TARIQ for a virtual visit today at 3pm

## 2024-08-12 NOTE — PROGRESS NOTES
"Chief Complaint  No chief complaint on file.    Subjective        Felipa Stern presents to [facility]  History of Present Illness  58-year-old female presenting via video visit after positive at-home COVID test.  Patient of CHANDNI Miller.  Patient started having symptoms on Thursday including chills and fever.  Yesterday morning lost her sense of taste and smell.  Tested at home and was positive.  Has a slight cough due to postnasal drip.  Has fatigue.  Denies body aches, shortness of breath, wheezing or difficulty breathing.  Her 93-year-old father lives with her.      Objective   Vital Signs:  There were no vitals taken for this visit.  Estimated body mass index is 32.42 kg/m² as calculated from the following:    Height as of 3/13/24: 160 cm (63\").    Weight as of 3/13/24: 83 kg (183 lb).       BMI is >= 30 and <35. (Class 1 Obesity). The following options were offered after discussion;: exercise counseling/recommendations and nutrition counseling/recommendations      Physical Exam  Vitals reviewed: Unable to perform physical exam due to video visit..        Result Review :      Common labs          3/13/2024    14:21   Common Labs   Glucose 101    BUN 16    Creatinine 0.71    Sodium 139    Potassium 5.2    Chloride 101    Calcium 9.8    Total Protein 7.0    Albumin 4.3    Total Bilirubin 0.5    Alkaline Phosphatase 105    AST (SGOT) 20    ALT (SGPT) 17    WBC 9.21    Hemoglobin 14.9    Hematocrit 44.2    Platelets 375    Total Cholesterol 202    Triglycerides 122    HDL Cholesterol 64    LDL Cholesterol  117    Hemoglobin A1C 6.20          Current Outpatient Medications on File Prior to Visit   Medication Sig Dispense Refill    atorvastatin (LIPITOR) 10 MG tablet TAKE 1 TABLET BY MOUTH DAILY 90 tablet 1    coenzyme Q10 100 MG capsule Take 1 capsule by mouth Daily.      Glucosamine-Chondroitin (GLUCOSAMINE CHONDR COMPLEX PO) Take  by mouth Daily. 4526-2110 mg      losartan " (COZAAR) 100 MG tablet TAKE 1 TABLET BY MOUTH DAILY 90 tablet 1    Multiple Vitamins-Minerals (MULTIVITAMIN GUMMIES ADULT PO) Take 1 tablet by mouth Daily.      Omega-3 Fatty Acids (OMEGA-3 FISH OIL PO) Take  by mouth Daily.      vitamin D3 125 MCG (5000 UT) capsule capsule Take 1 capsule by mouth Daily.       No current facility-administered medications on file prior to visit.                Assessment and Plan     Diagnoses and all orders for this visit:    1. COVID (Primary)  -     Nirmatrelvir & Ritonavir, 300mg/100mg, (PAXLOVID); Take 3 tablets by mouth 2 (Two) Times a Day.  Dispense: 30 each; Refill: 0        Patient Instructions   Take Paxlovid as prescribed.  Hold atorvastatin while taking Paxlovid.  Avoid exposure to other people while symptoms present.  Stay hydrated with water.  Encourage nutrition and rest.  Follow-up as needed.    You have chosen to receive care through a telehealth visit.  Do you consent to use a video/audio connection for your medical care today? Yes    During visit, patient was at her residence and provider at medical practice office.         Follow Up     Return if symptoms worsen or fail to improve.  Patient was given instructions and counseling regarding her condition or for health maintenance advice. Please see specific information pulled into the AVS if appropriate.

## 2024-09-12 ENCOUNTER — DOCUMENTATION (OUTPATIENT)
Dept: INTERNAL MEDICINE | Facility: CLINIC | Age: 59
End: 2024-09-12
Payer: COMMERCIAL

## 2024-09-12 NOTE — PROGRESS NOTES
Patient called on-call provider stating that she was having chest pressure, left arm numbness and had 2 episodes of sweating.  Patient states chest pressure has been ongoing, but other symptoms recently began.  Patient is currently in [place] visiting her dad.  I educated patient to urgently go to the emergency room.  Patient verbalized understanding and is agreeable to plan.

## 2024-09-20 ENCOUNTER — OFFICE VISIT (OUTPATIENT)
Dept: INTERNAL MEDICINE | Facility: CLINIC | Age: 59
End: 2024-09-20
Payer: COMMERCIAL

## 2024-09-20 VITALS
SYSTOLIC BLOOD PRESSURE: 122 MMHG | DIASTOLIC BLOOD PRESSURE: 80 MMHG | WEIGHT: 182 LBS | BODY MASS INDEX: 32.25 KG/M2 | HEART RATE: 72 BPM | OXYGEN SATURATION: 96 % | HEIGHT: 63 IN

## 2024-09-20 DIAGNOSIS — Z12.31 SCREENING MAMMOGRAM FOR BREAST CANCER: ICD-10-CM

## 2024-09-20 DIAGNOSIS — R01.1 SYSTOLIC MURMUR: ICD-10-CM

## 2024-09-20 DIAGNOSIS — R73.03 PREDIABETES: ICD-10-CM

## 2024-09-20 DIAGNOSIS — E78.2 MIXED HYPERLIPIDEMIA: ICD-10-CM

## 2024-09-20 DIAGNOSIS — I10 ESSENTIAL HYPERTENSION: ICD-10-CM

## 2024-09-20 DIAGNOSIS — R07.89 OTHER CHEST PAIN: Primary | ICD-10-CM

## 2024-09-20 RX ORDER — ASPIRIN 81 MG/1
81 TABLET, CHEWABLE ORAL DAILY
COMMUNITY

## 2024-09-21 LAB
ALBUMIN SERPL-MCNC: 4 G/DL (ref 3.5–5.2)
ALBUMIN/GLOB SERPL: 1.3 G/DL
ALP SERPL-CCNC: 111 U/L (ref 39–117)
ALT SERPL-CCNC: 20 U/L (ref 1–33)
AST SERPL-CCNC: 18 U/L (ref 1–32)
BASOPHILS # BLD AUTO: 0.04 10*3/MM3 (ref 0–0.2)
BASOPHILS NFR BLD AUTO: 0.6 % (ref 0–1.5)
BILIRUB SERPL-MCNC: 0.4 MG/DL (ref 0–1.2)
BUN SERPL-MCNC: 18 MG/DL (ref 6–20)
BUN/CREAT SERPL: 23.7 (ref 7–25)
CALCIUM SERPL-MCNC: 10.1 MG/DL (ref 8.6–10.5)
CHLORIDE SERPL-SCNC: 102 MMOL/L (ref 98–107)
CHOLEST SERPL-MCNC: 185 MG/DL (ref 0–200)
CO2 SERPL-SCNC: 26.5 MMOL/L (ref 22–29)
CREAT SERPL-MCNC: 0.76 MG/DL (ref 0.57–1)
EGFRCR SERPLBLD CKD-EPI 2021: 92.1 ML/MIN/1.73
EOSINOPHIL # BLD AUTO: 0.16 10*3/MM3 (ref 0–0.4)
EOSINOPHIL NFR BLD AUTO: 2.6 % (ref 0.3–6.2)
ERYTHROCYTE [DISTWIDTH] IN BLOOD BY AUTOMATED COUNT: 12.6 % (ref 12.3–15.4)
GLOBULIN SER CALC-MCNC: 3.1 GM/DL
GLUCOSE SERPL-MCNC: 90 MG/DL (ref 65–99)
HBA1C MFR BLD: 6 % (ref 4.8–5.6)
HCT VFR BLD AUTO: 44.2 % (ref 34–46.6)
HDLC SERPL-MCNC: 57 MG/DL (ref 40–60)
HGB BLD-MCNC: 14.7 G/DL (ref 12–15.9)
IMM GRANULOCYTES # BLD AUTO: 0.02 10*3/MM3 (ref 0–0.05)
IMM GRANULOCYTES NFR BLD AUTO: 0.3 % (ref 0–0.5)
LDLC SERPL CALC-MCNC: 105 MG/DL (ref 0–100)
LYMPHOCYTES # BLD AUTO: 2.48 10*3/MM3 (ref 0.7–3.1)
LYMPHOCYTES NFR BLD AUTO: 39.6 % (ref 19.6–45.3)
MCH RBC QN AUTO: 31.8 PG (ref 26.6–33)
MCHC RBC AUTO-ENTMCNC: 33.3 G/DL (ref 31.5–35.7)
MCV RBC AUTO: 95.7 FL (ref 79–97)
MONOCYTES # BLD AUTO: 0.4 10*3/MM3 (ref 0.1–0.9)
MONOCYTES NFR BLD AUTO: 6.4 % (ref 5–12)
NEUTROPHILS # BLD AUTO: 3.16 10*3/MM3 (ref 1.7–7)
NEUTROPHILS NFR BLD AUTO: 50.5 % (ref 42.7–76)
NRBC BLD AUTO-RTO: 0 /100 WBC (ref 0–0.2)
PLATELET # BLD AUTO: 376 10*3/MM3 (ref 140–450)
POTASSIUM SERPL-SCNC: 5 MMOL/L (ref 3.5–5.2)
PROT SERPL-MCNC: 7.1 G/DL (ref 6–8.5)
RBC # BLD AUTO: 4.62 10*6/MM3 (ref 3.77–5.28)
SODIUM SERPL-SCNC: 138 MMOL/L (ref 136–145)
TRIGL SERPL-MCNC: 130 MG/DL (ref 0–150)
TSH SERPL DL<=0.005 MIU/L-ACNC: 1.8 UIU/ML (ref 0.27–4.2)
VLDLC SERPL CALC-MCNC: 23 MG/DL (ref 5–40)
WBC # BLD AUTO: 6.26 10*3/MM3 (ref 3.4–10.8)

## 2024-10-11 ENCOUNTER — OFFICE VISIT (OUTPATIENT)
Age: 59
End: 2024-10-11
Payer: COMMERCIAL

## 2024-10-11 VITALS
DIASTOLIC BLOOD PRESSURE: 70 MMHG | BODY MASS INDEX: 31.89 KG/M2 | HEART RATE: 73 BPM | WEIGHT: 180 LBS | HEIGHT: 63 IN | SYSTOLIC BLOOD PRESSURE: 110 MMHG

## 2024-10-11 DIAGNOSIS — R07.9 CHRONIC CHEST PAIN WITH LOW TO MODERATE RISK FOR CAD: Primary | ICD-10-CM

## 2024-10-11 DIAGNOSIS — Z91.89 CHRONIC CHEST PAIN WITH LOW TO MODERATE RISK FOR CAD: Primary | ICD-10-CM

## 2024-10-11 DIAGNOSIS — G89.29 CHRONIC CHEST PAIN WITH LOW TO MODERATE RISK FOR CAD: Primary | ICD-10-CM

## 2024-10-11 PROCEDURE — 99204 OFFICE O/P NEW MOD 45 MIN: CPT | Performed by: INTERNAL MEDICINE

## 2024-10-11 PROCEDURE — 93000 ELECTROCARDIOGRAM COMPLETE: CPT | Performed by: INTERNAL MEDICINE

## 2024-10-11 NOTE — PROGRESS NOTES
Subjective:     Encounter Date:10/11/2024      Patient ID: Felipa Stern is a 58 y.o. female.    Chief Complaint: Chest pain  HPI:   This is a 58-year-old woman with hypertension hyperlipidemia who presents for evaluation of chest pain.  She her father is my patient.  He has a history of CABG and heart failure.  She was recently traveling in [place] packing of her father's home.  While sorting through items she developed substernal chest fullness with paresthesias in the arm radiating to the elbow.  She felt very hot and sweaty.  This happened twice in a short period of time and therefore she presented to an hospital in the [place] area.  The EKG was reported as normal and she had 2 normal troponins.  She was discharged home for follow-up here in town.  Since then she has not had any further chest pain associated with angina though she does have fullness in the chest at times and ongoing paresthesias in the arm.  She does think that at times the fullness in the chest is exacerbated by certain twisting motions.  She has not had any exertional symptoms but she does not do any strenuous exercise.  He does not have palpitations though when she feels her pulse she occasionally feels irregularity  The following portions of the patient's history were reviewed and updated as appropriate: allergies, current medications, past family history, past medical history, past social history, past surgical history and problem list.     REVIEW OF SYSTEMS:   All systems reviewed.  Pertinent positives identified in HPI.  All other systems are negative.    Past Medical History:   Diagnosis Date    Allergic 1990    Mostly mild, intermittent.    Allergic rhinitis     Back pain     Breast cyst     Class 1 obesity due to excess calories with serious comorbidity and body mass index (BMI) of 31.0 to 31.9 in adult 06/21/2023    Elevated blood sugar     Essential hypertension 02/23/2018    H/O mammogram     Hyperlipidemia     Systolic  murmur 2024    Tinnitus     Tremor     Visual impairment ?    Ocular pressure runs higer than normal.       Family History   Problem Relation Age of Onset    Lung cancer Mother          age 72    Cancer Mother         Lung    Coronary artery disease Father     Thyroid disease Father     Heart disease Father         Triple bypass age 70    Vision loss Father         Suspected, onset age 92    Diabetes Father         Onset age 92    Osteoporosis Other     Heart disease Maternal Grandmother         Fatal heart attack at age 81.    Stroke Maternal Uncle        Social History     Socioeconomic History    Marital status:    Tobacco Use    Smoking status: Never    Smokeless tobacco: Never    Tobacco comments:     My mother was a chainsmoker.   Vaping Use    Vaping status: Never Used   Substance and Sexual Activity    Alcohol use: Yes     Alcohol/week: 6.0 standard drinks of alcohol     Types: 4 Glasses of wine, 1 Cans of beer, 1 Shots of liquor per week     Comment: occasional    Drug use: Never    Sexual activity: Not Currently     Partners: Male     Birth control/protection: Abstinence, Post-menopausal       No Known Allergies    Past Surgical History:   Procedure Laterality Date    COLONOSCOPY N/A 2018    Procedure: COLONOSCOPY INTO CECUM;  Surgeon: Karel Merrill MD;  Location: [facility];  Service: General    COLONOSCOPY  2018    PAP SMEAR N/A 2012         ECG 12 Lead    Date/Time: 10/11/2024 12:04 PM  Performed by: Sierra Schmitt MD    Authorized by: Sierra Schmitt MD  Comparison: compared with previous ECG from 2024  Similar to previous ECG  Rhythm: sinus rhythm  Rate: normal  Conduction: right bundle branch block  QRS axis: normal  Other findings: non-specific ST-T wave changes    Clinical impression: abnormal EKG             Objective:         PHYSICAL EXAM:  GEN: VSS, no distress,   Eyes: normal sclera, normal lids and lashes  HENT: moist mucus membranes,    Respiratory: CTAB, no rales or wheezes  CV: RRR, soft 2 out of 6 systolic murmur left upper sternal border nonradiating, +2 DP and 2+ carotid pulses b/l  GI: NABS, soft,  Nontender, nondistended  MSK: no edema, no scoliosis or kyphosis  Skin: no rash, warm, dry  Heme/Lymph: no bruising or bleeding  Psych: organized thought, normal behavior and affect  Neuro: Cranial nerves grossly intact, Alert and Oriented x 3.         Assessment:          Diagnosis Plan   1. Chronic chest pain with low to moderate risk for CAD  Adult Transthoracic Echo Complete w/ Color, Spectral and Contrast if Necessary Per Protocol    Treadmill Stress Test             Plan:       1.  Chest pain: Chest fullness in a patient with hypertension, hyperlipidemia and family history of coronary disease.  There are atypical features including positional changes, however given risk factors will plan treadmill stress test.  Resting EKG only with right bundle  2.  Systolic murmur nonradiating left upper sternal border plan echocardiogram  3.  Quadrigeminy PVCs, mostly asymptomatic.  Await testing results of above    Shireen, thank you very much for referring this kind patient to me. Please call me with any questions or concerns. I will see the patient again in the office in 3 months.         Sierra Schmitt MD  10/11/24  [facility]    Outpatient Encounter Medications as of 10/11/2024   Medication Sig Dispense Refill    aspirin 81 MG chewable tablet Chew 1 tablet Daily.      atorvastatin (LIPITOR) 20 MG tablet Take 1 tablet by mouth Daily. 90 tablet 1    coenzyme Q10 100 MG capsule Take 1 capsule by mouth Daily.      Glucosamine-Chondroitin (GLUCOSAMINE CHONDR COMPLEX PO) Take  by mouth Daily. 1451-7515 mg      losartan (COZAAR) 100 MG tablet TAKE 1 TABLET BY MOUTH DAILY 90 tablet 1    Multiple Vitamins-Minerals (MULTIVITAMIN GUMMIES ADULT PO) Take 1 tablet by mouth Daily.      Omega-3 Fatty Acids (OMEGA-3 FISH OIL PO) Take  by mouth Daily.       vitamin D3 125 MCG (5000 UT) capsule capsule Take 1 capsule by mouth Daily.       No facility-administered encounter medications on file as of 10/11/2024.

## 2024-10-21 RX ORDER — ATORVASTATIN CALCIUM 10 MG/1
10 TABLET, FILM COATED ORAL DAILY
Qty: 90 TABLET | OUTPATIENT
Start: 2024-10-21

## 2024-11-11 ENCOUNTER — TELEPHONE (OUTPATIENT)
Dept: CARDIOLOGY | Facility: CLINIC | Age: 59
End: 2024-11-11
Payer: COMMERCIAL

## 2024-11-11 DIAGNOSIS — I10 ESSENTIAL HYPERTENSION: ICD-10-CM

## 2024-11-11 RX ORDER — LOSARTAN POTASSIUM 100 MG/1
100 TABLET ORAL DAILY
Qty: 90 TABLET | Refills: 1 | Status: SHIPPED | OUTPATIENT
Start: 2024-11-11

## 2024-11-13 ENCOUNTER — HOSPITAL ENCOUNTER (OUTPATIENT)
Dept: CARDIOLOGY | Facility: HOSPITAL | Age: 59
Discharge: HOME | End: 2024-11-13
Payer: COMMERCIAL

## 2024-11-13 VITALS
HEIGHT: 63 IN | HEART RATE: 66 BPM | SYSTOLIC BLOOD PRESSURE: 128 MMHG | WEIGHT: 180 LBS | DIASTOLIC BLOOD PRESSURE: 70 MMHG | BODY MASS INDEX: 31.89 KG/M2

## 2024-11-13 DIAGNOSIS — R07.9 CHRONIC CHEST PAIN WITH LOW TO MODERATE RISK FOR CAD: ICD-10-CM

## 2024-11-13 DIAGNOSIS — Z91.89 CHRONIC CHEST PAIN WITH LOW TO MODERATE RISK FOR CAD: ICD-10-CM

## 2024-11-13 DIAGNOSIS — G89.29 CHRONIC CHEST PAIN WITH LOW TO MODERATE RISK FOR CAD: ICD-10-CM

## 2024-11-13 LAB
AORTIC ARCH: 2.6 CM
AORTIC DIMENSIONLESS INDEX: 0.77 (DI)
ASCENDING AORTA: 2.7 CM
BH CV ECHO MEAS - ACS: 1.96 CM
BH CV ECHO MEAS - AO MAX PG: 5 MMHG
BH CV ECHO MEAS - AO MEAN PG: 3 MMHG
BH CV ECHO MEAS - AO ROOT AREA (BSA CORRECTED): 1.6 CM2
BH CV ECHO MEAS - AO ROOT DIAM: 2.9 CM
BH CV ECHO MEAS - AO V2 MAX: 112 CM/SEC
BH CV ECHO MEAS - AO V2 VTI: 23.9 CM
BH CV ECHO MEAS - AVA(I,D): 2.46 CM2
BH CV ECHO MEAS - EDV(CUBED): 31.7 ML
BH CV ECHO MEAS - EDV(MOD-SP2): 103 ML
BH CV ECHO MEAS - EDV(MOD-SP4): 78 ML
BH CV ECHO MEAS - EF(MOD-BP): 63.9 %
BH CV ECHO MEAS - EF(MOD-SP2): 58.3 %
BH CV ECHO MEAS - EF(MOD-SP4): 66.7 %
BH CV ECHO MEAS - ESV(CUBED): 12.5 ML
BH CV ECHO MEAS - ESV(MOD-SP2): 43 ML
BH CV ECHO MEAS - ESV(MOD-SP4): 26 ML
BH CV ECHO MEAS - FS: 26.7 %
BH CV ECHO MEAS - IVS/LVPW: 1.2 CM
BH CV ECHO MEAS - IVSD: 1 CM
BH CV ECHO MEAS - LAT PEAK E' VEL: 12.6 CM/SEC
BH CV ECHO MEAS - LV DIASTOLIC VOL/BSA (35-75): 42.2 CM2
BH CV ECHO MEAS - LV MASS(C)D: 78 GRAMS
BH CV ECHO MEAS - LV MAX PG: 2.7 MMHG
BH CV ECHO MEAS - LV MEAN PG: 1 MMHG
BH CV ECHO MEAS - LV SYSTOLIC VOL/BSA (12-30): 14.1 CM2
BH CV ECHO MEAS - LV V1 MAX: 81.4 CM/SEC
BH CV ECHO MEAS - LV V1 VTI: 18.5 CM
BH CV ECHO MEAS - LVIDD: 3.2 CM
BH CV ECHO MEAS - LVIDS: 2.32 CM
BH CV ECHO MEAS - LVOT AREA: 3.2 CM2
BH CV ECHO MEAS - LVOT DIAM: 2.01 CM
BH CV ECHO MEAS - LVPWD: 0.83 CM
BH CV ECHO MEAS - MED PEAK E' VEL: 10.3 CM/SEC
BH CV ECHO MEAS - MV A DUR: 0.15 SEC
BH CV ECHO MEAS - MV A MAX VEL: 77.1 CM/SEC
BH CV ECHO MEAS - MV DEC SLOPE: 457.7 CM/SEC2
BH CV ECHO MEAS - MV DEC TIME: 0.21 SEC
BH CV ECHO MEAS - MV E MAX VEL: 67.3 CM/SEC
BH CV ECHO MEAS - MV E/A: 0.87
BH CV ECHO MEAS - MV MAX PG: 3.4 MMHG
BH CV ECHO MEAS - MV MEAN PG: 1.56 MMHG
BH CV ECHO MEAS - MV P1/2T: 58.2 MSEC
BH CV ECHO MEAS - MV V2 VTI: 25.3 CM
BH CV ECHO MEAS - MVA(P1/2T): 3.8 CM2
BH CV ECHO MEAS - MVA(VTI): 2.33 CM2
BH CV ECHO MEAS - PA ACC TIME: 0.11 SEC
BH CV ECHO MEAS - PA V2 MAX: 125.7 CM/SEC
BH CV ECHO MEAS - PI END-D VEL: 158.1 CM/SEC
BH CV ECHO MEAS - PULM A REVS DUR: 0.12 SEC
BH CV ECHO MEAS - PULM A REVS VEL: 44.6 CM/SEC
BH CV ECHO MEAS - PULM DIAS VEL: 49.3 CM/SEC
BH CV ECHO MEAS - PULM S/D: 1.04
BH CV ECHO MEAS - PULM SYS VEL: 51.4 CM/SEC
BH CV ECHO MEAS - QP/QS: 0.9
BH CV ECHO MEAS - RAP SYSTOLE: 3 MMHG
BH CV ECHO MEAS - RV MAX PG: 1.95 MMHG
BH CV ECHO MEAS - RV V1 MAX: 69.8 CM/SEC
BH CV ECHO MEAS - RV V1 VTI: 16 CM
BH CV ECHO MEAS - RVDD: 4.9 CM
BH CV ECHO MEAS - RVOT DIAM: 2.05 CM
BH CV ECHO MEAS - RVSP: 53.5 MMHG
BH CV ECHO MEAS - SV(LVOT): 58.9 ML
BH CV ECHO MEAS - SV(MOD-SP2): 60 ML
BH CV ECHO MEAS - SV(MOD-SP4): 52 ML
BH CV ECHO MEAS - SV(RVOT): 53 ML
BH CV ECHO MEAS - SVI(LVOT): 31.8 ML/M2
BH CV ECHO MEAS - SVI(MOD-SP2): 32.4 ML/M2
BH CV ECHO MEAS - SVI(MOD-SP4): 28.1 ML/M2
BH CV ECHO MEAS - TAPSE (>1.6): 2.8 CM
BH CV ECHO MEAS - TR MAX PG: 50.5 MMHG
BH CV ECHO MEAS - TR MAX VEL: 355.4 CM/SEC
BH CV ECHO MEAS RV FREE WALL STRAIN: -25.4 %
BH CV ECHO MEASUREMENTS AVERAGE E/E' RATIO: 5.88
BH CV ECHO SHUNT ASSESSMENT PERFORMED (HIDDEN SCRIPTING): 1
BH CV STRESS BP STAGE 1: NORMAL
BH CV STRESS DURATION MIN STAGE 1: 3
BH CV STRESS DURATION MIN STAGE 2: 1
BH CV STRESS DURATION SEC STAGE 1: 0
BH CV STRESS DURATION SEC STAGE 2: 0
BH CV STRESS GRADE STAGE 1: 10
BH CV STRESS GRADE STAGE 2: 12
BH CV STRESS HR STAGE 1: 152
BH CV STRESS HR STAGE 2: 162
BH CV STRESS METS STAGE 1: 5
BH CV STRESS METS STAGE 2: 6
BH CV STRESS PROTOCOL 1: NORMAL
BH CV STRESS RECOVERY BP: NORMAL MMHG
BH CV STRESS RECOVERY HR: 85 BPM
BH CV STRESS SPEED STAGE 1: 1.7
BH CV STRESS SPEED STAGE 2: 2.5
BH CV STRESS STAGE 1: 1
BH CV STRESS STAGE 2: 2
BH CV XLRA - RV BASE: 5.2 CM
BH CV XLRA - RV LENGTH: 9 CM
BH CV XLRA - RV MID: 4.3 CM
BH CV XLRA - TDI S': 15.7 CM/SEC
LEFT ATRIUM VOLUME INDEX: 23.7 ML/M2
MAXIMAL PREDICTED HEART RATE: 164 BPM
PERCENT MAX PREDICTED HR: 98.78 %
SINUS: 2.8 CM
STJ: 2.39 CM
STRESS BASELINE BP: NORMAL MMHG
STRESS BASELINE HR: 77 BPM
STRESS PERCENT HR: 116 %
STRESS POST ESTIMATED WORKLOAD: 6 METS
STRESS POST EXERCISE DUR MIN: 4 MIN
STRESS POST EXERCISE DUR SEC: 0 SEC
STRESS POST PEAK BP: NORMAL MMHG
STRESS POST PEAK HR: 162 BPM
STRESS TARGET HR: 139 BPM

## 2024-11-13 PROCEDURE — 93017 CV STRESS TEST TRACING ONLY: CPT

## 2024-11-13 PROCEDURE — 25510000001 PERFLUTREN 6.52 MG/ML SUSPENSION 2 ML VIAL: Performed by: INTERNAL MEDICINE

## 2024-11-13 PROCEDURE — 93306 TTE W/DOPPLER COMPLETE: CPT

## 2024-11-13 PROCEDURE — 93356 MYOCRD STRAIN IMG SPCKL TRCK: CPT

## 2024-11-13 RX ADMIN — PERFLUTREN 2 ML: 6.52 INJECTION, SUSPENSION INTRAVENOUS at 12:29

## 2024-11-15 ENCOUNTER — HOSPITAL ENCOUNTER (OUTPATIENT)
Dept: CT IMAGING | Facility: HOSPITAL | Age: 59
Discharge: HOME | End: 2024-11-15
Admitting: INTERNAL MEDICINE
Payer: COMMERCIAL

## 2024-11-15 ENCOUNTER — TRANSCRIBE ORDERS (OUTPATIENT)
Dept: CARDIOLOGY | Facility: CLINIC | Age: 59
End: 2024-11-15
Payer: COMMERCIAL

## 2024-11-15 DIAGNOSIS — Z13.6 SCREENING FOR ISCHEMIC HEART DISEASE: ICD-10-CM

## 2024-11-15 DIAGNOSIS — Z01.810 PRE-OPERATIVE CARDIOVASCULAR EXAMINATION: Primary | ICD-10-CM

## 2024-11-15 DIAGNOSIS — I27.20 PULMONARY HYPERTENSION: Primary | ICD-10-CM

## 2024-11-15 PROCEDURE — 25510000001 IOPAMIDOL PER 1 ML: Performed by: INTERNAL MEDICINE

## 2024-11-15 PROCEDURE — 71275 CT ANGIOGRAPHY CHEST: CPT

## 2024-11-15 RX ORDER — IOPAMIDOL 755 MG/ML
100 INJECTION, SOLUTION INTRAVASCULAR
Status: COMPLETED | OUTPATIENT
Start: 2024-11-15 | End: 2024-11-15

## 2024-11-15 RX ADMIN — IOPAMIDOL 95 ML: 755 INJECTION, SOLUTION INTRAVENOUS at 12:23

## 2024-11-15 NOTE — PROGRESS NOTES
I called the patient to discuss her results.  I left another voicemail.  Explained that especially now that she does not have a blood clot in her lungs need to proceed with right heart catheterization to understand the etiology of pulmonary hypertension and RV dysfunction

## 2024-11-15 NOTE — NURSING NOTE
Dr. Brumfield spoke with Dr. Schmitt regarding CTA PE Protocol and stated patient may go home. Pt ambulated out to car per self.

## 2024-11-20 ENCOUNTER — LAB (OUTPATIENT)
Facility: HOSPITAL | Age: 59
End: 2024-11-20
Payer: COMMERCIAL

## 2024-11-20 DIAGNOSIS — Z13.6 SCREENING FOR ISCHEMIC HEART DISEASE: ICD-10-CM

## 2024-11-20 DIAGNOSIS — Z01.810 PRE-OPERATIVE CARDIOVASCULAR EXAMINATION: ICD-10-CM

## 2024-11-20 LAB
ANION GAP SERPL CALCULATED.3IONS-SCNC: 12 MMOL/L (ref 5–15)
BASOPHILS # BLD AUTO: 0.05 10*3/MM3 (ref 0–0.2)
BASOPHILS NFR BLD AUTO: 0.6 % (ref 0–1.5)
BUN SERPL-MCNC: 17 MG/DL (ref 6–20)
BUN/CREAT SERPL: 21 (ref 7–25)
CALCIUM SPEC-SCNC: 9.7 MG/DL (ref 8.6–10.5)
CHLORIDE SERPL-SCNC: 102 MMOL/L (ref 98–107)
CO2 SERPL-SCNC: 22 MMOL/L (ref 22–29)
CREAT SERPL-MCNC: 0.81 MG/DL (ref 0.57–1)
DEPRECATED RDW RBC AUTO: 42.3 FL (ref 37–54)
EGFRCR SERPLBLD CKD-EPI 2021: 85.3 ML/MIN/1.73
EOSINOPHIL # BLD AUTO: 0.22 10*3/MM3 (ref 0–0.4)
EOSINOPHIL NFR BLD AUTO: 2.8 % (ref 0.3–6.2)
ERYTHROCYTE [DISTWIDTH] IN BLOOD BY AUTOMATED COUNT: 12.5 % (ref 12.3–15.4)
GLUCOSE SERPL-MCNC: 105 MG/DL (ref 65–99)
HCT VFR BLD AUTO: 44.1 % (ref 34–46.6)
HGB BLD-MCNC: 14.5 G/DL (ref 12–15.9)
IMM GRANULOCYTES # BLD AUTO: 0.01 10*3/MM3 (ref 0–0.05)
IMM GRANULOCYTES NFR BLD AUTO: 0.1 % (ref 0–0.5)
LYMPHOCYTES # BLD AUTO: 2.97 10*3/MM3 (ref 0.7–3.1)
LYMPHOCYTES NFR BLD AUTO: 38.4 % (ref 19.6–45.3)
MCH RBC QN AUTO: 30.6 PG (ref 26.6–33)
MCHC RBC AUTO-ENTMCNC: 32.9 G/DL (ref 31.5–35.7)
MCV RBC AUTO: 93 FL (ref 79–97)
MONOCYTES # BLD AUTO: 0.55 10*3/MM3 (ref 0.1–0.9)
MONOCYTES NFR BLD AUTO: 7.1 % (ref 5–12)
NEUTROPHILS NFR BLD AUTO: 3.93 10*3/MM3 (ref 1.7–7)
NEUTROPHILS NFR BLD AUTO: 51 % (ref 42.7–76)
NRBC BLD AUTO-RTO: 0 /100 WBC (ref 0–0.2)
PLATELET # BLD AUTO: 352 10*3/MM3 (ref 140–450)
PMV BLD AUTO: 9.8 FL (ref 6–12)
POTASSIUM SERPL-SCNC: 4.3 MMOL/L (ref 3.5–5.2)
RBC # BLD AUTO: 4.74 10*6/MM3 (ref 3.77–5.28)
SODIUM SERPL-SCNC: 136 MMOL/L (ref 136–145)
WBC NRBC COR # BLD AUTO: 7.73 10*3/MM3 (ref 3.4–10.8)

## 2024-11-20 PROCEDURE — 80048 BASIC METABOLIC PNL TOTAL CA: CPT

## 2024-11-20 PROCEDURE — 36415 COLL VENOUS BLD VENIPUNCTURE: CPT

## 2024-11-20 PROCEDURE — 85025 COMPLETE CBC W/AUTO DIFF WBC: CPT

## 2024-11-27 ENCOUNTER — HOSPITAL ENCOUNTER (OUTPATIENT)
Facility: HOSPITAL | Age: 59
Setting detail: HOSPITAL OUTPATIENT SURGERY
Discharge: HOME | End: 2024-11-27
Attending: INTERNAL MEDICINE | Admitting: INTERNAL MEDICINE
Payer: COMMERCIAL

## 2024-11-27 VITALS
OXYGEN SATURATION: 99 % | DIASTOLIC BLOOD PRESSURE: 73 MMHG | SYSTOLIC BLOOD PRESSURE: 110 MMHG | WEIGHT: 180.4 LBS | HEIGHT: 64 IN | RESPIRATION RATE: 16 BRPM | HEART RATE: 64 BPM | TEMPERATURE: 97.6 F | BODY MASS INDEX: 30.8 KG/M2

## 2024-11-27 DIAGNOSIS — I27.20 PULMONARY HYPERTENSION: ICD-10-CM

## 2024-11-27 LAB
HCT VFR BLDA CALC: 39 % (ref 38–51)
HCT VFR BLDA CALC: 39 % (ref 38–51)
HGB BLDA-MCNC: 13.3 G/DL (ref 12–17)
HGB BLDA-MCNC: 13.3 G/DL (ref 12–17)
SAO2 % BLDA: 63 % (ref 95–98)
SAO2 % BLDA: 84 % (ref 95–98)

## 2024-11-27 PROCEDURE — 25010000002 LIDOCAINE 2% SOLUTION: Performed by: INTERNAL MEDICINE

## 2024-11-27 PROCEDURE — 93451 RIGHT HEART CATH: CPT | Performed by: INTERNAL MEDICINE

## 2024-11-27 PROCEDURE — 85018 HEMOGLOBIN: CPT

## 2024-11-27 PROCEDURE — 85014 HEMATOCRIT: CPT

## 2024-11-27 PROCEDURE — 82810 BLOOD GASES O2 SAT ONLY: CPT

## 2024-11-27 PROCEDURE — S0260 H&P FOR SURGERY: HCPCS | Performed by: INTERNAL MEDICINE

## 2024-11-27 PROCEDURE — C1894 INTRO/SHEATH, NON-LASER: HCPCS | Performed by: INTERNAL MEDICINE

## 2024-11-27 PROCEDURE — C1769 GUIDE WIRE: HCPCS | Performed by: INTERNAL MEDICINE

## 2024-11-27 RX ORDER — SODIUM CHLORIDE 0.9 % (FLUSH) 0.9 %
10 SYRINGE (ML) INJECTION EVERY 12 HOURS SCHEDULED
Status: DISCONTINUED | OUTPATIENT
Start: 2024-11-27 | End: 2024-11-27 | Stop reason: HOSPADM

## 2024-11-27 RX ORDER — LIDOCAINE HYDROCHLORIDE 20 MG/ML
INJECTION, SOLUTION INFILTRATION; PERINEURAL
Status: DISCONTINUED | OUTPATIENT
Start: 2024-11-27 | End: 2024-11-27 | Stop reason: HOSPADM

## 2024-11-27 RX ORDER — SODIUM CHLORIDE 9 MG/ML
75 INJECTION, SOLUTION INTRAVENOUS CONTINUOUS
Status: DISCONTINUED | OUTPATIENT
Start: 2024-11-28 | End: 2024-11-27 | Stop reason: HOSPADM

## 2024-11-27 RX ORDER — AMLODIPINE BESYLATE 5 MG/1
5 TABLET ORAL DAILY
Qty: 90 TABLET | Refills: 3 | Status: SHIPPED | OUTPATIENT
Start: 2024-11-27

## 2024-11-27 NOTE — Clinical Note
Hemostasis started on the right brachial vein. Manual pressure applied to vessel. Manual pressure was held by Mora evans. Manual pressure was held for 5 min. Hemostasis achieved successfully. Closure device additional comment: Tegaderm and 4x4

## 2024-11-27 NOTE — H&P
This is a 58-year-old woman with hypertension hyperlipidemia who presents for evaluation of chest pain.  She her father is my patient.  He has a history of CABG and heart failure.  She was recently traveling in [place] packing of her father's home.  While sorting through items she developed substernal chest fullness with paresthesias in the arm radiating to the elbow.  She felt very hot and sweaty.  This happened twice in a short period of time and therefore she presented to an hospital in the [place] area.  The EKG was reported as normal and she had 2 normal troponins.  She was discharged home for follow-up here in [facility].  Since then she has not had any further chest pain associated with angina though she does have fullness in the chest at times and ongoing paresthesias in the arm.  She does think that at times the fullness in the chest is exacerbated by certain twisting motions.  She has not had any exertional symptoms but she does not do any strenuous exercise.  He does not have palpitations though when she feels her pulse she occasionally feels irregularity  The following portions of the patient's history were reviewed and updated as appropriate: allergies, current medications, past family history, past medical history, past social history, past surgical history and problem list.     REVIEW OF SYSTEMS:   All systems reviewed.  Pertinent positives identified in HPI.  All other systems are negative.     Medical History        Past Medical History:   Diagnosis Date    Allergic 1990     Mostly mild, intermittent.    Allergic rhinitis      Back pain      Breast cyst      Class 1 obesity due to excess calories with serious comorbidity and body mass index (BMI) of 31.0 to 31.9 in adult 06/21/2023    Elevated blood sugar      Essential hypertension 02/23/2018    H/O mammogram      Hyperlipidemia      Systolic murmur 9/20/2024    Tinnitus      Tremor      Visual impairment 1998?     Ocular pressure runs higer than normal.                   Family History   Problem Relation Age of Onset    Lung cancer Mother            age 72    Cancer Mother           Lung    Coronary artery disease Father      Thyroid disease Father      Heart disease Father           Triple bypass age 70    Vision loss Father           Suspected, onset age 92    Diabetes Father           Onset age 92    Osteoporosis Other      Heart disease Maternal Grandmother           Fatal heart attack at age 81.    Stroke Maternal Uncle           Social History   Social History            Socioeconomic History    Marital status:    Tobacco Use    Smoking status: Never    Smokeless tobacco: Never    Tobacco comments:       My mother was a chainsmoker.   Vaping Use    Vaping status: Never Used   Substance and Sexual Activity    Alcohol use: Yes       Alcohol/week: 6.0 standard drinks of alcohol       Types: 4 Glasses of wine, 1 Cans of beer, 1 Shots of liquor per week       Comment: occasional    Drug use: Never    Sexual activity: Not Currently       Partners: Male       Birth control/protection: Abstinence, Post-menopausal            Allergies   No Known Allergies        Surgical History         Past Surgical History:   Procedure Laterality Date    COLONOSCOPY N/A 2018     Procedure: COLONOSCOPY INTO CECUM;  Surgeon: Karel Merrill MD;  Location: [facility];  Service: General    COLONOSCOPY   2018    PAP SMEAR N/A 2012               ECG 12 Lead     Date/Time: 10/11/2024 12:04 PM  Performed by: Sierra Schmitt MD     Authorized by: Sierra Schmitt MD  Comparison: compared with previous ECG from 2024  Similar to previous ECG  Rhythm: sinus rhythm  Rate: normal  Conduction: right bundle branch block  QRS axis: normal  Other findings: non-specific ST-T wave changes     Clinical impression: abnormal EKG                    Objective:      Objective   PHYSICAL EXAM:  GEN: VSS, no distress,   Eyes: normal sclera, normal lids and lashes  HENT: moist  mucus membranes,   Respiratory: CTAB, no rales or wheezes  CV: RRR, soft 2 out of 6 systolic murmur left upper sternal border nonradiating, +2 DP and 2+ carotid pulses b/l  GI: NABS, soft,  Nontender, nondistended  MSK: no edema, no scoliosis or kyphosis  Skin: no rash, warm, dry  Heme/Lymph: no bruising or bleeding  Psych: organized thought, normal behavior and affect  Neuro: Cranial nerves grossly intact, Alert and Oriented x 3.            Assessment:      Assessment    Diagnosis Plan   1. Chronic chest pain with low to moderate risk for CAD  Adult Transthoracic Echo Complete w/ Color, Spectral and Contrast if Necessary Per Protocol     Treadmill Stress Test                   Plan:      Plan  1.  Chest pain: Chest fullness in a patient with hypertension, hyperlipidemia and family history of coronary disease.  There are atypical features including positional changes, however given risk factors will plan treadmill stress test.  Resting EKG only with right bundle  2.  Systolic murmur nonradiating left upper sternal border plan echocardiogram  3.  Quadrigeminy PVCs, mostly asymptomatic.  Await testing results of above     Shireen, thank you very much for referring this kind patient to me. Please call me with any questions or concerns. I will see the patient again in the office in 3 months.

## 2024-11-30 LAB
HCT VFR BLDA CALC: 38 % (ref 38–51)
HCT VFR BLDA CALC: 39 % (ref 38–51)
HGB BLDA-MCNC: 12.9 G/DL (ref 12–17)
HGB BLDA-MCNC: 13.3 G/DL (ref 12–17)
SAO2 % BLDA: 48 % (ref 95–98)
SAO2 % BLDA: 76 % (ref 95–98)

## 2024-12-04 LAB
HCT VFR BLDA CALC: 38 % (ref 38–51)
HCT VFR BLDA CALC: 39 % (ref 38–51)
HCT VFR BLDA CALC: 39 % (ref 38–51)
HGB BLDA-MCNC: 12.9 G/DL (ref 12–17)
HGB BLDA-MCNC: 13.3 G/DL (ref 12–17)
HGB BLDA-MCNC: 13.3 G/DL (ref 12–17)
SAO2 % BLDA: 79 % (ref 95–98)
SAO2 % BLDA: 81 % (ref 95–98)
SAO2 % BLDA: 84 % (ref 95–98)

## 2024-12-09 DIAGNOSIS — Q24.9 CONGENITAL HEART DISEASE WITH INTRACARDIAC SHUNTING: Primary | ICD-10-CM

## 2024-12-12 ENCOUNTER — OFFICE VISIT (OUTPATIENT)
Dept: INTERNAL MEDICINE | Facility: CLINIC | Age: 59
End: 2024-12-12
Payer: COMMERCIAL

## 2024-12-12 VITALS
BODY MASS INDEX: 30.97 KG/M2 | HEART RATE: 68 BPM | SYSTOLIC BLOOD PRESSURE: 122 MMHG | OXYGEN SATURATION: 96 % | HEIGHT: 64 IN | DIASTOLIC BLOOD PRESSURE: 80 MMHG

## 2024-12-12 DIAGNOSIS — L40.9 PSORIASIS: ICD-10-CM

## 2024-12-12 DIAGNOSIS — I10 ESSENTIAL HYPERTENSION: ICD-10-CM

## 2024-12-12 DIAGNOSIS — N20.0 KIDNEY STONE: ICD-10-CM

## 2024-12-12 DIAGNOSIS — E78.2 MIXED HYPERLIPIDEMIA: ICD-10-CM

## 2024-12-12 DIAGNOSIS — R73.03 PREDIABETES: ICD-10-CM

## 2024-12-12 DIAGNOSIS — I27.20 PULMONARY HTN: Primary | ICD-10-CM

## 2024-12-12 PROCEDURE — 99214 OFFICE O/P EST MOD 30 MIN: CPT | Performed by: NURSE PRACTITIONER

## 2024-12-12 NOTE — ASSESSMENT & PLAN NOTE
Hypertension is stable and controlled  Continue current treatment regimen.  Dietary sodium restriction.  Weight loss.  Regular aerobic exercise.  Blood pressure will be reassessed  at next appt .

## 2024-12-12 NOTE — ASSESSMENT & PLAN NOTE
Continue with low glycemic diet choices including reducing refined carbohydrates and simple sugars in diet. Recommended 150 minutes weekly exercise or as tolerated.   Will check A1c at next visit in March 2024

## 2024-12-12 NOTE — PROGRESS NOTES
"Chief Complaint  Hypertension and Hyperlipidemia    Subjective        Felipa Stern presents to [facility] PRIMARY CARE  History of Present Illness  This is a 60 y/o female presenting to office for f/u with chronic health conditions.     Patient had recent consultation with cardiology-- Dr. Schmitt; she had cardiac catheterization which indicated \"  Moderate pulmonary hypertension, normal PCWP    Step up between SVC and RA suggestive of left to right shunt, with shunt fraction of 5.8    Recommendations: Cardiac CTA\"  CTA is currently pending; echocardiogram showed RVC mod to severely dilated along with right atrial vacity moderately dilated; +mod PH; LVEF 63.9%  Her stress test did not show any evidence of myocardial ischemia  She continues on amlodipine, losartan for HTN. Continues on statin therapy for HLD. Also on aspirin 81mg daily.   Reports she is feeling better on the amlodipine; reports the chest fullness has improved.  Her CTA showed patchy hazy ground-glass opacity within both lungs with areas  of air trapping and mosaic pattern suggesting reactive airway disease or  small airway disease-- she has pending consultation with pulmonary.   She was noted to have 3mm nonobstructing renal stone on CT; denies any urology symptoms.         Objective   Vital Signs:  /80 (BP Location: Left arm, Patient Position: Sitting, Cuff Size: Adult)   Pulse 68   Ht 162.6 cm (64\")   SpO2 96%   BMI 30.97 kg/m²   Estimated body mass index is 30.97 kg/m² as calculated from the following:    Height as of this encounter: 162.6 cm (64\").    Weight as of 11/27/24: 81.8 kg (180 lb 6.4 oz).            Physical Exam  Constitutional:       Appearance: Normal appearance.   HENT:      Head: Atraumatic.      Right Ear: External ear normal.      Left Ear: External ear normal.      Nose: Nose normal.      Mouth/Throat:      Mouth: Mucous membranes are moist.      Pharynx: Oropharynx is clear.   Eyes:      " Conjunctiva/sclera: Conjunctivae normal.      Pupils: Pupils are equal, round, and reactive to light.   Cardiovascular:      Rate and Rhythm: Normal rate and regular rhythm.      Pulses: Normal pulses.      Heart sounds: Murmur heard.      Systolic murmur is present with a grade of 2/6.      No gallop.   Pulmonary:      Effort: Pulmonary effort is normal. No respiratory distress.      Breath sounds: Normal breath sounds. No stridor. No wheezing, rhonchi or rales.   Musculoskeletal:         General: No swelling.   Skin:     General: Skin is warm and dry.      Capillary Refill: Capillary refill takes less than 2 seconds.   Neurological:      Mental Status: She is alert and oriented to person, place, and time. Mental status is at baseline.   Psychiatric:         Mood and Affect: Mood normal.         Thought Content: Thought content normal.         Judgment: Judgment normal.        Result Review :  The following data was reviewed by: CHANDNI Miller on 12/12/2024:  Common labs          9/20/2024    14:35 11/20/2024    10:56 11/27/2024    10:22 11/27/2024    10:25 11/27/2024    10:30 11/27/2024    10:31 11/27/2024    10:32 11/27/2024    10:34 11/27/2024    10:38   Common Labs   Glucose 90  105           BUN 18  17           Creatinine 0.76  0.81           Sodium 138  136           Potassium 5.0  4.3           Chloride 102  102           Calcium 10.1  9.7           Total Protein 7.1            Albumin 4.0            Total Bilirubin 0.4            Alkaline Phosphatase 111            AST (SGOT) 18            ALT (SGPT) 20            WBC 6.26  7.73           Hemoglobin 14.7  14.5  13.3  13.3  12.9  13.3  13.3  13.3  12.9    Hematocrit 44.2  44.1  39  39  38  39  39  39  38    Platelets 376  352           Total Cholesterol 185            Triglycerides 130            HDL Cholesterol 57            LDL Cholesterol  105            Hemoglobin A1C 6.00              Tobacco Use: Low Risk  (12/12/2024)    Patient History      "Smoking Tobacco Use: Never     Smokeless Tobacco Use: Never     Passive Exposure: Not on file     Social History     Substance and Sexual Activity   Alcohol Use Yes    Alcohol/week: 6.0 standard drinks of alcohol    Types: 4 Glasses of wine, 1 Cans of beer, 1 Shots of liquor per week    Comment: occasional     Family History   Problem Relation Age of Onset    Lung cancer Mother          age 72    Cancer Mother         Lung    Coronary artery disease Father     Thyroid disease Father     Heart disease Father         Triple bypass age 70    Vision loss Father         Suspected, onset age 92    Diabetes Father         Onset age 92    Osteoporosis Other     Heart disease Maternal Grandmother         Fatal heart attack at age 81.    Stroke Maternal Uncle    Cardiac Catheterization/Vascular Study (2024 10:36 AM)    Treadmill Stress Test (2024 12:50 PM)   Adult Transthoracic Echo Complete w/ Color, Spectral and Contrast if Necessary Per Protocol (2024 12:28 PM)            Assessment and Plan   Diagnoses and all orders for this visit:    1. Pulmonary HTN (Primary)  Assessment & Plan:  Following with cardiology  CTA pending  \"  Moderate pulmonary hypertension, normal PCWP    Step up between SVC and RA suggestive of left to right shunt, with shunt fraction of 5.8    Recommendations: Cardiac CTA\"    Clinical Indication    Chest Pain; Murmur or Click   Dx: Chronic chest pain with low to moderate risk for CAD [R07.9, G89.29, Z91.89 (ICD-10-CM)]     Interpretation Summary         Left ventricular systolic function is normal. Calculated left ventricular EF = 63.9%    Left ventricular diastolic function was normal.    The right ventricular cavity is moderate to severely dilated.    Saline test results are negative.    The right atrial cavity is moderately  dilated.    Estimated right ventricular systolic pressure from tricuspid regurgitation is moderately elevated (45-55 mmHg). Calculated right ventricular " systolic pressure from tricuspid regurgitation is 54 mmHg.    Moderate pulmonary hypertension is present.     Continues on amlodipine; has f/u scheduled with cardiology and pulmonary   Denies current SOA; reports she is feeling much better on amlodipine      2. Essential hypertension  Assessment & Plan:  Hypertension is stable and controlled  Continue current treatment regimen.  Dietary sodium restriction.  Weight loss.  Regular aerobic exercise.  Blood pressure will be reassessed  at next appt .      3. Mixed hyperlipidemia  Assessment & Plan:  Continues on statin therapy       4. Prediabetes  Assessment & Plan:  Continue with low glycemic diet choices including reducing refined carbohydrates and simple sugars in diet. Recommended 150 minutes weekly exercise or as tolerated.   Will check A1c at next visit in March 2024      5. Psoriasis  -     Ambulatory Referral to Dermatology    6. Kidney stone  -     Ambulatory Referral to Urology             Follow Up   Return in about 3 months (around 3/12/2025) for Annual physical.  Patient was given instructions and counseling regarding her condition or for health maintenance advice. Please see specific information pulled into the AVS if appropriate.

## 2024-12-12 NOTE — ASSESSMENT & PLAN NOTE
"Following with cardiology  CTA pending  \"  Moderate pulmonary hypertension, normal PCWP    Step up between SVC and RA suggestive of left to right shunt, with shunt fraction of 5.8    Recommendations: Cardiac CTA\"    Clinical Indication    Chest Pain; Murmur or Click   Dx: Chronic chest pain with low to moderate risk for CAD [R07.9, G89.29, Z91.89 (ICD-10-CM)]     Interpretation Summary         Left ventricular systolic function is normal. Calculated left ventricular EF = 63.9%    Left ventricular diastolic function was normal.    The right ventricular cavity is moderate to severely dilated.    Saline test results are negative.    The right atrial cavity is moderately  dilated.    Estimated right ventricular systolic pressure from tricuspid regurgitation is moderately elevated (45-55 mmHg). Calculated right ventricular systolic pressure from tricuspid regurgitation is 54 mmHg.    Moderate pulmonary hypertension is present.     Continues on amlodipine; has f/u scheduled with cardiology and pulmonary   Denies current SOA; reports she is feeling much better on amlodipine  "

## 2025-01-29 ENCOUNTER — TELEPHONE (OUTPATIENT)
Dept: CARDIOLOGY | Facility: HOSPITAL | Age: 60
End: 2025-01-29
Payer: COMMERCIAL

## 2025-01-29 ENCOUNTER — OFFICE VISIT (OUTPATIENT)
Dept: CARDIOLOGY | Facility: CLINIC | Age: 60
End: 2025-01-29
Payer: COMMERCIAL

## 2025-01-29 VITALS
DIASTOLIC BLOOD PRESSURE: 90 MMHG | HEART RATE: 86 BPM | SYSTOLIC BLOOD PRESSURE: 136 MMHG | OXYGEN SATURATION: 97 % | HEIGHT: 64 IN | BODY MASS INDEX: 32.1 KG/M2 | WEIGHT: 188 LBS

## 2025-01-29 DIAGNOSIS — I27.20 PULMONARY HYPERTENSION: Primary | ICD-10-CM

## 2025-01-29 DIAGNOSIS — Q24.9 CONGENITAL HEART DISEASE WITH INTRACARDIAC SHUNTING: Primary | ICD-10-CM

## 2025-01-29 RX ORDER — ALBUTEROL SULFATE 90 UG/1
INHALANT RESPIRATORY (INHALATION)
COMMUNITY
Start: 2025-01-23

## 2025-01-29 RX ORDER — KETOCONAZOLE 20 MG/ML
SHAMPOO, SUSPENSION TOPICAL
COMMUNITY
Start: 2025-01-14

## 2025-01-29 RX ORDER — CLOBETASOL PROPIONATE 0.5 MG/ML
SOLUTION TOPICAL
COMMUNITY
Start: 2025-01-14

## 2025-01-29 NOTE — PROGRESS NOTES
"  Date of Office Visit: 2025  Encounter Provider: CHANDNI Baum  Place of Service: [facility] CARDIOLOGY  Patient Name: Felipa Stern  :1965    Chief complaint: Hypertension, chest pain    HPI: Felipa Stern is a 59 y.o. female who is a patient of Dr. Hodges and is new to me today she has a history of hypertension and hyperlipidemia.  She came to see Dr. Schmitt in October of last year for evaluation of chest discomfort.  She has a family history of coronary artery disease her father is our patient and has had bypass surgery as well as heart failure.  She had recently developed some substernal chest pain with paresthesias radiating to the right elbow she got very hot and clammy.  We heard a systolic murmur at the left upper sternal border and she had some PVCs.  We decided to send her for a treadmill stress test and echocardiogram.  Stress test showed normal EKG but exercise capacity was moderately impaired.  Echocardiogram showed some dilation of her RV and pulmonary hypertension.  She had moderate pulmonary hypertension with a normal pulmonary wedge pressure.  SVC and right atrium suggestive of the left-to-right shunt with a shunt fraction of 5.8.  We referred her for cardiac CT to rule out congenital heart disease.  She had a chest CT showed cardiomegaly with right heart enlargement and pulmonary artery enlargement.  She had a mosaic pattern in the lungs with air trapping and groundglass opacities thought to be associated with reactive airway disease or small airway disease.  There was also a nonobstructing left renal stone.    I do not see where she has gotten a cardiac CT.  She did see a pulmonologist he told her her lungs were \"small\".  He prescribed her an inhaler and told her to try to exercise.  He is also sending her for sleep apnea test.  She states she snores a lot and her  has been telling her for years that she has sleep apnea.  She is not " having any chest pain and as long as she does not overexert herself she feels fine.      Previous testing and notes have been reviewed by me.   Past Medical History:   Diagnosis Date    Allergic 1990    Mostly mild, intermittent.    Allergic rhinitis     Back pain     Breast cyst     Class 1 obesity due to excess calories with serious comorbidity and body mass index (BMI) of 31.0 to 31.9 in adult 2023    Elevated blood sugar     Essential hypertension 2018    H/O mammogram     Hyperlipidemia     Pulmonary hypertension     Systolic murmur 2024    Tinnitus     Tremor     Visual impairment ?    Ocular pressure runs higer than normal.       Past Surgical History:   Procedure Laterality Date    CARDIAC CATHETERIZATION N/A 2024    Procedure: RHC;  Surgeon: Sierra Schmitt MD;  Location: [facility] CATH INVASIVE LOCATION;  Service: Cardiology;  Laterality: N/A;    COLONOSCOPY N/A 2018    Procedure: COLONOSCOPY INTO CECUM;  Surgeon: Karel Merrill MD;  Location: [facility] ENDOSCOPY;  Service: General    COLONOSCOPY  2018    PAP SMEAR N/A 2012       Social History     Socioeconomic History    Marital status:    Tobacco Use    Smoking status: Never    Smokeless tobacco: Never    Tobacco comments:     My mother was a chainsmoker.   Vaping Use    Vaping status: Never Used   Substance and Sexual Activity    Alcohol use: Yes     Alcohol/week: 6.0 standard drinks of alcohol     Types: 4 Glasses of wine, 1 Cans of beer, 1 Shots of liquor per week     Comment: occasional    Drug use: Never    Sexual activity: Not Currently     Partners: Male     Birth control/protection: Abstinence, Post-menopausal       Family History   Problem Relation Age of Onset    Lung cancer Mother          age 72    Cancer Mother         Lung    Coronary artery disease Father     Thyroid disease Father     Heart disease Father         Triple bypass age 70    Vision loss Father         Suspected, onset age 92     Diabetes Father         Onset age 92    Osteoporosis Other     Heart disease Maternal Grandmother         Fatal heart attack at age 81.    Stroke Maternal Uncle        Review of Systems   Constitutional: Negative for diaphoresis and malaise/fatigue.   Cardiovascular:  Positive for dyspnea on exertion. Negative for chest pain, claudication, irregular heartbeat, leg swelling, near-syncope, orthopnea, palpitations, paroxysmal nocturnal dyspnea and syncope.   Respiratory:  Negative for cough, shortness of breath and sleep disturbances due to breathing.    Musculoskeletal:  Negative for falls.   Neurological:  Negative for dizziness and weakness.   Psychiatric/Behavioral:  Negative for altered mental status and substance abuse.        No Known Allergies      Current Outpatient Medications:     albuterol sulfate  (90 Base) MCG/ACT inhaler, , Disp: , Rfl:     amLODIPine (NORVASC) 5 MG tablet, Take 1 tablet by mouth Daily., Disp: 90 tablet, Rfl: 3    aspirin 81 MG chewable tablet, Chew 1 tablet Daily., Disp: , Rfl:     atorvastatin (LIPITOR) 20 MG tablet, Take 1 tablet by mouth Daily., Disp: 90 tablet, Rfl: 1    clobetasol (TEMOVATE) 0.05 % external solution, , Disp: , Rfl:     coenzyme Q10 100 MG capsule, Take 1 capsule by mouth Daily., Disp: , Rfl:     Glucosamine-Chondroitin (GLUCOSAMINE CHONDR COMPLEX PO), Take  by mouth Daily. 2426-9731 mg, Disp: , Rfl:     ketoconazole (NIZORAL) 2 % shampoo, , Disp: , Rfl:     losartan (COZAAR) 100 MG tablet, TAKE 1 TABLET BY MOUTH DAILY, Disp: 90 tablet, Rfl: 1    Multiple Vitamins-Minerals (MULTIVITAMIN GUMMIES ADULT PO), Take 1 tablet by mouth Daily., Disp: , Rfl:     Omega-3 Fatty Acids (OMEGA-3 FISH OIL PO), Take  by mouth Daily., Disp: , Rfl:     vitamin D3 125 MCG (5000 UT) capsule capsule, Take 1 capsule by mouth Daily., Disp: , Rfl:         Objective:     Vitals:    01/29/25 1217   BP: 136/90   BP Location: Left arm   Patient Position: Sitting   Pulse: 86   SpO2: 97%  "  Weight: 85.3 kg (188 lb)   Height: 162.6 cm (64\")     Body mass index is 32.27 kg/m².    PHYSICAL EXAM:    Constitutional:       General: Not in acute distress.     Appearance: Normal appearance. Well-developed.   Eyes:      Pupils: Pupils are equal, round, and reactive to light.   HENT:      Head: Normocephalic.   Neck:      Vascular: No carotid bruit or JVD.   Pulmonary:      Effort: Pulmonary effort is normal. No tachypnea.      Breath sounds: Normal breath sounds. No wheezing. No rales.   Cardiovascular:      Normal rate. Regular rhythm.      No gallop.    Pulses:     Intact distal pulses.   Edema:     Peripheral edema absent.   Abdominal:      General: Bowel sounds are normal.      Palpations: Abdomen is soft.      Tenderness: There is no abdominal tenderness.   Musculoskeletal: Normal range of motion.      Cervical back: Normal range of motion and neck supple. No edema. Skin:     General: Skin is warm and dry.   Neurological:      Mental Status: Alert and oriented to person, place, and time.           ECG 12 Lead    Date/Time: 1/29/2025 12:58 PM  Performed by: Nicolle Aldana APRN    Authorized by: Minnie Ponce APRN  Comparison: compared with previous ECG from 11/27/2024  Similar to previous ECG  Rhythm: sinus rhythm  Ectopy: unifocal PVCs  Rate: normal  Conduction: right bundle branch block  QRS axis: normal    Clinical impression: abnormal EKG        Lipid Panel          3/13/2024    14:21 9/20/2024    14:35   Lipid Panel   Total Cholesterol 202  185    Triglycerides 122  130    HDL Cholesterol 64  57    VLDL Cholesterol 21  23    LDL Cholesterol  117  105          Assessment/Plan:      Pulmonary hypertension-getting sleep study tonight.  Will schedule for cardiac CT to rule out causes such as ASD    2.  Chest pain-negative EKG during stress test, family history of coronary disease    3.  Essential hypertension continue amlodipine 5 mg daily and losartan 100 mg daily    4.  Dyslipidemia continue " atorvastatin 20 mg daily goal LDL less than 100.  Encouraged her to increase her physical activity.    Follow-up in 2 months with Dr. Schmitt         Your medication list            Accurate as of January 29, 2025 12:38 PM. If you have any questions, ask your nurse or doctor.                CONTINUE taking these medications        Instructions Last Dose Given Next Dose Due   albuterol sulfate  (90 Base) MCG/ACT inhaler  Commonly known as: PROVENTIL HFA;VENTOLIN HFA;PROAIR HFA           amLODIPine 5 MG tablet  Commonly known as: NORVASC      Take 1 tablet by mouth Daily.       aspirin 81 MG chewable tablet      Chew 1 tablet Daily.       atorvastatin 20 MG tablet  Commonly known as: LIPITOR      Take 1 tablet by mouth Daily.       clobetasol 0.05 % external solution  Commonly known as: TEMOVATE           coenzyme Q10 100 MG capsule      Take 1 capsule by mouth Daily.       GLUCOSAMINE CHONDR COMPLEX PO      Take  by mouth Daily. 7609-8360 mg       ketoconazole 2 % shampoo  Commonly known as: NIZORAL           losartan 100 MG tablet  Commonly known as: COZAAR      TAKE 1 TABLET BY MOUTH DAILY       MULTIVITAMIN GUMMIES ADULT PO      Take 1 tablet by mouth Daily.       OMEGA-3 FISH OIL PO      Take  by mouth Daily.       vitamin D3 125 MCG (5000 UT) capsule capsule      Take 1 capsule by mouth Daily.                  As always, it has been a pleasure to participate in your patient's care.      Sincerely,     Nicolle TARIQ

## 2025-02-03 ENCOUNTER — TELEPHONE (OUTPATIENT)
Dept: CARDIOLOGY | Facility: HOSPITAL | Age: 60
End: 2025-02-03

## 2025-02-03 NOTE — TELEPHONE ENCOUNTER
Left VM for PT. Informed PT to arrive 30 mins prior to scheduled time. PT having CCTA for pulmonary HTN and to r/o ASD, so no BB needed. PT does not appear to have CKD, does not take Metformin and is under the age of 62 so labs will not be required.

## 2025-02-05 ENCOUNTER — HOSPITAL ENCOUNTER (OUTPATIENT)
Dept: CT IMAGING | Facility: HOSPITAL | Age: 60
Discharge: HOME | End: 2025-02-05
Payer: COMMERCIAL

## 2025-02-05 ENCOUNTER — HOSPITAL ENCOUNTER (OUTPATIENT)
Dept: CARDIOLOGY | Facility: HOSPITAL | Age: 60
Discharge: HOME | End: 2025-02-05
Payer: COMMERCIAL

## 2025-02-05 ENCOUNTER — HOSPITAL ENCOUNTER (OUTPATIENT)
Dept: MAMMOGRAPHY | Facility: HOSPITAL | Age: 60
Discharge: HOME | End: 2025-02-05
Payer: COMMERCIAL

## 2025-02-05 VITALS — SYSTOLIC BLOOD PRESSURE: 117 MMHG | DIASTOLIC BLOOD PRESSURE: 74 MMHG | RESPIRATION RATE: 16 BRPM | HEART RATE: 88 BPM

## 2025-02-05 DIAGNOSIS — Q24.9 CONGENITAL HEART DISEASE WITH INTRACARDIAC SHUNTING: ICD-10-CM

## 2025-02-05 DIAGNOSIS — Z12.31 SCREENING MAMMOGRAM FOR BREAST CANCER: ICD-10-CM

## 2025-02-05 PROCEDURE — 77067 SCR MAMMO BI INCL CAD: CPT

## 2025-02-05 PROCEDURE — 36415 COLL VENOUS BLD VENIPUNCTURE: CPT

## 2025-02-05 PROCEDURE — 25510000001 IOPAMIDOL PER 1 ML: Performed by: INTERNAL MEDICINE

## 2025-02-05 PROCEDURE — 77063 BREAST TOMOSYNTHESIS BI: CPT

## 2025-02-05 PROCEDURE — 75574 CT ANGIO HRT W/3D IMAGE: CPT

## 2025-02-05 RX ORDER — IOPAMIDOL 755 MG/ML
100 INJECTION, SOLUTION INTRAVASCULAR
Status: COMPLETED | OUTPATIENT
Start: 2025-02-05 | End: 2025-02-05

## 2025-02-05 RX ORDER — NITROGLYCERIN 0.4 MG/1
0.8 TABLET SUBLINGUAL ONCE
Status: DISCONTINUED | OUTPATIENT
Start: 2025-02-05 | End: 2025-02-06 | Stop reason: HOSPADM

## 2025-02-05 RX ADMIN — IOPAMIDOL 100 ML: 755 INJECTION, SOLUTION INTRAVENOUS at 08:55

## 2025-02-05 NOTE — PROGRESS NOTES
PT VSS. HR 80-90's; however, no BB required. PT is having CT for pulmonary hypertension to R/O ASD. PT has 20g IV to RAC placed by PAMELA Jaimes tech. Ready for CCTA scan.

## 2025-02-07 ENCOUNTER — TRANSCRIBE ORDERS (OUTPATIENT)
Dept: CARDIOLOGY | Facility: CLINIC | Age: 60
End: 2025-02-07
Payer: COMMERCIAL

## 2025-02-07 DIAGNOSIS — Z01.810 PRE-OPERATIVE CARDIOVASCULAR EXAMINATION: Primary | ICD-10-CM

## 2025-02-07 DIAGNOSIS — Q21.10 ATRIAL SEPTAL DEFECT: Primary | ICD-10-CM

## 2025-02-07 DIAGNOSIS — Z13.6 SCREENING FOR ISCHEMIC HEART DISEASE: ICD-10-CM

## 2025-02-07 NOTE — PROGRESS NOTES
I called the patient to discuss eh results. She hasn't answered x 2. I left a VM. She needs a SYDNEY. Merecdes, I'm going to do it with dr mendoza to make sure I get all the right images, he is available on M, T, or Wednesdays in the PACU so if you get a hold of her to schedule it then that would be great

## 2025-02-13 ENCOUNTER — PATIENT MESSAGE (OUTPATIENT)
Dept: INTERNAL MEDICINE | Facility: CLINIC | Age: 60
End: 2025-02-13
Payer: COMMERCIAL

## 2025-02-18 ENCOUNTER — LAB (OUTPATIENT)
Dept: LAB | Facility: HOSPITAL | Age: 60
End: 2025-02-18
Payer: COMMERCIAL

## 2025-02-18 DIAGNOSIS — Z01.810 PRE-OPERATIVE CARDIOVASCULAR EXAMINATION: ICD-10-CM

## 2025-02-18 DIAGNOSIS — Z13.6 SCREENING FOR ISCHEMIC HEART DISEASE: ICD-10-CM

## 2025-02-18 LAB
ANION GAP SERPL CALCULATED.3IONS-SCNC: 11.3 MMOL/L (ref 5–15)
BASOPHILS # BLD AUTO: 0.05 10*3/MM3 (ref 0–0.2)
BASOPHILS NFR BLD AUTO: 0.6 % (ref 0–1.5)
BUN SERPL-MCNC: 17 MG/DL (ref 6–20)
BUN/CREAT SERPL: 19.8 (ref 7–25)
CALCIUM SPEC-SCNC: 10 MG/DL (ref 8.6–10.5)
CHLORIDE SERPL-SCNC: 100 MMOL/L (ref 98–107)
CO2 SERPL-SCNC: 27.7 MMOL/L (ref 22–29)
CREAT SERPL-MCNC: 0.86 MG/DL (ref 0.57–1)
DEPRECATED RDW RBC AUTO: 40.4 FL (ref 37–54)
EGFRCR SERPLBLD CKD-EPI 2021: 78.9 ML/MIN/1.73
EOSINOPHIL # BLD AUTO: 0.34 10*3/MM3 (ref 0–0.4)
EOSINOPHIL NFR BLD AUTO: 3.9 % (ref 0.3–6.2)
ERYTHROCYTE [DISTWIDTH] IN BLOOD BY AUTOMATED COUNT: 12 % (ref 12.3–15.4)
GLUCOSE SERPL-MCNC: 131 MG/DL (ref 65–99)
HCT VFR BLD AUTO: 42.8 % (ref 34–46.6)
HGB BLD-MCNC: 14.6 G/DL (ref 12–15.9)
IMM GRANULOCYTES # BLD AUTO: 0.03 10*3/MM3 (ref 0–0.05)
IMM GRANULOCYTES NFR BLD AUTO: 0.3 % (ref 0–0.5)
LYMPHOCYTES # BLD AUTO: 2.69 10*3/MM3 (ref 0.7–3.1)
LYMPHOCYTES NFR BLD AUTO: 31.1 % (ref 19.6–45.3)
MCH RBC QN AUTO: 31.5 PG (ref 26.6–33)
MCHC RBC AUTO-ENTMCNC: 34.1 G/DL (ref 31.5–35.7)
MCV RBC AUTO: 92.2 FL (ref 79–97)
MONOCYTES # BLD AUTO: 0.55 10*3/MM3 (ref 0.1–0.9)
MONOCYTES NFR BLD AUTO: 6.4 % (ref 5–12)
NEUTROPHILS NFR BLD AUTO: 4.98 10*3/MM3 (ref 1.7–7)
NEUTROPHILS NFR BLD AUTO: 57.7 % (ref 42.7–76)
NRBC BLD AUTO-RTO: 0 /100 WBC (ref 0–0.2)
PLATELET # BLD AUTO: 378 10*3/MM3 (ref 140–450)
PMV BLD AUTO: 9.3 FL (ref 6–12)
POTASSIUM SERPL-SCNC: 3.9 MMOL/L (ref 3.5–5.2)
RBC # BLD AUTO: 4.64 10*6/MM3 (ref 3.77–5.28)
SODIUM SERPL-SCNC: 139 MMOL/L (ref 136–145)
WBC NRBC COR # BLD AUTO: 8.64 10*3/MM3 (ref 3.4–10.8)

## 2025-02-18 PROCEDURE — 80048 BASIC METABOLIC PNL TOTAL CA: CPT

## 2025-02-18 PROCEDURE — 85025 COMPLETE CBC W/AUTO DIFF WBC: CPT

## 2025-02-18 PROCEDURE — 36415 COLL VENOUS BLD VENIPUNCTURE: CPT

## 2025-02-19 ENCOUNTER — HOSPITAL ENCOUNTER (OUTPATIENT)
Dept: POSTOP/PACU | Facility: HOSPITAL | Age: 60
Discharge: HOME | End: 2025-02-19
Payer: COMMERCIAL

## 2025-02-19 ENCOUNTER — ANESTHESIA EVENT (OUTPATIENT)
Dept: POSTOP/PACU | Facility: HOSPITAL | Age: 60
End: 2025-02-19
Payer: COMMERCIAL

## 2025-02-19 ENCOUNTER — ANESTHESIA (OUTPATIENT)
Dept: POSTOP/PACU | Facility: HOSPITAL | Age: 60
End: 2025-02-19
Payer: COMMERCIAL

## 2025-02-19 VITALS
DIASTOLIC BLOOD PRESSURE: 57 MMHG | OXYGEN SATURATION: 91 % | HEART RATE: 76 BPM | RESPIRATION RATE: 16 BRPM | SYSTOLIC BLOOD PRESSURE: 103 MMHG

## 2025-02-19 VITALS — DIASTOLIC BLOOD PRESSURE: 94 MMHG | OXYGEN SATURATION: 97 % | HEART RATE: 80 BPM | SYSTOLIC BLOOD PRESSURE: 114 MMHG

## 2025-02-19 DIAGNOSIS — Q21.10 ATRIAL SEPTAL DEFECT: ICD-10-CM

## 2025-02-19 LAB — BH CV ECHO SHUNT ASSESSMENT PERFORMED (HIDDEN SCRIPTING): 1

## 2025-02-19 PROCEDURE — 93325 DOPPLER ECHO COLOR FLOW MAPG: CPT

## 2025-02-19 PROCEDURE — 25010000002 LIDOCAINE 2% SOLUTION: Performed by: NURSE ANESTHETIST, CERTIFIED REGISTERED

## 2025-02-19 PROCEDURE — 93312 ECHO TRANSESOPHAGEAL: CPT

## 2025-02-19 PROCEDURE — 25810000003 LACTATED RINGERS PER 1000 ML: Performed by: NURSE ANESTHETIST, CERTIFIED REGISTERED

## 2025-02-19 PROCEDURE — 93321 DOPPLER ECHO F-UP/LMTD STD: CPT

## 2025-02-19 PROCEDURE — 25010000002 PROPOFOL 10 MG/ML EMULSION: Performed by: NURSE ANESTHETIST, CERTIFIED REGISTERED

## 2025-02-19 RX ORDER — MIDAZOLAM HYDROCHLORIDE 1 MG/ML
1 INJECTION, SOLUTION INTRAMUSCULAR; INTRAVENOUS
Status: DISCONTINUED | OUTPATIENT
Start: 2025-02-19 | End: 2025-02-20 | Stop reason: HOSPADM

## 2025-02-19 RX ORDER — FAMOTIDINE 10 MG/ML
20 INJECTION, SOLUTION INTRAVENOUS ONCE
Status: DISCONTINUED | OUTPATIENT
Start: 2025-02-19 | End: 2025-02-20 | Stop reason: HOSPADM

## 2025-02-19 RX ORDER — SODIUM CHLORIDE 0.9 % (FLUSH) 0.9 %
3 SYRINGE (ML) INJECTION EVERY 12 HOURS SCHEDULED
Status: DISCONTINUED | OUTPATIENT
Start: 2025-02-19 | End: 2025-02-20 | Stop reason: HOSPADM

## 2025-02-19 RX ORDER — LIDOCAINE HYDROCHLORIDE 20 MG/ML
INJECTION, SOLUTION INFILTRATION; PERINEURAL AS NEEDED
Status: DISCONTINUED | OUTPATIENT
Start: 2025-02-19 | End: 2025-02-19 | Stop reason: SURG

## 2025-02-19 RX ORDER — SODIUM CHLORIDE 0.9 % (FLUSH) 0.9 %
3-10 SYRINGE (ML) INJECTION AS NEEDED
Status: DISCONTINUED | OUTPATIENT
Start: 2025-02-19 | End: 2025-02-20 | Stop reason: HOSPADM

## 2025-02-19 RX ORDER — FENTANYL CITRATE 50 UG/ML
50 INJECTION, SOLUTION INTRAMUSCULAR; INTRAVENOUS ONCE AS NEEDED
Status: DISCONTINUED | OUTPATIENT
Start: 2025-02-19 | End: 2025-02-20 | Stop reason: HOSPADM

## 2025-02-19 RX ORDER — UBIDECARENONE 75 MG
100 CAPSULE ORAL DAILY
COMMUNITY

## 2025-02-19 RX ORDER — PROPOFOL 10 MG/ML
VIAL (ML) INTRAVENOUS AS NEEDED
Status: DISCONTINUED | OUTPATIENT
Start: 2025-02-19 | End: 2025-02-19 | Stop reason: SURG

## 2025-02-19 RX ORDER — SODIUM CHLORIDE, SODIUM LACTATE, POTASSIUM CHLORIDE, CALCIUM CHLORIDE 600; 310; 30; 20 MG/100ML; MG/100ML; MG/100ML; MG/100ML
9 INJECTION, SOLUTION INTRAVENOUS CONTINUOUS
Status: DISCONTINUED | OUTPATIENT
Start: 2025-02-19 | End: 2025-02-20 | Stop reason: HOSPADM

## 2025-02-19 RX ORDER — SODIUM CHLORIDE, SODIUM LACTATE, POTASSIUM CHLORIDE, CALCIUM CHLORIDE 600; 310; 30; 20 MG/100ML; MG/100ML; MG/100ML; MG/100ML
INJECTION, SOLUTION INTRAVENOUS CONTINUOUS PRN
Status: DISCONTINUED | OUTPATIENT
Start: 2025-02-19 | End: 2025-02-19 | Stop reason: SURG

## 2025-02-19 RX ORDER — LIDOCAINE HYDROCHLORIDE 10 MG/ML
0.5 INJECTION, SOLUTION INFILTRATION; PERINEURAL ONCE AS NEEDED
Status: DISCONTINUED | OUTPATIENT
Start: 2025-02-19 | End: 2025-02-20 | Stop reason: HOSPADM

## 2025-02-19 RX ADMIN — LIDOCAINE HYDROCHLORIDE 60 MG: 20 INJECTION, SOLUTION INFILTRATION; PERINEURAL at 07:23

## 2025-02-19 RX ADMIN — PROPOFOL 30 MG: 10 INJECTION, EMULSION INTRAVENOUS at 07:29

## 2025-02-19 RX ADMIN — PROPOFOL 20 MG: 10 INJECTION, EMULSION INTRAVENOUS at 07:32

## 2025-02-19 RX ADMIN — TOPICAL ANESTHETIC 1 SPRAY: 200 SPRAY DENTAL; PERIODONTAL at 07:26

## 2025-02-19 RX ADMIN — PROPOFOL 20 MG: 10 INJECTION, EMULSION INTRAVENOUS at 07:26

## 2025-02-19 RX ADMIN — PROPOFOL 20 MG: 10 INJECTION, EMULSION INTRAVENOUS at 07:34

## 2025-02-19 RX ADMIN — SODIUM CHLORIDE, SODIUM LACTATE, POTASSIUM CHLORIDE, AND CALCIUM CHLORIDE: 600; 310; 30; 20 INJECTION, SOLUTION INTRAVENOUS at 07:18

## 2025-02-19 RX ADMIN — PROPOFOL 70 MG: 10 INJECTION, EMULSION INTRAVENOUS at 07:23

## 2025-02-19 RX ADMIN — PROPOFOL 20 MG: 10 INJECTION, EMULSION INTRAVENOUS at 07:35

## 2025-02-19 NOTE — ANESTHESIA PREPROCEDURE EVALUATION
Anesthesia Evaluation     Patient summary reviewed   no history of anesthetic complications:   NPO Solid Status: > 8 hours  NPO Liquid Status: > 2 hours           Airway   Mallampati: II  TM distance: >3 FB  Neck ROM: full  No difficulty expected  Dental      Pulmonary     breath sounds clear to auscultation  (-) shortness of breath, recent URI, not a smoker  Cardiovascular   Exercise tolerance: good (4-7 METS)    ECG reviewed  Rhythm: regular  Rate: normal    (+) hypertension, valvular problems/murmurs (Large ASD w/ PHTN (RVSP 54)) murmur, hyperlipidemia  (-) past MI, dysrhythmias, angina    ROS comment: CT CATH IMPRESSION:  1. Total calcium score 0 indicating absence of calcified coronary plaque.  2. Severe cardiac motion artifact limits assessment of coronary arteries, no obvious atherosclerosis, no obvious proximal vessel stenosis.  CAD RADS N.  Please note that this study was not protocoled for coronary evaluation.  3. Suspect a large secundum ASD measuring 3.0 x 1.8 cm.  Full visualization limited by lack of contrast separation between the left and right atria, although there appears to be a paucity/absence of rim tissue inferiorly.    4. Severe main pulmonary artery dilatation (4.0 cm) suggesting significant underlying pulmonary hypertension.  5. RV appears moderate to severely dilated and moderately hypertrophied.  6. Please refer to the radiology report for information on extra-cardiac structures.       RECOMMENDATION:  1. Consider further evaluation with SYDNEY and/or cardiac MRI if clinically indicated to help guide feasibility of ASD repair.    11/2024 ECHO - Interpretation Summary  ·  Left ventricular systolic function is normal. Calculated left ventricular EF = 63.9%  ·  Left ventricular diastolic function was normal.  ·  The right ventricular cavity is moderate to severely dilated.  ·  Saline test results are negative.  ·  The right atrial cavity is moderately  dilated.  ·  Estimated right ventricular  systolic pressure from tricuspid regurgitation is moderately elevated (45-55 mmHg). Calculated right ventricular systolic pressure from tricuspid regurgitation is 54 mmHg.  ·  Moderate pulmonary hypertension is present.        Neuro/Psych  (+) tremors, weakness  (-) seizures, CVA  GI/Hepatic/Renal/Endo    (+) obesityDiabetes: IFG.    Musculoskeletal     (+) back pain  (-) neck stiffness  Abdominal    Substance History      OB/GYN          Other                          Anesthesia Plan    ASA 3     MAC     (MAC anesthesia discussed with patient and/or patient representative. Risks (including but not limited to intra-op awareness), benefits, and alternatives were discussed. Understanding was voiced with an agreement to proceed with a MAC technique and General as a backup option. )    Anesthetic plan, risks, benefits, and alternatives have been provided, discussed and informed consent has been obtained with: patient.        CODE STATUS:

## 2025-02-19 NOTE — ANESTHESIA POSTPROCEDURE EVALUATION
Patient: Felipa Stern    Procedure Summary       Date: 02/19/25 Room / Location: [facility] PACU    Anesthesia Start: 0713 Anesthesia Stop: 0740    Procedure: ADULT TRANSESOPHAGEAL ECHO (SYDNEY) W/ CONT IF NECESSARY PER PROTOCOL Diagnosis:       Atrial septal defect      (Guidance for Cardiac Intervention)    Scheduled Providers: Martinez Castellanos Jr., MD Provider: Cristhian Bowles DO    Anesthesia Type: MAC ASA Status: 3            Anesthesia Type: MAC    Vitals  Vitals Value Taken Time   /50 02/19/25 0800   Temp     Pulse 75 02/19/25 0809   Resp 16 02/19/25 0800   SpO2 93 % 02/19/25 0809   Vitals shown include unfiled device data.        Post Anesthesia Care and Evaluation    Patient location during evaluation: bedside  Patient participation: complete - patient participated  Level of consciousness: awake and alert  Pain management: adequate    Airway patency: patent  Anesthetic complications: No anesthetic complications  PONV Status: controlled  Cardiovascular status: acceptable and hemodynamically stable  Respiratory status: acceptable, spontaneous ventilation and nonlabored ventilation  Hydration status: acceptable    Comments: /50   Pulse 75   Resp 16   SpO2 95%

## 2025-02-20 ENCOUNTER — TELEPHONE (OUTPATIENT)
Age: 60
End: 2025-02-20
Payer: COMMERCIAL

## 2025-02-20 NOTE — TELEPHONE ENCOUNTER
----- Message from Sierra Schmitt sent at 2/19/2025  5:16 PM EST -----  Can you all(Vangie) see this lady for ASD occlusion?   Thanks  b  ----- Message -----  From: Seirra Schmitt MD  Sent: 2/19/2025   5:16 PM EST  To: Sierra Schmitt MD

## 2025-02-24 ENCOUNTER — TELEPHONE (OUTPATIENT)
Dept: CARDIOLOGY | Age: 60
End: 2025-02-24

## 2025-02-24 NOTE — TELEPHONE ENCOUNTER
See below.    When you get a chance can you call an get her setup with Dr. Vences for a New Patient ASD Consult, Dr. Schmitt referring. She can be put in either a New Patient slot or one of the Structural Hold places.    Thanks,  Karla

## 2025-02-24 NOTE — TELEPHONE ENCOUNTER
2/24/25 Left message for patient to call back to schedule new patient appointment with Dr. Vences.

## 2025-02-24 NOTE — TELEPHONE ENCOUNTER
Caller: Felipa Stern    Relationship: Self    Best call back number: [phone]    Who are you requesting to speak with (clinical staff, provider,  specific staff member): CLINICAL  SURGERY    Do you know the name of the person who called: DR CLARK OFFICE    What was the call regarding: PATIENT STATED THAT SHE MISSED A CALL TO SCHEDULE A SURGERY WITH DR JEAN BAPTISTE. PATIENT IS CURRENTLY SEEING DR LOAIZA AND HAS ASD.

## 2025-03-03 NOTE — TELEPHONE ENCOUNTER
Caller: Felipa Stern    Relationship to patient: Self    Best call back number: [phone]    Patient is needing: PT RETURNING CALL. PLEASE CALL BACK. OFF WORK ON WED'S AND LUNCH USUALLY BETWEEN 1:30PM AND 2:30PM. THOSE ARE THE BEST TIMES TO REACH PT.

## 2025-03-05 NOTE — TELEPHONE ENCOUNTER
There are multiple encounters regarding this topic.  Please see the following for ongoing updates:  Telephone with Karla Durand CMA (02/20/2025)     Thank you,  Kimmy GOMEZ RN  Triage Nurse [facility]  03/05/25 12:55 EST

## 2025-03-05 NOTE — TELEPHONE ENCOUNTER
Chart review shows Felipa Stern has called back however a new encounter was created:      Returned call to Felipa Stern.  Utilized structural hold slot as approved by Karla.  Patient is agreeable to see Dr. Vences on 3/24 at 10:20 am.  Reviewed office address with patient and she verbalized understanding.    Thank you,  Kimmy GOMEZ RN  Triage Nurse [facility]  03/05/25 12:54 EST

## 2025-03-19 ENCOUNTER — OFFICE VISIT (OUTPATIENT)
Dept: INTERNAL MEDICINE | Facility: CLINIC | Age: 60
End: 2025-03-19
Payer: COMMERCIAL

## 2025-03-19 ENCOUNTER — LAB (OUTPATIENT)
Facility: HOSPITAL | Age: 60
End: 2025-03-19
Payer: COMMERCIAL

## 2025-03-19 VITALS
HEIGHT: 64 IN | SYSTOLIC BLOOD PRESSURE: 124 MMHG | OXYGEN SATURATION: 94 % | DIASTOLIC BLOOD PRESSURE: 78 MMHG | WEIGHT: 187 LBS | BODY MASS INDEX: 31.92 KG/M2 | HEART RATE: 83 BPM

## 2025-03-19 DIAGNOSIS — I10 ESSENTIAL HYPERTENSION: ICD-10-CM

## 2025-03-19 DIAGNOSIS — Z00.00 ANNUAL PHYSICAL EXAM: Primary | ICD-10-CM

## 2025-03-19 DIAGNOSIS — Q21.10 ASD (ATRIAL SEPTAL DEFECT): Chronic | ICD-10-CM

## 2025-03-19 DIAGNOSIS — L21.0 DANDRUFF: ICD-10-CM

## 2025-03-19 DIAGNOSIS — Z01.84 IMMUNITY TO MEASLES DETERMINED BY SEROLOGIC TEST: ICD-10-CM

## 2025-03-19 DIAGNOSIS — G47.30 SLEEP APNEA, UNSPECIFIED TYPE: ICD-10-CM

## 2025-03-19 DIAGNOSIS — I27.20 PULMONARY HTN: Chronic | ICD-10-CM

## 2025-03-19 DIAGNOSIS — R73.03 PREDIABETES: Chronic | ICD-10-CM

## 2025-03-19 DIAGNOSIS — E78.2 MIXED HYPERLIPIDEMIA: Chronic | ICD-10-CM

## 2025-03-19 PROBLEM — L40.9 PSORIASIS: Status: RESOLVED | Noted: 2022-08-17 | Resolved: 2025-03-19

## 2025-03-19 LAB
ALBUMIN SERPL-MCNC: 4.2 G/DL (ref 3.5–5.2)
ALBUMIN/GLOB SERPL: 1.2 G/DL
ALP SERPL-CCNC: 117 U/L (ref 39–117)
ALT SERPL W P-5'-P-CCNC: 20 U/L (ref 1–33)
ANION GAP SERPL CALCULATED.3IONS-SCNC: 8.3 MMOL/L (ref 5–15)
AST SERPL-CCNC: 22 U/L (ref 1–32)
BASOPHILS # BLD MANUAL: 0.08 10*3/MM3 (ref 0–0.2)
BASOPHILS NFR BLD MANUAL: 1 % (ref 0–1.5)
BILIRUB SERPL-MCNC: 0.5 MG/DL (ref 0–1.2)
BUN SERPL-MCNC: 16 MG/DL (ref 6–20)
BUN/CREAT SERPL: 18.6 (ref 7–25)
CALCIUM SPEC-SCNC: 10.2 MG/DL (ref 8.6–10.5)
CHLORIDE SERPL-SCNC: 105 MMOL/L (ref 98–107)
CHOLEST SERPL-MCNC: 180 MG/DL (ref 0–200)
CO2 SERPL-SCNC: 26.7 MMOL/L (ref 22–29)
CREAT SERPL-MCNC: 0.86 MG/DL (ref 0.57–1)
DEPRECATED RDW RBC AUTO: 42.8 FL (ref 37–54)
EGFRCR SERPLBLD CKD-EPI 2021: 78.9 ML/MIN/1.73
EOSINOPHIL # BLD MANUAL: 0.25 10*3/MM3 (ref 0–0.4)
EOSINOPHIL NFR BLD MANUAL: 3 % (ref 0.3–6.2)
ERYTHROCYTE [DISTWIDTH] IN BLOOD BY AUTOMATED COUNT: 12.3 % (ref 12.3–15.4)
GLOBULIN UR ELPH-MCNC: 3.6 GM/DL
GLUCOSE SERPL-MCNC: 130 MG/DL (ref 65–99)
HBA1C MFR BLD: 5.9 % (ref 4.8–5.6)
HCT VFR BLD AUTO: 43.5 % (ref 34–46.6)
HDLC SERPL-MCNC: 58 MG/DL (ref 40–60)
HGB BLD-MCNC: 14.5 G/DL (ref 12–15.9)
LDLC SERPL CALC-MCNC: 102 MG/DL (ref 0–100)
LDLC/HDLC SERPL: 1.71 {RATIO}
LYMPHOCYTES # BLD MANUAL: 3.17 10*3/MM3 (ref 0.7–3.1)
LYMPHOCYTES NFR BLD MANUAL: 7 % (ref 5–12)
MCH RBC QN AUTO: 31.7 PG (ref 26.6–33)
MCHC RBC AUTO-ENTMCNC: 33.3 G/DL (ref 31.5–35.7)
MCV RBC AUTO: 95 FL (ref 79–97)
MONOCYTES # BLD: 0.58 10*3/MM3 (ref 0.1–0.9)
NEUTROPHILS # BLD AUTO: 4.25 10*3/MM3 (ref 1.7–7)
NEUTROPHILS NFR BLD MANUAL: 51 % (ref 42.7–76)
PLAT MORPH BLD: NORMAL
PLATELET # BLD AUTO: 362 10*3/MM3 (ref 140–450)
PMV BLD AUTO: 9.9 FL (ref 6–12)
POTASSIUM SERPL-SCNC: 5.1 MMOL/L (ref 3.5–5.2)
PROT SERPL-MCNC: 7.8 G/DL (ref 6–8.5)
RBC # BLD AUTO: 4.58 10*6/MM3 (ref 3.77–5.28)
RBC MORPH BLD: NORMAL
SODIUM SERPL-SCNC: 140 MMOL/L (ref 136–145)
TRIGL SERPL-MCNC: 114 MG/DL (ref 0–150)
TSH SERPL DL<=0.05 MIU/L-ACNC: 1.44 UIU/ML (ref 0.27–4.2)
VARIANT LYMPHS NFR BLD MANUAL: 38 % (ref 19.6–45.3)
VLDLC SERPL-MCNC: 20 MG/DL (ref 5–40)
WBC MORPH BLD: NORMAL
WBC NRBC COR # BLD AUTO: 8.34 10*3/MM3 (ref 3.4–10.8)

## 2025-03-19 PROCEDURE — 85007 BL SMEAR W/DIFF WBC COUNT: CPT | Performed by: NURSE PRACTITIONER

## 2025-03-19 PROCEDURE — 83036 HEMOGLOBIN GLYCOSYLATED A1C: CPT | Performed by: NURSE PRACTITIONER

## 2025-03-19 PROCEDURE — 80050 GENERAL HEALTH PANEL: CPT | Performed by: NURSE PRACTITIONER

## 2025-03-19 PROCEDURE — 36415 COLL VENOUS BLD VENIPUNCTURE: CPT

## 2025-03-19 PROCEDURE — 86762 RUBELLA ANTIBODY: CPT

## 2025-03-19 PROCEDURE — 80061 LIPID PANEL: CPT | Performed by: NURSE PRACTITIONER

## 2025-03-19 PROCEDURE — 86735 MUMPS ANTIBODY: CPT

## 2025-03-19 PROCEDURE — 86765 RUBEOLA ANTIBODY: CPT

## 2025-03-19 RX ORDER — FLUOCINONIDE GEL 0.5 MG/G
GEL TOPICAL
COMMUNITY
Start: 2025-03-14

## 2025-03-19 NOTE — PROGRESS NOTES
"Chief Complaint  Annual Exam    Subjective        Felipa Stern presents to [facility]  History of Present Illness  This is a 60 y/o female presenting to office for CPE and chronic health conditions    Pap smear UTD 12/2022; due dec 2025  Mammogram UTD 2/2025  Colonoscopy UTD 2018; due in 2028    Following with Dr. Styles for lesion on tongue-- recommended lidex topically. Reports hx of geographic tongue.     Continues following with cardiology, pulmonary-- was found to large ASD on recent testing; will have f/u on 3/24/25.     Continues on CPAP QHS for sleep apnea; reports improvement in her fatigue.     HTN-- continues amlodipine, losartan; denies CP or SOA.     HLD-- continues on statin therapy; reports she is working on starting walking exercise regimen; reports following healthy diet.     Objective   Vital Signs:  /78 (BP Location: Left arm, Patient Position: Sitting, Cuff Size: Adult)   Pulse 83   Ht 162.6 cm (64\")   Wt 84.8 kg (187 lb)   SpO2 94%   BMI 32.10 kg/m²   Estimated body mass index is 32.1 kg/m² as calculated from the following:    Height as of this encounter: 162.6 cm (64\").    Weight as of this encounter: 84.8 kg (187 lb).            Physical Exam  Constitutional:       General: She is awake.      Appearance: Normal appearance. She is obese.   HENT:      Head: Normocephalic and atraumatic.      Right Ear: Hearing, tympanic membrane, ear canal and external ear normal.      Left Ear: Hearing, tympanic membrane, ear canal and external ear normal.      Nose: Nose normal.      Mouth/Throat:      Lips: Pink.      Mouth: Mucous membranes are moist.      Pharynx: Oropharynx is clear.   Eyes:      Extraocular Movements: Extraocular movements intact.      Conjunctiva/sclera: Conjunctivae normal.      Pupils: Pupils are equal, round, and reactive to light.      Comments: +glasses   Cardiovascular:      Rate and Rhythm: Normal rate and regular rhythm.      Pulses: " Normal pulses.      Heart sounds: Murmur heard.      Systolic murmur is present with a grade of 2/6.      No gallop.   Pulmonary:      Effort: Pulmonary effort is normal. No respiratory distress.      Breath sounds: Normal breath sounds. No wheezing or rales.   Abdominal:      General: Abdomen is flat. Bowel sounds are normal. There is no distension.      Palpations: Abdomen is soft.      Tenderness: There is no abdominal tenderness.   Musculoskeletal:         General: No swelling.      Cervical back: Normal range of motion and neck supple.   Skin:     General: Skin is warm and dry.      Capillary Refill: Capillary refill takes less than 2 seconds.   Neurological:      General: No focal deficit present.      Mental Status: She is alert and oriented to person, place, and time. Mental status is at baseline.      Motor: Motor function is intact.      Coordination: Coordination is intact.      Gait: Gait is intact.      Deep Tendon Reflexes: Reflexes are normal and symmetric.   Psychiatric:         Attention and Perception: Attention normal.         Mood and Affect: Mood normal.         Speech: Speech normal.         Behavior: Behavior normal. Behavior is cooperative.         Thought Content: Thought content normal.         Cognition and Memory: Cognition normal.         Judgment: Judgment normal.        Result Review :  The following data was reviewed by: CHANDNI Miller on 03/19/2025:  Common labs          11/20/2024    10:56 11/27/2024    10:22 11/27/2024    10:25 11/27/2024    10:30 11/27/2024    10:31 11/27/2024    10:32 11/27/2024    10:34 11/27/2024    10:38 2/18/2025    15:54   Common Labs   Glucose 105         131    BUN 17         17    Creatinine 0.81         0.86    Sodium 136         139    Potassium 4.3         3.9    Chloride 102         100    Calcium 9.7         10.0    WBC 7.73         8.64    Hemoglobin 14.5  13.3  13.3  12.9  13.3  13.3  13.3  12.9  14.6    Hematocrit 44.1  39  39  38  39  39  39   38  42.8    Platelets 352         378      Tobacco Use: Low Risk  (3/19/2025)    Patient History     Smoking Tobacco Use: Never     Smokeless Tobacco Use: Never     Passive Exposure: Not on file     Social History     Substance and Sexual Activity   Alcohol Use Yes    Alcohol/week: 6.0 standard drinks of alcohol    Types: 4 Glasses of wine, 1 Cans of beer, 1 Shots of liquor per week    Comment: occasional     Family History   Problem Relation Age of Onset    Lung cancer Mother          age 72    Cancer Mother         Lung    Coronary artery disease Father     Thyroid disease Father     Heart disease Father         Triple bypass age 70    Vision loss Father         Suspected, onset age 92    Diabetes Father         Onset age 92    Osteoporosis Other     Heart disease Maternal Grandmother         Fatal heart attack at age 81.    Stroke Maternal Uncle     Stroke Maternal Uncle       Adult Transesophageal Echo ([facility]) W/ Cont if Necessary Per Protocol (2025 8:22 AM)  CT Angiogram Coronary-Cardiology Interpretation (2025 8:57 AM)  CT Angiogram Coronary (2025 8:54 AM)  Cardiac Catheterization/Vascular Study (2024 10:36 AM)  Adult Transthoracic Echo Complete w/ Color, Spectral and Contrast if Necessary Per Protocol (2024 12:28 PM)  Treadmill Stress Test (2024 12:50 PM)         Assessment and Plan   Diagnoses and all orders for this visit:    1. Annual physical exam (Primary)  Assessment & Plan:  Current recommendations according to the current Physical Activity Guidelines for Americans: adults need 150-300 minutes of physical exercise weekly. It is also recommended to perform two sessions of full body strength training exercise weekly which includes all major muscle groups including legs, hips, back, abdomen, chest, shoulders, and arms.   Current CDC recommendations for diet include following a diet that emphasizes fruits, vegetables, whole grains that is low in saturated fats and low  in sugar intake.   Adults should consume at least 3 cup equivalents of fruit and vegetables daily. It is also beneficial to get 25 grams of fiber daily unless told otherwise by your healthcare provider.   Labs today  Mammogram UTD 2025  Pap smear due with next CPE in 2026  Colonoscopy due in 2028  Anticipatory guidance given regarding health prevention/wellness, diet/exercise, tobacco/alcohol/drug education, exercise and wellbeing, covid 19 guidance, vaccination recommendations, and sexual health/STD education.   Recommended bi-yearly dental exams and regular vision examinations.         Orders:  -     Cancel: TSH Rfx On Abnormal To Free T4  -     TSH Rfx On Abnormal To Free T4    2. ASD (atrial septal defect)  Assessment & Plan:  Clinical Indication    Guidance for Cardiac Intervention - ASD   Dx: Atrial septal defect [Q21.10 (ICD-10-CM)]     Interpretation Summary         Left ventricular systolic function is normal. Left ventricular ejection fraction appears to be 61 - 65%.    The right ventricular cavity is moderate to severely dilated.    The right atrial cavity is severely  dilated.    Large secundum ASD  2/19/25  Following with cardiology       3. Essential hypertension  Assessment & Plan:  Hypertension is stable and controlled  Continue current treatment regimen.  Dietary sodium restriction.  Regular aerobic exercise.  Blood pressure will be reassessed in 6 months.  Continues on losartan, amlodipine    Orders:  -     Cancel: CBC & Differential  -     Cancel: Comprehensive Metabolic Panel  -     Comprehensive Metabolic Panel  -     CBC & Differential    4. Mixed hyperlipidemia  Assessment & Plan:  Recommend following a low saturated fat, low sugar diet and getting 150 minutes of weekly exercise.       Orders:  -     Cancel: Lipid Panel  -     Lipid Panel    5. Prediabetes  Assessment & Plan:  Continue with low glycemic diet choices including reducing refined carbohydrates and simple sugars in diet.  Recommended 150 minutes weekly exercise or as tolerated.       Orders:  -     Cancel: Hemoglobin A1c  -     Hemoglobin A1c    6. Pulmonary HTN  Assessment & Plan:  Continues following with cardiology  Seen by pulm  +ASD      7. Dandruff  Assessment & Plan:  Nizoral PRN  Seen by derm         8. Sleep apnea, unspecified type  Assessment & Plan:  Continues using CPAP QHS               Follow Up   Return in about 6 months (around 9/19/2025) for Recheck chronic conditions.  Patient was given instructions and counseling regarding her condition or for health maintenance advice. Please see specific information pulled into the AVS if appropriate.

## 2025-03-19 NOTE — ASSESSMENT & PLAN NOTE
Current recommendations according to the current Physical Activity Guidelines for Americans: adults need 150-300 minutes of physical exercise weekly. It is also recommended to perform two sessions of full body strength training exercise weekly which includes all major muscle groups including legs, hips, back, abdomen, chest, shoulders, and arms.   Current CDC recommendations for diet include following a diet that emphasizes fruits, vegetables, whole grains that is low in saturated fats and low in sugar intake.   Adults should consume at least 3 cup equivalents of fruit and vegetables daily. It is also beneficial to get 25 grams of fiber daily unless told otherwise by your healthcare provider.   Labs today  Mammogram UTD 2025  Pap smear due with next CPE in 2026  Colonoscopy due in 2028  Anticipatory guidance given regarding health prevention/wellness, diet/exercise, tobacco/alcohol/drug education, exercise and wellbeing, covid 19 guidance, vaccination recommendations, and sexual health/STD education.   Recommended bi-yearly dental exams and regular vision examinations.

## 2025-03-19 NOTE — ASSESSMENT & PLAN NOTE
Clinical Indication    Guidance for Cardiac Intervention - ASD   Dx: Atrial septal defect [Q21.10 (ICD-10-CM)]     Interpretation Summary         Left ventricular systolic function is normal. Left ventricular ejection fraction appears to be 61 - 65%.    The right ventricular cavity is moderate to severely dilated.    The right atrial cavity is severely  dilated.    Large secundum ASD  2/19/25  Following with cardiology

## 2025-03-19 NOTE — ASSESSMENT & PLAN NOTE
Hypertension is stable and controlled  Continue current treatment regimen.  Dietary sodium restriction.  Regular aerobic exercise.  Blood pressure will be reassessed in 6 months.  Continues on losartan, amlodipine

## 2025-03-20 DIAGNOSIS — E78.2 MIXED HYPERLIPIDEMIA: ICD-10-CM

## 2025-03-20 LAB
MEV IGG SER IA-ACNC: 135 AU/ML
MUV IGG SER IA-ACNC: 267 AU/ML
RUBV IGG SERPL IA-ACNC: 11.9 INDEX

## 2025-03-20 RX ORDER — ATORVASTATIN CALCIUM 20 MG/1
20 TABLET, FILM COATED ORAL DAILY
Qty: 90 TABLET | Refills: 1 | Status: SHIPPED | OUTPATIENT
Start: 2025-03-20

## 2025-03-24 ENCOUNTER — OFFICE VISIT (OUTPATIENT)
Age: 60
End: 2025-03-24
Payer: COMMERCIAL

## 2025-03-24 VITALS
HEART RATE: 62 BPM | DIASTOLIC BLOOD PRESSURE: 80 MMHG | BODY MASS INDEX: 32.44 KG/M2 | WEIGHT: 190 LBS | HEIGHT: 64 IN | SYSTOLIC BLOOD PRESSURE: 124 MMHG

## 2025-03-24 DIAGNOSIS — Q21.10 ATRIAL SEPTAL DEFECT: Primary | ICD-10-CM

## 2025-03-24 DIAGNOSIS — Q24.9 CONGENITAL HEART DISEASE WITH INTRACARDIAC SHUNTING: ICD-10-CM

## 2025-03-24 DIAGNOSIS — I51.7 RIGHT VENTRICULAR DILATION: ICD-10-CM

## 2025-03-24 DIAGNOSIS — I27.20 PULMONARY HYPERTENSION: ICD-10-CM

## 2025-03-24 DIAGNOSIS — R06.09 DOE (DYSPNEA ON EXERTION): ICD-10-CM

## 2025-03-24 PROCEDURE — 99214 OFFICE O/P EST MOD 30 MIN: CPT | Performed by: INTERNAL MEDICINE

## 2025-03-24 NOTE — H&P (VIEW-ONLY)
Felipa Stern  1965  Date of Office Visit: 03/24/25  Encounter Provider: Tolu Vences MD  Place of Service: [facility] CARDIOLOG[facility]      CHIEF COMPLAINT:  Secundum ASD    HISTORY OF PRESENT ILLNESS:  59-year-old patient of Dr. Sierra Schmitt presents to me for evaluation of atrial septal defect.  She was evaluated by Nicolle Aldana previously.  She has a medical history of essential hypertension and hyperlipidemia.  She underwent a prior treadmill stress test with moderately impaired exercise capacity and had a transthoracic echocardiogram which was done following that.  The transthoracic echocardiogram was done on 11/13/2024 and she was noted to have a moderately dilated right atrial cavity along with moderate dilation of her right ventricle.  She had evidence of hypertrophy and evidence of pressure and volume overload.  Her RVSP on evaluation was elevated at 53 mmHg.  She had a right heart catheterization following that showing moderate pulmonary hypertension with a PA pressure of 50/15/30.  Right atrial pressure was normal.  She had a shunt run which was performed with a significant step up and QP-QS that was elevated above 5.  She had a transesophageal echocardiogram which I reviewed.  This once again showed right atrial and right ventricular dilation and a secundum ASD.  Adequate superior, posterior rim.  Inferior rim looks acceptable.  Certainly not small.  Looks around 2 cm in diameter.  PVR is less than 3.  PA systolic is certainly less 2/3 patients systemic pressure at that time.    Review of Systems   Constitutional: Negative for fever and malaise/fatigue.   HENT:  Negative for nosebleeds and sore throat.    Eyes:  Negative for blurred vision and double vision.   Cardiovascular:  Negative for chest pain, claudication, palpitations and syncope.   Respiratory:  Negative for cough, shortness of breath and snoring.    Endocrine: Negative for cold intolerance, heat  intolerance and polydipsia.   Skin:  Negative for itching, poor wound healing and rash.   Musculoskeletal:  Negative for joint pain, joint swelling, muscle weakness and myalgias.   Gastrointestinal:  Negative for abdominal pain, melena, nausea and vomiting.   Neurological:  Negative for light-headedness, loss of balance, seizures, vertigo and weakness.   Psychiatric/Behavioral:  Negative for altered mental status and depression.        Past Medical History:   Diagnosis Date    Allergic 1990    Mostly mild, intermittent.    Allergic rhinitis     Back pain     Breast cyst     Class 1 obesity due to excess calories with serious comorbidity and body mass index (BMI) of 31.0 to 31.9 in adult 06/21/2023    Elevated blood sugar     Essential hypertension 02/23/2018    H/O mammogram     Hyperlipidemia     Pulmonary hypertension     Systolic murmur 09/20/2024    Tinnitus     Tremor     Visual impairment 1998?    Ocular pressure runs higer than normal.       The following portions of the patient's history were reviewed and updated as appropriate: Social history , Family history, and Surgical history     Current Outpatient Medications on File Prior to Visit   Medication Sig Dispense Refill    albuterol sulfate  (90 Base) MCG/ACT inhaler       amLODIPine (NORVASC) 5 MG tablet Take 1 tablet by mouth Daily. 90 tablet 3    aspirin 81 MG chewable tablet Chew 1 tablet Daily.      atorvastatin (LIPITOR) 20 MG tablet TAKE 1 TABLET BY MOUTH DAILY 90 tablet 1    clobetasol (TEMOVATE) 0.05 % external solution  (Patient taking differently: Apply  topically to the appropriate area as directed As Needed.)      coenzyme Q10 100 MG capsule Take 1 capsule by mouth Daily.      fluocinonide (LIDEX) 0.05 % gel       Glucosamine-Chondroitin (GLUCOSAMINE CHONDR COMPLEX PO) Take  by mouth Daily. 0281-4079 mg      ketoconazole (NIZORAL) 2 % shampoo       losartan (COZAAR) 100 MG tablet TAKE 1 TABLET BY MOUTH DAILY 90 tablet 1    Multiple  "Vitamins-Minerals (MULTIVITAMIN GUMMIES ADULT PO) Take 1 tablet by mouth Daily.      vitamin B-12 (CYANOCOBALAMIN) 100 MCG tablet Take 1 tablet by mouth Daily.      vitamin D3 125 MCG (5000 UT) capsule capsule Take 1 capsule by mouth Daily.       No current facility-administered medications on file prior to visit.       No Known Allergies    Vitals:    03/24/25 1042   Height: 162.6 cm (64\")     Body mass index is 32.1 kg/m².   Constitutional:       Appearance: Well-developed.   Eyes:      General: No scleral icterus.     Conjunctiva/sclera: Conjunctivae normal.   HENT:      Head: Normocephalic and atraumatic.   Neck:      Thyroid: No thyromegaly.      Vascular: Normal carotid pulses. No carotid bruit, hepatojugular reflux or JVD.      Trachea: No tracheal deviation.   Pulmonary:      Effort: No respiratory distress.      Breath sounds: Normal breath sounds. No decreased breath sounds. No wheezing. No rhonchi. No rales.   Chest:      Chest wall: Not tender to palpatation.   Cardiovascular:      Normal rate. Regular rhythm.      No gallop.    Pulses:     Carotid: 2+ bilaterally.     Radial: 2+ bilaterally.     Femoral: 2+ bilaterally.     Dorsalis pedis: 2+ bilaterally.     Posterior tibial: 2+ bilaterally.  Edema:     Peripheral edema absent.   Abdominal:      General: Bowel sounds are normal. There is no distension.      Palpations: Abdomen is soft.      Tenderness: There is no abdominal tenderness.   Musculoskeletal:         General: No deformity.      Cervical back: Normal range of motion and neck supple. Skin:     Findings: No erythema or rash.   Neurological:      Mental Status: Alert and oriented to person, place, and time.      Sensory: No sensory deficit.   Psychiatric:         Behavior: Behavior normal.         Lab Results   Component Value Date    WBC 8.34 03/19/2025    HGB 14.5 03/19/2025    HCT 43.5 03/19/2025    MCV 95.0 03/19/2025     03/19/2025       Lab Results   Component Value Date    GLUCOSE " 130 (H) 03/19/2025    BUN 16 03/19/2025    CREATININE 0.86 03/19/2025     03/19/2025    K 5.1 03/19/2025     03/19/2025    CALCIUM 10.2 03/19/2025    PROTEINTOT 7.8 03/19/2025    ALBUMIN 4.2 03/19/2025    ALT 20 03/19/2025    AST 22 03/19/2025    ALKPHOS 117 03/19/2025    BILITOT 0.5 03/19/2025    GLOB 3.6 03/19/2025    AGRATIO 1.2 03/19/2025    BCR 18.6 03/19/2025    ANIONGAP 8.3 03/19/2025    EGFR 78.9 03/19/2025       Lab Results   Component Value Date    GLUCOSE 130 (H) 03/19/2025    CALCIUM 10.2 03/19/2025     03/19/2025    K 5.1 03/19/2025    CO2 26.7 03/19/2025     03/19/2025    BUN 16 03/19/2025    CREATININE 0.86 03/19/2025    EGFR 78.9 03/19/2025    BCR 18.6 03/19/2025    ANIONGAP 8.3 03/19/2025       Lab Results   Component Value Date    CHOL 180 03/19/2025    CHLPL 185 09/20/2024    TRIG 114 03/19/2025    HDL 58 03/19/2025     (H) 03/19/2025       Procedures       Results for orders placed during the hospital encounter of 02/19/25    Adult Transesophageal Echo ([facility]) W/ Cont if Necessary Per Protocol    Interpretation Summary    Left ventricular systolic function is normal. Left ventricular ejection fraction appears to be 61 - 65%.    The right ventricular cavity is moderate to severely dilated.    The right atrial cavity is severely  dilated.    Large secundum ASD      DISCUSSION/SUMMARY  Very pleasant 59-year-old female with a medical history of dyspnea on exertion, decreased exercise capacity, right atrial and right ventricular dilation with moderate pulmonary hypertension.  Her PA pressure and tracings have been reviewed and was 50/15/30.  She has significant flow with an elevation in her Qp/Qs as documented above.  Her PVR is less than 3 Wood units.  PA pressure is less than two thirds or systemic.  I do think she has a clear indication for closure and she should tolerate this well.    Secundum ASD: Appears to be large but does seem to have appropriate rims to consider  percutaneous closure.  - She does have moderate pulmonary hypertension, however with her pulmonary pressures and PVR assessment I do not think this would preclude us from considering closure of this.  -I will set her up for secundum ASD closure with balloon sizing.    2.  Right ventricular dilation and dysfunction: Documented above.    3.  Essential hypertension: Blood pressure is currently well-controlled.  No changes indicated

## 2025-03-24 NOTE — PROGRESS NOTES
Felipa Stern  1965  Date of Office Visit: 03/24/25  Encounter Provider: Tolu Vences MD  Place of Service: [facility] CARDIOLOGY      CHIEF COMPLAINT:  Secundum ASD    HISTORY OF PRESENT ILLNESS:  59-year-old patient of Dr. Sierra Schmitt presents to me for evaluation of atrial septal defect.  She was evaluated by Nicolle Aldana previously.  She has a medical history of essential hypertension and hyperlipidemia.  She underwent a prior treadmill stress test with moderately impaired exercise capacity and had a transthoracic echocardiogram which was done following that.  The transthoracic echocardiogram was done on 11/13/2024 and she was noted to have a moderately dilated right atrial cavity along with moderate dilation of her right ventricle.  She had evidence of hypertrophy and evidence of pressure and volume overload.  Her RVSP on evaluation was elevated at 53 mmHg.  She had a right heart catheterization following that showing moderate pulmonary hypertension with a PA pressure of 50/15/30.  Right atrial pressure was normal.  She had a shunt run which was performed with a significant step up and QP-QS that was elevated above 5.  She had a transesophageal echocardiogram which I reviewed.  This once again showed right atrial and right ventricular dilation and a secundum ASD.  Adequate superior, posterior rim.  Inferior rim looks acceptable.  Certainly not small.  Looks around 2 cm in diameter.  PVR is less than 3.  PA systolic is certainly less 2/3 patients systemic pressure at that time.    Review of Systems   Constitutional: Negative for fever and malaise/fatigue.   HENT:  Negative for nosebleeds and sore throat.    Eyes:  Negative for blurred vision and double vision.   Cardiovascular:  Negative for chest pain, claudication, palpitations and syncope.   Respiratory:  Negative for cough, shortness of breath and snoring.    Endocrine: Negative for cold intolerance, heat  intolerance and polydipsia.   Skin:  Negative for itching, poor wound healing and rash.   Musculoskeletal:  Negative for joint pain, joint swelling, muscle weakness and myalgias.   Gastrointestinal:  Negative for abdominal pain, melena, nausea and vomiting.   Neurological:  Negative for light-headedness, loss of balance, seizures, vertigo and weakness.   Psychiatric/Behavioral:  Negative for altered mental status and depression.        Past Medical History:   Diagnosis Date    Allergic 1990    Mostly mild, intermittent.    Allergic rhinitis     Back pain     Breast cyst     Class 1 obesity due to excess calories with serious comorbidity and body mass index (BMI) of 31.0 to 31.9 in adult 06/21/2023    Elevated blood sugar     Essential hypertension 02/23/2018    H/O mammogram     Hyperlipidemia     Pulmonary hypertension     Systolic murmur 09/20/2024    Tinnitus     Tremor     Visual impairment 1998?    Ocular pressure runs higer than normal.       The following portions of the patient's history were reviewed and updated as appropriate: Social history , Family history, and Surgical history     Current Outpatient Medications on File Prior to Visit   Medication Sig Dispense Refill    albuterol sulfate  (90 Base) MCG/ACT inhaler       amLODIPine (NORVASC) 5 MG tablet Take 1 tablet by mouth Daily. 90 tablet 3    aspirin 81 MG chewable tablet Chew 1 tablet Daily.      atorvastatin (LIPITOR) 20 MG tablet TAKE 1 TABLET BY MOUTH DAILY 90 tablet 1    clobetasol (TEMOVATE) 0.05 % external solution  (Patient taking differently: Apply  topically to the appropriate area as directed As Needed.)      coenzyme Q10 100 MG capsule Take 1 capsule by mouth Daily.      fluocinonide (LIDEX) 0.05 % gel       Glucosamine-Chondroitin (GLUCOSAMINE CHONDR COMPLEX PO) Take  by mouth Daily. 5179-0045 mg      ketoconazole (NIZORAL) 2 % shampoo       losartan (COZAAR) 100 MG tablet TAKE 1 TABLET BY MOUTH DAILY 90 tablet 1    Multiple  "Vitamins-Minerals (MULTIVITAMIN GUMMIES ADULT PO) Take 1 tablet by mouth Daily.      vitamin B-12 (CYANOCOBALAMIN) 100 MCG tablet Take 1 tablet by mouth Daily.      vitamin D3 125 MCG (5000 UT) capsule capsule Take 1 capsule by mouth Daily.       No current facility-administered medications on file prior to visit.       No Known Allergies    Vitals:    03/24/25 1042   Height: 162.6 cm (64\")     Body mass index is 32.1 kg/m².   Constitutional:       Appearance: Well-developed.   Eyes:      General: No scleral icterus.     Conjunctiva/sclera: Conjunctivae normal.   HENT:      Head: Normocephalic and atraumatic.   Neck:      Thyroid: No thyromegaly.      Vascular: Normal carotid pulses. No carotid bruit, hepatojugular reflux or JVD.      Trachea: No tracheal deviation.   Pulmonary:      Effort: No respiratory distress.      Breath sounds: Normal breath sounds. No decreased breath sounds. No wheezing. No rhonchi. No rales.   Chest:      Chest wall: Not tender to palpatation.   Cardiovascular:      Normal rate. Regular rhythm.      No gallop.    Pulses:     Carotid: 2+ bilaterally.     Radial: 2+ bilaterally.     Femoral: 2+ bilaterally.     Dorsalis pedis: 2+ bilaterally.     Posterior tibial: 2+ bilaterally.  Edema:     Peripheral edema absent.   Abdominal:      General: Bowel sounds are normal. There is no distension.      Palpations: Abdomen is soft.      Tenderness: There is no abdominal tenderness.   Musculoskeletal:         General: No deformity.      Cervical back: Normal range of motion and neck supple. Skin:     Findings: No erythema or rash.   Neurological:      Mental Status: Alert and oriented to person, place, and time.      Sensory: No sensory deficit.   Psychiatric:         Behavior: Behavior normal.         Lab Results   Component Value Date    WBC 8.34 03/19/2025    HGB 14.5 03/19/2025    HCT 43.5 03/19/2025    MCV 95.0 03/19/2025     03/19/2025       Lab Results   Component Value Date    GLUCOSE " 130 (H) 03/19/2025    BUN 16 03/19/2025    CREATININE 0.86 03/19/2025     03/19/2025    K 5.1 03/19/2025     03/19/2025    CALCIUM 10.2 03/19/2025    PROTEINTOT 7.8 03/19/2025    ALBUMIN 4.2 03/19/2025    ALT 20 03/19/2025    AST 22 03/19/2025    ALKPHOS 117 03/19/2025    BILITOT 0.5 03/19/2025    GLOB 3.6 03/19/2025    AGRATIO 1.2 03/19/2025    BCR 18.6 03/19/2025    ANIONGAP 8.3 03/19/2025    EGFR 78.9 03/19/2025       Lab Results   Component Value Date    GLUCOSE 130 (H) 03/19/2025    CALCIUM 10.2 03/19/2025     03/19/2025    K 5.1 03/19/2025    CO2 26.7 03/19/2025     03/19/2025    BUN 16 03/19/2025    CREATININE 0.86 03/19/2025    EGFR 78.9 03/19/2025    BCR 18.6 03/19/2025    ANIONGAP 8.3 03/19/2025       Lab Results   Component Value Date    CHOL 180 03/19/2025    CHLPL 185 09/20/2024    TRIG 114 03/19/2025    HDL 58 03/19/2025     (H) 03/19/2025       Procedures       Results for orders placed during the hospital encounter of 02/19/25    Adult Transesophageal Echo ([place]) W/ Cont if Necessary Per Protocol    Interpretation Summary    Left ventricular systolic function is normal. Left ventricular ejection fraction appears to be 61 - 65%.    The right ventricular cavity is moderate to severely dilated.    The right atrial cavity is severely  dilated.    Large secundum ASD      DISCUSSION/SUMMARY  Very pleasant 59-year-old female with a medical history of dyspnea on exertion, decreased exercise capacity, right atrial and right ventricular dilation with moderate pulmonary hypertension.  Her PA pressure and tracings have been reviewed and was 50/15/30.  She has significant flow with an elevation in her Qp/Qs as documented above.  Her PVR is less than 3 Wood units.  PA pressure is less than two thirds or systemic.  I do think she has a clear indication for closure and she should tolerate this well.    Secundum ASD: Appears to be large but does seem to have appropriate rims to consider  percutaneous closure.  - She does have moderate pulmonary hypertension, however with her pulmonary pressures and PVR assessment I do not think this would preclude us from considering closure of this.  -I will set her up for secundum ASD closure with balloon sizing.    2.  Right ventricular dilation and dysfunction: Documented above.    3.  Essential hypertension: Blood pressure is currently well-controlled.  No changes indicated

## 2025-03-31 ENCOUNTER — TELEPHONE (OUTPATIENT)
Dept: CARDIOLOGY | Age: 60
End: 2025-03-31
Payer: COMMERCIAL

## 2025-03-31 NOTE — TELEPHONE ENCOUNTER
Caller: Felipa Stern    Relationship: Self    Best call back number: [phone]      What was the call regarding: PATIENT IS HAVING HOLE IN HEART CLOSED ON 04.08.25 WANTS TO KNOW IF SHE NEEDS TO BE SEEN PRIOR TO THAT OR AFTER PLEASE CALL AND ADVISE.

## 2025-04-11 ENCOUNTER — HOSPITAL ENCOUNTER (OUTPATIENT)
Facility: HOSPITAL | Age: 60
Setting detail: HOSPITAL OUTPATIENT SURGERY
Discharge: HOME | End: 2025-04-11
Attending: INTERNAL MEDICINE | Admitting: INTERNAL MEDICINE
Payer: COMMERCIAL

## 2025-04-11 VITALS
BODY MASS INDEX: 30.73 KG/M2 | HEART RATE: 68 BPM | WEIGHT: 180 LBS | DIASTOLIC BLOOD PRESSURE: 37 MMHG | OXYGEN SATURATION: 92 % | HEIGHT: 64 IN | RESPIRATION RATE: 16 BRPM | TEMPERATURE: 98.1 F | SYSTOLIC BLOOD PRESSURE: 93 MMHG

## 2025-04-11 DIAGNOSIS — Q21.10 ASD (ATRIAL SEPTAL DEFECT): Primary | ICD-10-CM

## 2025-04-11 DIAGNOSIS — R06.09 DOE (DYSPNEA ON EXERTION): ICD-10-CM

## 2025-04-11 DIAGNOSIS — Q21.10 ATRIAL SEPTAL DEFECT: ICD-10-CM

## 2025-04-11 DIAGNOSIS — I51.7 RIGHT VENTRICULAR DILATION: ICD-10-CM

## 2025-04-11 PROCEDURE — C1817 SEPTAL DEFECT IMP SYS: HCPCS | Performed by: INTERNAL MEDICINE

## 2025-04-11 PROCEDURE — C1760 CLOSURE DEV, VASC: HCPCS | Performed by: INTERNAL MEDICINE

## 2025-04-11 PROCEDURE — 85347 COAGULATION TIME ACTIVATED: CPT

## 2025-04-11 PROCEDURE — C1894 INTRO/SHEATH, NON-LASER: HCPCS | Performed by: INTERNAL MEDICINE

## 2025-04-11 PROCEDURE — C1887 CATHETER, GUIDING: HCPCS | Performed by: INTERNAL MEDICINE

## 2025-04-11 PROCEDURE — 25010000002 LIDOCAINE 1% - EPINEPHRINE 1:100000 1 %-1:100000 SOLUTION: Performed by: INTERNAL MEDICINE

## 2025-04-11 PROCEDURE — 25510000001 IOPAMIDOL PER 1 ML: Performed by: INTERNAL MEDICINE

## 2025-04-11 PROCEDURE — 25010000002 FENTANYL CITRATE (PF) 50 MCG/ML SOLUTION: Performed by: INTERNAL MEDICINE

## 2025-04-11 PROCEDURE — C1769 GUIDE WIRE: HCPCS | Performed by: INTERNAL MEDICINE

## 2025-04-11 PROCEDURE — 25010000002 PROTAMINE SULFATE PER 10 MG: Performed by: INTERNAL MEDICINE

## 2025-04-11 PROCEDURE — 25010000002 HEPARIN (PORCINE) PER 1000 UNITS: Performed by: INTERNAL MEDICINE

## 2025-04-11 PROCEDURE — 25810000003 SODIUM CHLORIDE 0.9 % SOLUTION: Performed by: INTERNAL MEDICINE

## 2025-04-11 PROCEDURE — 93580 TRANSCATH CLOSURE OF ASD: CPT | Performed by: INTERNAL MEDICINE

## 2025-04-11 PROCEDURE — C1759 CATH, INTRA ECHOCARDIOGRAPHY: HCPCS | Performed by: INTERNAL MEDICINE

## 2025-04-11 PROCEDURE — 25010000002 CEFAZOLIN PER 500 MG: Performed by: INTERNAL MEDICINE

## 2025-04-11 PROCEDURE — 25010000002 LIDOCAINE 2% SOLUTION: Performed by: INTERNAL MEDICINE

## 2025-04-11 PROCEDURE — 25010000002 MIDAZOLAM PER 1 MG: Performed by: INTERNAL MEDICINE

## 2025-04-11 PROCEDURE — 93662 INTRACARDIAC ECG (ICE): CPT | Performed by: INTERNAL MEDICINE

## 2025-04-11 DEVICE — IMPLANTABLE DEVICE: Type: IMPLANTABLE DEVICE | Site: HEART | Status: FUNCTIONAL

## 2025-04-11 RX ORDER — SODIUM CHLORIDE 9 MG/ML
100 INJECTION, SOLUTION INTRAVENOUS CONTINUOUS
Status: DISCONTINUED | OUTPATIENT
Start: 2025-04-11 | End: 2025-04-11 | Stop reason: HOSPADM

## 2025-04-11 RX ORDER — LIDOCAINE HYDROCHLORIDE AND EPINEPHRINE 10; 10 MG/ML; UG/ML
INJECTION, SOLUTION INFILTRATION; PERINEURAL
Status: DISCONTINUED | OUTPATIENT
Start: 2025-04-11 | End: 2025-04-11 | Stop reason: HOSPADM

## 2025-04-11 RX ORDER — CLOPIDOGREL BISULFATE 75 MG/1
TABLET ORAL
Status: DISCONTINUED | OUTPATIENT
Start: 2025-04-11 | End: 2025-04-11 | Stop reason: HOSPADM

## 2025-04-11 RX ORDER — FENTANYL CITRATE 50 UG/ML
INJECTION, SOLUTION INTRAMUSCULAR; INTRAVENOUS
Status: DISCONTINUED | OUTPATIENT
Start: 2025-04-11 | End: 2025-04-11 | Stop reason: HOSPADM

## 2025-04-11 RX ORDER — IOPAMIDOL 755 MG/ML
INJECTION, SOLUTION INTRAVASCULAR
Status: DISCONTINUED | OUTPATIENT
Start: 2025-04-11 | End: 2025-04-11 | Stop reason: HOSPADM

## 2025-04-11 RX ORDER — CLOPIDOGREL BISULFATE 75 MG/1
75 TABLET ORAL DAILY
Qty: 90 TABLET | Refills: 0 | Status: SHIPPED | OUTPATIENT
Start: 2025-04-11

## 2025-04-11 RX ORDER — HEPARIN SODIUM 1000 [USP'U]/ML
INJECTION, SOLUTION INTRAVENOUS; SUBCUTANEOUS
Status: DISCONTINUED | OUTPATIENT
Start: 2025-04-11 | End: 2025-04-11 | Stop reason: HOSPADM

## 2025-04-11 RX ORDER — CEFAZOLIN SODIUM 1 G/3ML
INJECTION, POWDER, FOR SOLUTION INTRAMUSCULAR; INTRAVENOUS
Status: DISCONTINUED | OUTPATIENT
Start: 2025-04-11 | End: 2025-04-11 | Stop reason: HOSPADM

## 2025-04-11 RX ORDER — HYDROCODONE BITARTRATE AND ACETAMINOPHEN 5; 325 MG/1; MG/1
1 TABLET ORAL EVERY 4 HOURS PRN
Refills: 0 | Status: DISCONTINUED | OUTPATIENT
Start: 2025-04-11 | End: 2025-04-11 | Stop reason: HOSPADM

## 2025-04-11 RX ORDER — PROTAMINE SULFATE 10 MG/ML
INJECTION, SOLUTION INTRAVENOUS
Status: DISCONTINUED | OUTPATIENT
Start: 2025-04-11 | End: 2025-04-11 | Stop reason: HOSPADM

## 2025-04-11 RX ORDER — MIDAZOLAM HYDROCHLORIDE 1 MG/ML
INJECTION, SOLUTION INTRAMUSCULAR; INTRAVENOUS
Status: DISCONTINUED | OUTPATIENT
Start: 2025-04-11 | End: 2025-04-11 | Stop reason: HOSPADM

## 2025-04-11 RX ORDER — ACETAMINOPHEN 325 MG/1
650 TABLET ORAL EVERY 4 HOURS PRN
Status: DISCONTINUED | OUTPATIENT
Start: 2025-04-11 | End: 2025-04-11 | Stop reason: HOSPADM

## 2025-04-11 RX ORDER — ONDANSETRON 2 MG/ML
4 INJECTION INTRAMUSCULAR; INTRAVENOUS EVERY 6 HOURS PRN
Status: DISCONTINUED | OUTPATIENT
Start: 2025-04-11 | End: 2025-04-11 | Stop reason: HOSPADM

## 2025-04-11 RX ORDER — ASPIRIN 325 MG
TABLET ORAL
Status: DISCONTINUED | OUTPATIENT
Start: 2025-04-11 | End: 2025-04-11 | Stop reason: HOSPADM

## 2025-04-11 RX ORDER — LIDOCAINE HYDROCHLORIDE 20 MG/ML
INJECTION, SOLUTION INFILTRATION; PERINEURAL
Status: DISCONTINUED | OUTPATIENT
Start: 2025-04-11 | End: 2025-04-11 | Stop reason: HOSPADM

## 2025-04-11 RX ORDER — SODIUM CHLORIDE 0.9 % (FLUSH) 0.9 %
10 SYRINGE (ML) INJECTION AS NEEDED
Status: DISCONTINUED | OUTPATIENT
Start: 2025-04-11 | End: 2025-04-11 | Stop reason: HOSPADM

## 2025-04-11 RX ORDER — NITROGLYCERIN 0.4 MG/1
0.4 TABLET SUBLINGUAL
Status: DISCONTINUED | OUTPATIENT
Start: 2025-04-11 | End: 2025-04-11 | Stop reason: HOSPADM

## 2025-04-11 RX ORDER — ONDANSETRON 4 MG/1
4 TABLET, ORALLY DISINTEGRATING ORAL EVERY 6 HOURS PRN
Status: DISCONTINUED | OUTPATIENT
Start: 2025-04-11 | End: 2025-04-11 | Stop reason: HOSPADM

## 2025-04-11 RX ORDER — SODIUM CHLORIDE 9 MG/ML
75 INJECTION, SOLUTION INTRAVENOUS CONTINUOUS
Status: DISCONTINUED | OUTPATIENT
Start: 2025-04-12 | End: 2025-04-11 | Stop reason: HOSPADM

## 2025-04-11 RX ADMIN — SODIUM CHLORIDE 75 ML/HR: 9 INJECTION, SOLUTION INTRAVENOUS at 07:59

## 2025-04-11 NOTE — DISCHARGE INSTRUCTIONS
[facility]  [address] [place] [address]      SEPTAL DEFECT CLOSURE DISCHARGE INSTRUCTIONS    Refer to this sheet in the next few weeks. These instructions provide you with information on caring for yourself after your procedure. Your health care provider may also give you more specific instructions. Your treatment has been planned according to current medical practices, but problems sometimes occur. Call your health care provider if you have any problems or questions after your procedure.      What to Expect After the Procedure:  After your procedure, it is typical to have the following sensations:  Minor discomfort or tenderness and a small bump at the catheter insertion site. The bump should usually decrease in size and tenderness within 1 to 2 weeks.  Any bruising will usually fade within 2 to 4 weeks.    Home Care Instructions:  Do not apply powder or lotion to the site.  Do not take baths, swim, or use a hot tub until your health care provider approves and the site is completely healed.  Do not bend, squat, or lift anything over 20 lb (9 kg) or as directed by your health care provider. However, we recommend lifting nothing heavier than a gallon of milk.    You may shower 24 hours after the procedure. Remove the bandage (dressing) and gently wash the site with plain soap and water. Gently pat the site dry. You may apply a band aid daily for 2 days if desired.    Inspect the site at least twice daily.  Increase your fluid intake for the next 2 days.    Limit your activity for the first 48 hours. .    Avoid strenuous activity for 1 week or as advised by your physician.    Follow instructions about when you can drive or return to work as directed by your physician.    Hold direct pressure over the site when you cough, sneeze, laugh or change positions.  Do this for the next 2 days.    Do not operate machinery or power tools for 24 hours.  A responsible adult should be with you for the first  24 hours after you arrive home. Do not make any important legal decisions or sign legal papers for 24 hours.  Do not drink alcohol for 24 hours.       Call Your Doctor If:  You have drainage (other than a small amount of blood on the dressing).  You have chills or a fever > 101.  You have redness, warmth, swelling(larger than a walnut), or pain at the insertion site  You develop chest pain or shortness of breath, feel faint, or pass out.  You develop pain, discoloration, coldness, numbness, tingling, or severe bruising in the leg that held the catheter.  You develop bleeding from any other place, such as the bowels.  You have heavy bleeding from the site, hold pressure on the site for 20 minutes.  If the bleeding stops, apply a fresh bandage and call your cardiologist.  However, if you continue to have bleeding, call 911.  You have any symptoms of a stroke.  Remember BE FAST  B-balance. Sudden trouble walking or loss of balance.  E-eyes.  Sudden changes in how you see or a sudden onset of a very bad headache.   F-face. Sudden weakness or loss of feeling of the face or facial droop on one side.   A-arms Sudden weakness or numbness in one arm.  One arm drifts down if they are both held out in front of you. This happens suddenly and usually on one side of the body.  S-speech.  Sudden trouble speaking, slurred speech or trouble understanding what people are saying.   T-time  Time to call emergency services.  Write down the symptoms and the time they started.        Make Sure You:  Understand these instructions.  Will watch your condition.  Will get help right away if you are not doing well or get worse.

## 2025-04-11 NOTE — Clinical Note
A 9 fr sheath was successfully inserted using micropuncture technique with ultrasound guidance into the left femoral vein. Sheath insertion not delayed.

## 2025-04-14 LAB — ACT BLD: 314 SECONDS (ref 82–152)

## 2025-04-16 ENCOUNTER — TELEPHONE (OUTPATIENT)
Dept: CARDIOLOGY | Age: 60
End: 2025-04-16
Payer: COMMERCIAL

## 2025-04-16 NOTE — TELEPHONE ENCOUNTER
Pt is s/p ASD closure from 4/11.  She called in today because she noticed 4/14 late in the evening some bruising in her R groin.  She describes it as purple and black.  She thinks she has a new lump in the R groin that is maybe 1 in by 1.5 inches since Monday evening as well, perhaps the size of a strawberry that is hard.  She has no pain, no bleeding, and no tingling, numbness, color change, or change in temperature of the RLE.  Groin site is DINA.    Do you have any recommendations for this patient?    Regina HICKS RN  [facility]  04/16/25  15:12 EDT

## 2025-04-17 NOTE — TELEPHONE ENCOUNTER
I tried to call Felipa Stern but there was no answer.  Left a voicemail asking patient to call back.  Will continue to try to reach pt.    HUB- if pt calls back, please transfer through to triage.    Regina HICKS RN  [facility]riage [facility] [place]  04/17/25 10:41 EDT

## 2025-04-17 NOTE — TELEPHONE ENCOUNTER
Patient returned call.  I let her know of recommendations from CS and she verbalizes understanding.    Caren Martin RN  [facility] Triage  04/17/25 12:48 EDT

## 2025-05-08 DIAGNOSIS — I10 ESSENTIAL HYPERTENSION: ICD-10-CM

## 2025-05-08 RX ORDER — LOSARTAN POTASSIUM 100 MG/1
100 TABLET ORAL DAILY
Qty: 90 TABLET | Refills: 1 | Status: SHIPPED | OUTPATIENT
Start: 2025-05-08

## 2025-05-21 ENCOUNTER — HOSPITAL ENCOUNTER (OUTPATIENT)
Dept: CARDIOLOGY | Facility: HOSPITAL | Age: 60
Discharge: HOME | End: 2025-05-21
Admitting: INTERNAL MEDICINE
Payer: COMMERCIAL

## 2025-05-21 VITALS
HEART RATE: 74 BPM | HEIGHT: 64 IN | WEIGHT: 180 LBS | DIASTOLIC BLOOD PRESSURE: 60 MMHG | SYSTOLIC BLOOD PRESSURE: 100 MMHG | BODY MASS INDEX: 30.73 KG/M2

## 2025-05-21 DIAGNOSIS — Q21.10 ASD (ATRIAL SEPTAL DEFECT): ICD-10-CM

## 2025-05-21 LAB — BH CV ECHO SHUNT ASSESSMENT PERFORMED (HIDDEN SCRIPTING): 1

## 2025-05-21 PROCEDURE — 93325 DOPPLER ECHO COLOR FLOW MAPG: CPT

## 2025-05-21 PROCEDURE — 93321 DOPPLER ECHO F-UP/LMTD STD: CPT

## 2025-05-21 PROCEDURE — 93308 TTE F-UP OR LMTD: CPT

## 2025-06-18 ENCOUNTER — TRANSCRIBE ORDERS (OUTPATIENT)
Dept: ADMINISTRATIVE | Facility: HOSPITAL | Age: 60
End: 2025-06-18
Payer: COMMERCIAL

## 2025-06-18 DIAGNOSIS — I27.20 PULMONARY HTN: Primary | ICD-10-CM

## 2025-07-01 ENCOUNTER — TELEPHONE (OUTPATIENT)
Dept: CARDIOLOGY | Age: 60
End: 2025-07-01
Payer: COMMERCIAL

## 2025-07-02 ENCOUNTER — HOSPITAL ENCOUNTER (OUTPATIENT)
Dept: CARDIOLOGY | Facility: HOSPITAL | Age: 60
Discharge: HOME | End: 2025-07-02
Admitting: HOSPITALIST
Payer: COMMERCIAL

## 2025-07-02 VITALS
DIASTOLIC BLOOD PRESSURE: 80 MMHG | WEIGHT: 180 LBS | HEIGHT: 64 IN | HEART RATE: 60 BPM | BODY MASS INDEX: 30.73 KG/M2 | SYSTOLIC BLOOD PRESSURE: 130 MMHG

## 2025-07-02 DIAGNOSIS — I27.20 PULMONARY HTN: ICD-10-CM

## 2025-07-02 LAB
AORTIC ARCH: 2.4 CM
AORTIC DIMENSIONLESS INDEX: 0.76 (DI)
ASCENDING AORTA: 2.4 CM
AV MEAN PRESS GRAD SYS DOP V1V2: 4 MMHG
AV VMAX SYS DOP: 136 CM/SEC
BH CV ECHO MEAS - ACS: 2 CM
BH CV ECHO MEAS - AO MAX PG: 7.4 MMHG
BH CV ECHO MEAS - AO ROOT DIAM: 3.2 CM
BH CV ECHO MEAS - AO V2 VTI: 32.6 CM
BH CV ECHO MEAS - AVA(I,D): 2.05 CM2
BH CV ECHO MEAS - EDV(CUBED): 51.1 ML
BH CV ECHO MEAS - EDV(MOD-SP2): 51 ML
BH CV ECHO MEAS - EDV(MOD-SP4): 48 ML
BH CV ECHO MEAS - EF(MOD-SP2): 62.7 %
BH CV ECHO MEAS - EF(MOD-SP4): 64.6 %
BH CV ECHO MEAS - ESV(CUBED): 14.8 ML
BH CV ECHO MEAS - ESV(MOD-SP2): 19 ML
BH CV ECHO MEAS - ESV(MOD-SP4): 17 ML
BH CV ECHO MEAS - FS: 33.9 %
BH CV ECHO MEAS - IVS/LVPW: 1.01 CM
BH CV ECHO MEAS - IVSD: 1.07 CM
BH CV ECHO MEAS - LAT PEAK E' VEL: 8.7 CM/SEC
BH CV ECHO MEAS - LV DIASTOLIC VOL/BSA (35-75): 25.7 CM2
BH CV ECHO MEAS - LV MASS(C)D: 124.2 GRAMS
BH CV ECHO MEAS - LV MAX PG: 4 MMHG
BH CV ECHO MEAS - LV MEAN PG: 2 MMHG
BH CV ECHO MEAS - LV SYSTOLIC VOL/BSA (12-30): 9.1 CM2
BH CV ECHO MEAS - LV V1 MAX: 99.4 CM/SEC
BH CV ECHO MEAS - LV V1 VTI: 24.7 CM
BH CV ECHO MEAS - LVIDD: 3.7 CM
BH CV ECHO MEAS - LVIDS: 2.45 CM
BH CV ECHO MEAS - LVOT AREA: 2.7 CM2
BH CV ECHO MEAS - LVOT DIAM: 1.85 CM
BH CV ECHO MEAS - LVPWD: 1.06 CM
BH CV ECHO MEAS - MED PEAK E' VEL: 8.3 CM/SEC
BH CV ECHO MEAS - MV A DUR: 0.11 SEC
BH CV ECHO MEAS - MV A MAX VEL: 76.7 CM/SEC
BH CV ECHO MEAS - MV DEC SLOPE: 445.1 CM/SEC2
BH CV ECHO MEAS - MV DEC TIME: 0.17 SEC
BH CV ECHO MEAS - MV E MAX VEL: 96.4 CM/SEC
BH CV ECHO MEAS - MV E/A: 1.26
BH CV ECHO MEAS - MV MAX PG: 3.8 MMHG
BH CV ECHO MEAS - MV MEAN PG: 1.37 MMHG
BH CV ECHO MEAS - MV P1/2T: 66.5 MSEC
BH CV ECHO MEAS - MV V2 VTI: 29.7 CM
BH CV ECHO MEAS - MVA(P1/2T): 3.3 CM2
BH CV ECHO MEAS - MVA(VTI): 2.25 CM2
BH CV ECHO MEAS - PA ACC TIME: 0.13 SEC
BH CV ECHO MEAS - PA V2 MAX: 79.3 CM/SEC
BH CV ECHO MEAS - PI END-D VEL: 155.3 CM/SEC
BH CV ECHO MEAS - PULM A REVS DUR: 0.09 SEC
BH CV ECHO MEAS - PULM A REVS VEL: 30.8 CM/SEC
BH CV ECHO MEAS - PULM DIAS VEL: 45 CM/SEC
BH CV ECHO MEAS - PULM S/D: 1.12
BH CV ECHO MEAS - PULM SYS VEL: 50.6 CM/SEC
BH CV ECHO MEAS - QP/QS: 0.8
BH CV ECHO MEAS - RAP SYSTOLE: 3 MMHG
BH CV ECHO MEAS - RV MAX PG: 1.04 MMHG
BH CV ECHO MEAS - RV V1 MAX: 51 CM/SEC
BH CV ECHO MEAS - RV V1 VTI: 12.5 CM
BH CV ECHO MEAS - RVOT DIAM: 2.33 CM
BH CV ECHO MEAS - RVSP: 32 MMHG
BH CV ECHO MEAS - SUP REN AO DIAM: 1.9 CM
BH CV ECHO MEAS - SV(LVOT): 66.7 ML
BH CV ECHO MEAS - SV(MOD-SP2): 32 ML
BH CV ECHO MEAS - SV(MOD-SP4): 31 ML
BH CV ECHO MEAS - SV(RVOT): 53.5 ML
BH CV ECHO MEAS - SVI(LVOT): 35.7 ML/M2
BH CV ECHO MEAS - SVI(MOD-SP2): 17.1 ML/M2
BH CV ECHO MEAS - SVI(MOD-SP4): 16.6 ML/M2
BH CV ECHO MEAS - TAPSE (>1.6): 2.07 CM
BH CV ECHO MEAS - TR MAX PG: 28.5 MMHG
BH CV ECHO MEAS - TR MAX VEL: 267.1 CM/SEC
BH CV ECHO MEAS RV FREE WALL STRAIN: -14.4 %
BH CV ECHO MEASUREMENTS AVERAGE E/E' RATIO: 11.34
BH CV XLRA - RV BASE: 3.7 CM
BH CV XLRA - RV LENGTH: 6.9 CM
BH CV XLRA - RV MID: 3.1 CM
BH CV XLRA - TDI S': 11.7 CM/SEC
LEFT ATRIUM VOLUME INDEX: 19.1 ML/M2
SINUS: 3.1 CM
STJ: 2.28 CM

## 2025-07-02 PROCEDURE — 93306 TTE W/DOPPLER COMPLETE: CPT

## 2025-07-02 PROCEDURE — 93306 TTE W/DOPPLER COMPLETE: CPT | Performed by: INTERNAL MEDICINE

## 2025-07-07 RX ORDER — CLOPIDOGREL BISULFATE 75 MG/1
75 TABLET ORAL DAILY
Qty: 90 TABLET | Refills: 0 | Status: SHIPPED | OUTPATIENT
Start: 2025-07-07

## (undated) DEVICE — GLIDESHEATH BASIC HYDROPHILIC COATED INTRODUCER SHEATH: Brand: GLIDESHEATH

## (undated) DEVICE — GW HITORQUE/BAL MID/WT J W/HCOAT .014 3X190CM

## (undated) DEVICE — CATH DIAG IMPULSE MPA 6F 100CM

## (undated) DEVICE — GW AMPLATZER SS .035 1.5MM J 260CM

## (undated) DEVICE — KT MANIFLD CARDIAC

## (undated) DEVICE — CANN NASL CO2 TRULINK W/O2 A/

## (undated) DEVICE — FEMORAL ENTRY ANGIOGRAPHY SHIELD-YELLOW: Brand: RADPAD

## (undated) DEVICE — CATH ULTRASND ECHO ACUNAV FOR ACUSON 8F 90CM

## (undated) DEVICE — THE TORRENT IRRIGATION SCOPE CONNECTOR IS USED WITH THE TORRENT IRRIGATION TUBING TO PROVIDE IRRIGATION FLUIDS SUCH AS STERILE WATER DURING GASTROINTESTINAL ENDOSCOPIC PROCEDURES WHEN USED IN CONJUNCTION WITH AN IRRIGATION PUMP (OR ELECTROSURGICAL UNIT).: Brand: TORRENT

## (undated) DEVICE — Device

## (undated) DEVICE — RADIFOCUS GLIDEWIRE: Brand: GLIDEWIRE

## (undated) DEVICE — Device: Brand: DEFENDO AIR/WATER/SUCTION AND BIOPSY VALVE

## (undated) DEVICE — HI-TORQUE SUPRA CORE .035 PERIPHERAL GUIDE WIRE .035 X 190 CM: Brand: HI-TORQUE SUPRA CORE

## (undated) DEVICE — DGW .035 FC J3MM 260CM TEF: Brand: EMERALD

## (undated) DEVICE — PINNACLE INTRODUCER SHEATH: Brand: PINNACLE

## (undated) DEVICE — STERILE (15.2 TAPERED TO 7.6 X 183CM) POLYETHYLENE ACCORDION-FOLDED COVER FOR USE WITH SIEMENS ACUNAV ULTRASOUND CATHETER FAMILY CONNECTOR: Brand: SWIFTLINK TRANSDUCER COVER

## (undated) DEVICE — LOU PACE DEFIB: Brand: MEDLINE INDUSTRIES, INC.

## (undated) DEVICE — BALN PRESS WEDGE 5F 110CM

## (undated) DEVICE — TUBING, SUCTION, 1/4" X 10', STRAIGHT: Brand: MEDLINE

## (undated) DEVICE — INTRO SHEATH ART/FEM ENGAGE .035IN 9F 25CM

## (undated) DEVICE — PK CATH CARD 40

## (undated) DEVICE — PERCLOSE PROGLIDE™ SUTURE-MEDIATED CLOSURE SYSTEM: Brand: PERCLOSE PROGLIDE™